# Patient Record
Sex: FEMALE | Race: BLACK OR AFRICAN AMERICAN | NOT HISPANIC OR LATINO | Employment: UNEMPLOYED | URBAN - METROPOLITAN AREA
[De-identification: names, ages, dates, MRNs, and addresses within clinical notes are randomized per-mention and may not be internally consistent; named-entity substitution may affect disease eponyms.]

---

## 2018-12-20 ENCOUNTER — OFFICE VISIT (OUTPATIENT)
Dept: PLASTIC SURGERY | Facility: CLINIC | Age: 43
End: 2018-12-20
Payer: COMMERCIAL

## 2018-12-20 VITALS
HEART RATE: 107 BPM | WEIGHT: 208.56 LBS | DIASTOLIC BLOOD PRESSURE: 104 MMHG | BODY MASS INDEX: 32.66 KG/M2 | SYSTOLIC BLOOD PRESSURE: 153 MMHG | TEMPERATURE: 99 F

## 2018-12-20 DIAGNOSIS — M54.9 CHRONIC NECK AND BACK PAIN: ICD-10-CM

## 2018-12-20 DIAGNOSIS — I10 HYPERTENSION, UNSPECIFIED TYPE: ICD-10-CM

## 2018-12-20 DIAGNOSIS — G89.29 CHRONIC NECK AND BACK PAIN: ICD-10-CM

## 2018-12-20 DIAGNOSIS — N62 MACROMASTIA: Primary | ICD-10-CM

## 2018-12-20 DIAGNOSIS — M54.2 CHRONIC NECK AND BACK PAIN: ICD-10-CM

## 2018-12-20 PROCEDURE — 99999 PR PBB SHADOW E&M-EST. PATIENT-LVL III: CPT | Mod: PBBFAC,,, | Performed by: SURGERY

## 2018-12-20 PROCEDURE — 99202 OFFICE O/P NEW SF 15 MIN: CPT | Mod: S$GLB,,, | Performed by: SURGERY

## 2018-12-20 RX ORDER — HYDROCHLOROTHIAZIDE 25 MG/1
25 TABLET ORAL DAILY
COMMUNITY
End: 2022-06-27

## 2018-12-20 RX ORDER — LISINOPRIL 20 MG/1
20 TABLET ORAL NIGHTLY
COMMUNITY
End: 2022-10-10 | Stop reason: SDUPTHER

## 2018-12-27 NOTE — PROGRESS NOTES
History & Physical  Plastic and Reconstructive Surgery  Breast Reduction Consult    SUBJECTIVE:   Chief complaint: large breasts    History of Present Illness:  Patient is a 43 y.o. female with PMH of HTN presents with symptomatic bilateral large pendulous breasts. States that she has back and neck pain for greater than 10 years. Takes ibuprofen. She also participates in physical therapy, massage therapy, exercise program to relieve back and neck pain for 10 years. She also wears supportive bras with wide straps without relief. Complaints of shoulder grooving from caren straps. Patient wears a 38 J. Denies rashes. Also has macerating rashes during summer time. Patient reports positive hx of headaches and trouble sleeping. She is active. Works as a nutritional educator.      Patient had hysterectomy in 2016. Denies reaction to anesthesia or history of bleeding disordering. Denies history of breast cancer, personal or in her family history.     Social:  -   - denies smoking  - social etho  - 3 children     No past medical history on file.  HTN on medications    Past Surgical History:   Procedure Laterality Date    HYSTERECTOMY-TOTAL LAPAROSCOPIC (TLH) N/A 4/12/2016    Performed by Xavier Parada MD at [facility] OR    TUBAL LIGATION      TUBAL LIGATION     As noted above    No family history on file.  HTN in mother and father  No family history of breast cancer    Social History     Socioeconomic History    Marital status:      Spouse name: None    Number of children: None    Years of education: None    Highest education level: None   Social Needs    Financial resource strain: None    Food insecurity - worry: None    Food insecurity - inability: None    Transportation needs - medical: None    Transportation needs - non-medical: None   Occupational History    None   Tobacco Use    Smoking status: Never Smoker   Substance and Sexual Activity    Alcohol use: Yes     Comment: weekends    Drug  use: No    Sexual activity: None   Other Topics Concern    None   Social History Narrative    None       Current Outpatient Medications   Medication Sig Dispense Refill    hydroCHLOROthiazide (HYDRODIURIL) 25 MG tablet Take 25 mg by mouth once daily.      lisinopril (PRINIVIL,ZESTRIL) 20 MG tablet Take 20 mg by mouth once daily.       No current facility-administered medications for this visit.    Only medications listed on intake form are listed above  Takes aspirin and ibuprofen for neck/back pain occasionally    Review of patient's allergies indicates:  No Known Allergies.      Review of Systems:    Review of Systems   HENT: Positive for neck pain.    Musculoskeletal: Positive for back pain.   Neurological: Positive for headaches. dizziness    Pertinent 12 point ROS negative except as listed above.  She denies history of fatigue, weight change, eye problems, ear problems, hoarseness/voice change, chest discomfort, palpitations, vein inflammation, swollen feet, breathing problems, chronic cough, indigestion, trouble swallowing, abdominal pain, blood in stool, urinary problems, prostate problems, sexual problems, joint problems, back pain, musculoskeletal pain, skin problems, dizziness, headaches, muscle weakness, trouble sleeping, abnormal bruising, abnormal thirst, abnormal sweating, depression, anxiety, or any prior psychiatry consultation.        OBJECTIVE:     BP (!) 153/104   Pulse 107   Temp 98.5 °F (36.9 °C)   Wt 94.6 kg (208 lb 8.9 oz)   LMP  (Exact Date)   BMI 32.66 kg/m²     Physical Exam:    Physical Exam   Constitutional: She is oriented to person, place, and time. She appears well-developed and well-nourished.   Neck: Normal range of motion. Neck supple. No tracheal deviation present.   Cardiovascular: Normal rate, regular rhythm and normal heart sounds.    Pulmonary/Chest: Effort normal and breath sounds normal. bilaterally enlarged breasts, evidence of previous rashes, shoulder grooving,  no palpable masses, nipple everted  Abdominal: Soft. Bowel sounds are normal.   Musculoskeletal: Normal range of motion.   Neurological: She is alert and oriented to person, place, and time.   Skin: Skin is warm.     Labs  Lab Results   Component Value Date    WBC 6.29 2016    HGB 12.4 2016    HCT 38.1 2016    MCV 98 2016     2016     CMP/ BMP  No results found for: NA, K, CL, CO2, BUN, CREATININE, CALCIUM, ANIONGAP, ESTGFRAFRICA, EGFRNONAA  No results found for: ALT, AST, GGT, ALKPHOS, BILITOT  No results found for: INR, PROTIME    Last MM18 at diagnostic imaging     ASSESSMENT/PLAN:     1.Symptomatic Bilateral Macromastia  2. Chronic neck pain  3. Chronic back pain  4. Chronic breast rashes  5. No history of smoking or diabetes  6.  BMI < 35  7.  Completed mammogram.  At 43 years old, need report before proceeding.      PLAN:  -Will need 900 gm reduction per side  -Need to obtain [facility] at [facility] or an interpretation from outside facility before proceeding. Will submit paper work for insurance approval once this is clarified   -Photos obtained, in   -Risk, benefits, and alternatives explained.  -Discussed the possibility of free nipple grafting if nipple viability is in question at the time of surgery  -Will need labs and clearance by primary care provider before proceeding with anesthesia.      The bilateral breast reductionprocedure was discussed in detail with the patient as were the risks and possible complications which include but are not limited to bleeding, scarring, infection, pain, numbness, asymmetry, deformity, large open wound, skin necrosis, wound dehiscence, permanent or temporary loss of sensation to the nipple(s), partial or total loss of the nipple(s), free nipple graft, death, brain damage, pulmonary embolus and need for further surgery.     -She should return for a pre-operative appointment once insurance approval is verified.      I  have reviewed the notes, assessments, and/or procedures performed by Sherie Singh, I concur with her/his documentation of José Miguel Last.      Plastic & Reconstructive Surgery  Microsurgery  [facility]  c/o Rajinder Aldrich M.D.  [facility]  [address]  [address] [place] [address]    Work [phone]  Toll free [phone]  If no answer [phone]

## 2019-01-22 ENCOUNTER — TELEPHONE (OUTPATIENT)
Dept: PLASTIC SURGERY | Facility: CLINIC | Age: 44
End: 2019-01-22

## 2019-01-22 DIAGNOSIS — G89.29 CHRONIC NECK AND BACK PAIN: ICD-10-CM

## 2019-01-22 DIAGNOSIS — M54.2 CHRONIC NECK AND BACK PAIN: ICD-10-CM

## 2019-01-22 DIAGNOSIS — M54.9 CHRONIC NECK AND BACK PAIN: ICD-10-CM

## 2019-01-22 DIAGNOSIS — N62 MACROMASTIA: Primary | ICD-10-CM

## 2019-01-22 NOTE — TELEPHONE ENCOUNTER
Spoke with pt regarding her getting scheduled for her mammaogram. I was able to get pt scheduled for 1/24/19 at 8:30 am. Pt verbalized understanding of date time and location of this appt.

## 2019-01-24 ENCOUNTER — HOSPITAL ENCOUNTER (OUTPATIENT)
Dept: RADIOLOGY | Facility: HOSPITAL | Age: 44
Discharge: HOME | End: 2019-01-24
Attending: SURGERY
Payer: COMMERCIAL

## 2019-01-24 VITALS — BODY MASS INDEX: 32.65 KG/M2 | WEIGHT: 208 LBS | HEIGHT: 67 IN

## 2019-01-24 DIAGNOSIS — N62 MACROMASTIA: ICD-10-CM

## 2019-01-24 PROCEDURE — 76642 US BREAST LEFT LIMITED: ICD-10-PCS | Mod: 26,LT,, | Performed by: RADIOLOGY

## 2019-01-24 PROCEDURE — 76642 ULTRASOUND BREAST LIMITED: CPT | Mod: 26,LT,, | Performed by: RADIOLOGY

## 2019-01-24 PROCEDURE — 77062 BREAST TOMOSYNTHESIS BI: CPT | Mod: 26,,, | Performed by: RADIOLOGY

## 2019-01-24 PROCEDURE — 77062 MAMMO DIGITAL DIAGNOSTIC BILAT WITH TOMOSYNTHESIS_CAD: ICD-10-PCS | Mod: 26,,, | Performed by: RADIOLOGY

## 2019-01-24 PROCEDURE — 77066 DX MAMMO INCL CAD BI: CPT | Mod: 26,,, | Performed by: RADIOLOGY

## 2019-01-24 PROCEDURE — 76642 ULTRASOUND BREAST LIMITED: CPT | Mod: TC,PO,LT

## 2019-01-24 PROCEDURE — 77066 MAMMO DIGITAL DIAGNOSTIC BILAT WITH TOMOSYNTHESIS_CAD: ICD-10-PCS | Mod: 26,,, | Performed by: RADIOLOGY

## 2019-01-24 PROCEDURE — 77062 BREAST TOMOSYNTHESIS BI: CPT | Mod: TC,PO

## 2019-01-25 ENCOUNTER — TELEPHONE (OUTPATIENT)
Dept: RADIOLOGY | Facility: HOSPITAL | Age: 44
End: 2019-01-25

## 2019-01-25 NOTE — TELEPHONE ENCOUNTER
----- Message from Rosalia Jameson sent at 1/25/2019  2:21 PM CST -----  Contact: Pt.Self   Needs Advice    Reason for call:  Pt. States she needs to know if comparing mammograms from last year done already and if so would she need to come back for additional testing         Communication Preference:  [phone] (call anytime)    Additional Information:    Thank You

## 2019-01-28 ENCOUNTER — TELEPHONE (OUTPATIENT)
Dept: PLASTIC SURGERY | Facility: CLINIC | Age: 44
End: 2019-01-28

## 2019-01-28 NOTE — TELEPHONE ENCOUNTER
Called pt to let them know of their scheduled pre op appt on 02/04/19 at 1:00pm. Pt verbalized understanding of time date and location of scheduled appt.

## 2019-02-01 ENCOUNTER — TELEPHONE (OUTPATIENT)
Dept: PLASTIC SURGERY | Facility: CLINIC | Age: 44
End: 2019-02-01

## 2019-02-01 NOTE — TELEPHONE ENCOUNTER
I spoke pt and reminded  them of their scheduled appt on 2/4/19 , at 1:00 PM.  PT verbalized understanding of time date and location of appt.

## 2019-02-04 ENCOUNTER — OFFICE VISIT (OUTPATIENT)
Dept: PLASTIC SURGERY | Facility: CLINIC | Age: 44
End: 2019-02-04
Payer: COMMERCIAL

## 2019-02-04 ENCOUNTER — ANESTHESIA EVENT (OUTPATIENT)
Dept: SURGERY | Facility: HOSPITAL | Age: 44
End: 2019-02-04
Payer: COMMERCIAL

## 2019-02-04 VITALS
BODY MASS INDEX: 32.11 KG/M2 | SYSTOLIC BLOOD PRESSURE: 146 MMHG | DIASTOLIC BLOOD PRESSURE: 98 MMHG | HEART RATE: 99 BPM | WEIGHT: 205 LBS

## 2019-02-04 DIAGNOSIS — Z01.818 PREOPERATIVE TESTING: Primary | ICD-10-CM

## 2019-02-04 DIAGNOSIS — N62 MACROMASTIA: Primary | ICD-10-CM

## 2019-02-04 PROCEDURE — 99999 PR PBB SHADOW E&M-EST. PATIENT-LVL II: ICD-10-PCS | Mod: PBBFAC,,, | Performed by: SURGERY

## 2019-02-04 PROCEDURE — 99203 OFFICE O/P NEW LOW 30 MIN: CPT | Mod: S$GLB,,, | Performed by: SURGERY

## 2019-02-04 PROCEDURE — 99203 PR OFFICE/OUTPT VISIT, NEW, LEVL III, 30-44 MIN: ICD-10-PCS | Mod: S$GLB,,, | Performed by: SURGERY

## 2019-02-04 PROCEDURE — 99999 PR PBB SHADOW E&M-EST. PATIENT-LVL II: CPT | Mod: PBBFAC,,, | Performed by: SURGERY

## 2019-02-04 RX ORDER — OXYCODONE HYDROCHLORIDE 5 MG/1
5 TABLET ORAL EVERY 8 HOURS PRN
Qty: 30 TABLET | Refills: 0 | Status: SHIPPED | OUTPATIENT
Start: 2019-02-04 | End: 2022-06-27

## 2019-02-04 RX ORDER — ONDANSETRON 4 MG/1
4 TABLET, ORALLY DISINTEGRATING ORAL EVERY 8 HOURS PRN
Qty: 5 TABLET | Refills: 0 | Status: SHIPPED | OUTPATIENT
Start: 2019-02-04 | End: 2022-06-27

## 2019-02-04 RX ORDER — POLYETHYLENE GLYCOL 3350 17 G/17G
17 POWDER, FOR SOLUTION ORAL DAILY
Qty: 30 PACKET | Refills: 0 | Status: SHIPPED | OUTPATIENT
Start: 2019-02-04 | End: 2022-06-27

## 2019-02-04 RX ORDER — DOCUSATE SODIUM 100 MG/1
CAPSULE, LIQUID FILLED ORAL
Qty: 60 CAPSULE | Refills: 0 | COMMUNITY
Start: 2019-02-04 | End: 2022-06-27

## 2019-02-04 RX ORDER — IBUPROFEN 200 MG
200 TABLET ORAL EVERY 6 HOURS PRN
Status: ON HOLD | COMMUNITY
End: 2019-02-15 | Stop reason: HOSPADM

## 2019-02-04 NOTE — ANESTHESIA PREPROCEDURE EVALUATION
Tasha Danielson, RN   Registered Nurse      Pre Admission Screening   Signed                             []Hide copied text    []Hover for details      Anesthesia Assessment: Preoperative EQUATION     Planned Procedure: Procedure(s) (LRB):  MAMMOPLASTY, REDUCTION, BILATERAL (Bilateral)  Requested Anesthesia Type:General  Surgeon: Rajinder Aldrich MD  Service: Plastics  Known or anticipated Date of Surgery:2/15/2019     Surgeon notes: reviewed     Electronic QUestionnaire Assessment completed via nurse interview with patient.      No AQ        Triage considerations:      The patient has no apparent active cardiac condition (No unstable coronary Syndrome such as severe unstable angina or recent [<1 month] myocardial infarction, decompensated CHF, severe valvular   disease or significant arrhythmia)     Previous anesthesia records:GETA and No problems 04/12/16 Placement Time: 0902 Method of Intubation: Direct laryngoscopy Inserted by: CRNA Airway Device: Endotracheal Tube Mask Ventilation: Easy - oral Intubated: Postinduction Blade: Knutson #2 Airway Device Size: 7.0 Style: Cuffed Cuff Inflation: Minimal occlusive pressure Placement Verified By: Auscultation;Capnometry Grade: Grade II Complicating Factors: Small mouth Findings Post-Intubation: Positive EtCO2;Bilateral breath sounds;Atraumatic/Condition of teeth unchanged Depth of Insertion (cm): 22 Secured at: Lips Complications: None Breath Sounds: Equal Bilateral Insertion attempts (enter comment if more than 2 attempts): 1   Airway/Jaw/Neck:  Airway Findings: Mouth Opening: Normal Pre-Existing Airway Tube(s): Oral Endotracheal tube  General Airway Assessment: Adult  Mallampati: II        Last PCP note: outside [facility]   Subspecialty notes: n/a     Other important co-morbidities: HTN, obesity     Tests already available:  No recent tests. 2016 CBC                            Instructions given. (See in Nurse's note)     Optimization:  Anesthesia Preop  Clinic Assessment  Indicated-not required for this procedure    Medical Opinion Indicated-surgeon requested pt be cleared by PCP                                        Plan:    Testing:  Hemoglobin, BMP and EKG                           Consultation:Patient's PCP for a statement of optimization per surgeon request-fax sent to Dr Munson at [facility]                           Patient  has previously scheduled Medical Appointment:none     Navigation: Tests Scheduled. Surgeon ordered CBC and CMP, will add EKG and appt is on 2/7                        Consults scheduled.                        Results will be tracked by Preop Clinic.                                   Electronically signed by Tasha Danielson RN at 2/4/2019  3:51 PM       Pre-admit on 2/15/2019            Detailed Report    2/11/19 Lab and EKG results noted,OS PCP clearance obtained and scanned to media.                                                                                                            02/04/2019  José Miguel Last is a 43 y.o., female.    Anesthesia Evaluation         Review of Systems  Anesthesia Hx:  No problems with previous Anesthesia History of prior surgery of interest to airway management or planning: Previous anesthesia: General 2016 hysterectomy with general anesthesia.  Procedure performed at an [facility]. Denies Family Hx of Anesthesia complications.   Denies Personal Hx of Anesthesia complications.   Hematology/Oncology:  Hematology Normal   Oncology Normal     EENT/Dental:EENT/Dental Normal   Cardiovascular:    Denies Angina.  Functional Capacity 4.5 METS  Hypertension , Well Controlled on Rx    Pulmonary:  Pulmonary Normal  Denies Shortness of breath.  Denies Recent URI.    Renal/:  Renal/ Normal     Hepatic/GI:  Hepatic/GI Normal    Musculoskeletal:  Musculoskeletal General/Symptoms: neck pain. Functional capacity is ambulatory without assistance.    Neurological:  Neurology Normal    Endocrine:  Metabolic  Disorders, Obesity / BMI > 30  Psych:  Psychiatric Normal           Physical Exam  General:  Well nourished    Airway/Jaw/Neck:  Airway Findings: Mouth Opening: Normal Pre-Existing Airway Tube(s): Oral Endotracheal tube  General Airway Assessment: Adult  Mallampati: II     Eyes/Ears/Nose:  EYES/EARS/NOSE FINDINGS: Normal   Dental:  Dental Findings: (Tongue ring ) In tact   Chest/Lungs:  Chest/Lungs Findings: Clear to auscultation, Normal Respiratory Rate     Heart/Vascular:  Heart Findings: Rate: Normal  Rhythm: Regular Rhythm        Mental Status:  Mental Status Findings: Normal        Anesthesia Plan  Type of Anesthesia, risks & benefits discussed:  Anesthesia Type:  general  Patient's Preference: General  Intra-op Monitoring Plan: standard ASA monitors  Intra-op Monitoring Plan Comments:   Post Op Pain Control Plan: per primary service following discharge from PACU, multimodal analgesia and peripheral nerve block  Post Op Pain Control Plan Comments: Per primary service  Induction:   IV  Beta Blocker:  Patient is not currently on a Beta-Blocker (No further documentation required).       Informed Consent: Patient understands risks and agrees with Anesthesia plan.  Questions answered. Anesthesia consent signed with patient.  ASA Score: 2     Day of Surgery Review of History & Physical:    H&P update referred to the surgeon.         Ready For Surgery From Anesthesia Perspective.

## 2019-02-04 NOTE — PRE ADMISSION SCREENING
Anesthesia Assessment: Preoperative EQUATION    Planned Procedure: Procedure(s) (LRB):  MAMMOPLASTY, REDUCTION, BILATERAL (Bilateral)  Requested Anesthesia Type:General  Surgeon: Rajinder Aldrich MD  Service: Plastics  Known or anticipated Date of Surgery:2/15/2019    Surgeon notes: reviewed    Electronic QUestionnaire Assessment completed via nurse interview with patient.      No AQ      Triage considerations:     The patient has no apparent active cardiac condition (No unstable coronary Syndrome such as severe unstable angina or recent [<1 month] myocardial infarction, decompensated CHF, severe valvular   disease or significant arrhythmia)    Previous anesthesia records:GETA and No problems 04/12/16 Placement Time: 0902 Method of Intubation: Direct laryngoscopy Inserted by: CRNA Airway Device: Endotracheal Tube Mask Ventilation: Easy - oral Intubated: Postinduction Blade: Knutson #2 Airway Device Size: 7.0 Style: Cuffed Cuff Inflation: Minimal occlusive pressure Placement Verified By: Auscultation;Capnometry Grade: Grade II Complicating Factors: Small mouth Findings Post-Intubation: Positive EtCO2;Bilateral breath sounds;Atraumatic/Condition of teeth unchanged Depth of Insertion (cm): 22 Secured at: Lips Complications: None Breath Sounds: Equal Bilateral Insertion attempts (enter comment if more than 2 attempts): 1   Airway/Jaw/Neck:  Airway Findings: Mouth Opening: Normal Pre-Existing Airway Tube(s): Oral Endotracheal tube  General Airway Assessment: Adult  Mallampati: II       Last PCP note: outside [facility]   Subspecialty notes: n/a    Other important co-morbidities: HTN, obesity     Tests already available:  No recent tests. 2016 CBC            Instructions given. (See in Nurse's note)    Optimization:  Anesthesia Preop Clinic Assessment  Indicated-not required for this procedure    Medical Opinion Indicated-surgeon requested pt be cleared by PCP          Plan:    Testing:  Hemoglobin, BMP and EKG      Consultation:Patient's PCP for a statement of optimization per surgeon request-fax sent to Dr Munson at [facility]     Patient  has previously scheduled Medical Appointment:none    Navigation: Tests Scheduled. Surgeon ordered CBC and CMP, will add EKG and appt is on 2/7             Consults scheduled.             Results will be tracked by Preop Clinic.

## 2019-02-04 NOTE — PROGRESS NOTES
Plastic Surgery    Here for preoperative appointment.    Says she has taken and tolerated percocet well in the past  She has not had labs in over 1 year    We discussed location of incisions, possibility of free nipple grafting, time off required from work.  I explained the need to place drains.  Discussed that this is an outpatient procedure.  All of her questions were answered.      Her primary care doctor is at [facility] [phone]    12/29/18  Mammogram personally reviewed    Result:   Mammo Digital Diagnostic Bilat w/ Jackson  US Breast Left Limited     History:  Patient is 43 y.o. and is seen for a diagnostic mammogram. Scheduled for bilateral breast reduction.      Films Compared:  No studies are available for comparison.      Findings:  This procedure was performed using tomosynthesis.  Computer-aided detection was utilized in the interpretation of this examination.  The breasts have scattered areas of fibroglandular density.      Left  Mammo Digital Diagnostic Bilat w/ Jackson  There is a focal asymmetry seen in the left breast at 9 o'clock.      US Breast Left Limited  There is a probable 3 mm oval complicated cyst versus intramammary lymph node seen in the left breast at 9 o'clock, 10 cm from the nipple. The cyst correlates with the mammogram finding.      Right  Mammo Digital Diagnostic Bilat w/ Jackson  There is no evidence of suspicious masses, calcifications, or other abnormal findings.     Impression:  Left  Cyst: Left breast 3 mm cyst at the 9 o'clock position. Assessment: 0 - Incomplete. Obtain Prior Study for Comparison is recommended.      Right  There is no mammographic or sonographic evidence of malignancy.     BI-RADS Category:   Overall: 0 - Incomplete: Need Prior Mammograms for Comparison     Recommendation:  Obtain prior study for comparison is recommended.      The patient's estimated lifetime risk of breast cancer (to age 85) based on Tyrer-Cuzick - 7 risk assessment model is: Tyrer-Cuzick:  "9.91 %. According to the American Cancer Society,  patients with a lifetime breast cancer risk of 20% or higher might benefit from supplemental screening tests.     These findings and recommendations were discussed with the patient at the time of the exam by Dr. Nancy Heller.     Vitals:    02/04/19 1304   BP: (!) 146/98   Pulse: 99   Weight: 93 kg (205 lb 0.4 oz)       Height:   Ht Readings from Last 1 Encounters:   01/24/19 5' 7" (1.702 m)       Weight:   Wt Readings from Last 1 Encounters:   02/04/19 93 kg (205 lb 0.4 oz)       Body mass index is 32.11 kg/m².    Gen: Physical examination reveals a well developed, well nourished female of good mood who is alert and is oriented to person, place, time.    HEENT: Head is normocephalic and atraumatic. Extraocular motion is intact. Mucosal membranes moist.    Pulm: Breathing is non-labored, normal respiratory effort    CV: Palpable peripheral pulses    Extremities: No cyanosis or edema    Musculoskeletal: Normal gate and stance    Skin: Normal turgor    GI: Abdomen Soft, Non-tender, Non-distended. Moderate lipodystrophy.    Assessment  1.  Symptomatic macromastia    Plan  Needs consent for breast reduction, Upcoming surgery 2/15  Rx's written for procedure  Drain teaching performed  Ordered CBC and CMP.  This office will contact PAT and her PCP regarding upcoming surgery    30 minutes of face to face time, of which greater than fifty percent of the total visit was  counseling/coordinating care.  10 minutes was spent doing chart review      Plastic & Reconstructive Surgery  Microsurgery  [facility]  c/o Rajinder Aldrich M.D.  [facility]  [address]  [address] [place] [address]    Work [phone]  Toll free [phone]  If no answer [phone]    "

## 2019-02-04 NOTE — PATIENT INSTRUCTIONS
FOLLOW-UP:  You will see us in the office 2-25-19 at 10:000 AM for follow up in [facility]     CONTACT NUMBERS:     [facility]  [address] [place] [id number] [phone]     Plastic & Reconstructive Surgery  Microsurgery  [facility]  c/o Rajinder Aldrich M.D.  [facility]  [address]  [address] [facility] [place] [address]  Work [phone]  Toll free [phone]     To schedule appointment:  For all life-threatening emergencies, please call 911  For all other concerns regarding your plastic surgery, you may call the office at: [phone], toll free [phone].       BATHING  No tub soaking, lotions of creams to surgical sites until cleared by your doctor.  Ok to shower post op day 1.  No scrubbing or soaking of incisions.  Pat wounds dry.  Do not remove the tapes over your surgical sites.  If the edges become , trim the loose ends.       WOUND CARE  Maintain prineo dressings over your breast incision.  If it becomes loosened, ok to trim the loosened edge, pat dry.  Ok to remove the dressing on post op day 1 and shower.  You may want to use a kerlix necklace or pouch to offload the bulbs while sitting in a shower.  Do not tub soak your incisions.  Wear the surgical bra for comfort, but you should not wear a bra with underwires. Record the drain outputs as instructed.Use your drain log to record the output from your drain, which you can use to keep track of each of your drains.  There are further instructions for drain care at the bottom of this page.  You may start scar massage at 2 weeks, once cleared by Dr. Aldrich, if you are healing appropriately.     SUTURES  Do not remove any sutures.  These will be removed in the office.     ACTIVITY  Encourage po intake  You may resume light activity (walking, usual activities around the home).  Avoid heavy lifting, running, swimming, strenuous activity for at least 3 weeks.    Don't lift  anything heavier than a milk jug for at least 2 weeks.  I would prefer that your drain be removed before then.    Avoid long-distance travel for at least 3 weeks.  If you have long car rides, hydrate yourself well.  You can wear compressive stockings to avoid the blood in your legs from sitting still.  Perform ankle circles while awake to prevent stasis in your legs.  Get out of bed as soon as you can and start walking around.         THINGS TO WATCH FOR:  If you notice new pain, redness, abnormal drainage, abnormal fluid collection, fever, or wound healing issues please notify your provider immediately.  If there are issues with your surgical sites, we would rather know about them early, so that an appropriate plan of action can be followed.  If notified in a timely manner, this kind of post op care should be coordinated by Dr. Aldrich rather than a surgeon or emergency person you are not familiar with.     If you are short of breath or have new leg pain and/or having difficulty breathing, please go to your nearest emergency room.       MEDICATIONS  nawaf pain medication as needed:  Tylenol (acetominophen) 650 mg every 6 hours is recommended for mild pain.  If you are taking a narcotic mixed with acetaminophen, wait at least 4 HOURS after taking other acetaminophen-containing preparations (ie. Tylenol)  DO NOT exceed 4 grams of Tylenol in a 24 h period  Continue your usual medications and vitamins      SCAR MANAGEMENT  Scars may take over 1 year to mature.  Some scars will remain pink, dark purple, and possibly raised for 6-9 months after surgery.  After one year, scars often become flatter, smoother, and may change color.  After removal of the tegaderm/tape, suture removal, or when glue was used, apply a thin layer of Aquaphor (available at any drugstore) or antibiotic ointment to the scars for another 2 weeks.     Begin silicone when scars smooth, generally starting about 6 weeks after surgery.  Please discuss this  with Dr. Aldrich before proceeding.  Medical grade silicone gel is available on companies' web sites or on amazon.com  Brands recommended:  - Scarfade ([email or web address])  - NeuGel ( )  - Kelocote ([email or web address])     Drainage Tube  Your doctor discharges you with a Jayy-Jacobsen drainage tube. These tubes are in place to help prevent fluid from collecting around your prosthesis.  It is important that fluid does not stay in the cavity, because it can cause healing difficulties or implant infection.  It helps drain and collect blood and body fluid after surgery. This can prevent swelling and reduces the risk for infection. The tube is held in place by a few stitches.      Drain Care  · Dont sleep on the same side as the tube.  · Secure the tube and bag inside your clothing with a safety pin. This helps keep the tube from being pulled out.  · Empty your drain at least three times a day.  Regularly strip the tubing from your drain stitch to the bulb to prevent clots from accumulating.  Empty it when you notice it is half full with fluid.  When it gets beyond half way full, the suction mechanism does not work as well and the fluid collections in your wounds.  Wash and dry your hands before emptying the drain.  How to use the JACKELINE bulb:  ? Lift the opening on the drain.  ? Drain the fluid into a measuring cup.  ? Record the amount of fluid each time you empty the drain. Include the date and time it was emptied. Share this information with your doctor on your next visit.  ? Squeeze the bulb with your hands until you hear air coming out of the bulb if your doctor has instructed you to do so (sometimes the bulb is used as a reservoir without suction). Check with your doctor about specific drain instructions.  ? Close the opening.  · Change the dressing around the tube every day.  ? Wash your hands.  ? Remove the old bandage.  ? Wash your hands again.  ? Wet a cotton swab and clean the skin around the incision and tube site. Use  normal saline solution (salt and water). Or, you can use warm, soapy water.  ? Put a new bandage on the incision and tube site. Make the bandage large enough to cover the whole incision area.  ? Tape the bandage in place.  · Keep the bandage and tube site dry when you shower. Ask your healthcare provider about the best way to do this.  ? Stripping the tube helps keep blood clots from blocking the tube.   ? Hold the tubing where it leaves the skin, with one hand. This keeps it from pulling on the skin.  ? Pinch the tubing with the thumb and first finger of your other hand.  ? Slowly and firmly pull your thumb and first finger down the tubing. You may find it helpful to hold an alcohol swab between your fingers and the tube to lubricate the tubing.  ? If the pulling hurts or feels like its coming out of the skin, stop. Begin again more gently.     Follow-up care  Make a follow-up appointment as directed by our staff.     When to seek medical care  Call your healthcare provider right away if you have any of the following:  · New or increased pain around the tube  · Redness, swelling, or warmth around the incision or tube  · Drainage that is foul-smelling  · Vomiting  · Fever of 100.4°F (38°C)  · Fluid leaking around the tube  · Incision seems not to be healing  · Stitches become loose  · Tube falls out or breaks  · Drainage that changes from light pink to dark red  · Blood clots in the drainage bulb  · A sudden increase or decrease in the amount of drainage (over 30 mL)      [facility] Drain Record #1      Date Emptied Time Emptied Amount in [place]                                                                                                                                                                                 T[place] Drain #2      Date Emptied Time Emptied Amount in [place]                                                                                                                                                                                                            Plastic & Reconstructive Surgery  Microsurgery  [facility]  c/o Rajinder Aldrich M.D.  [facility]  [address]  [address] [place] [address]    Work [phone]  Toll free [phone]  If no answer [phone]

## 2019-02-07 ENCOUNTER — HOSPITAL ENCOUNTER (OUTPATIENT)
Dept: CARDIOLOGY | Facility: CLINIC | Age: 44
Discharge: HOME | End: 2019-02-07
Attending: SURGERY
Payer: COMMERCIAL

## 2019-02-07 DIAGNOSIS — Z01.818 PREOPERATIVE TESTING: ICD-10-CM

## 2019-02-07 PROCEDURE — 93000 ELECTROCARDIOGRAM COMPLETE: CPT | Mod: S$GLB,,, | Performed by: INTERNAL MEDICINE

## 2019-02-07 PROCEDURE — 93000 EKG 12-LEAD: ICD-10-PCS | Mod: S$GLB,,, | Performed by: INTERNAL MEDICINE

## 2019-02-13 ENCOUNTER — TELEPHONE (OUTPATIENT)
Dept: PLASTIC SURGERY | Facility: CLINIC | Age: 44
End: 2019-02-13

## 2019-02-13 NOTE — TELEPHONE ENCOUNTER
Spoke with pt regarding her [place] paperwork. I let pt know that we received clearance from her PCP & that I would call her 2/14 the day before her scheduled surgery to let her know what time to arrive. Pt was satisfied & verbalized understanding

## 2019-02-14 ENCOUNTER — TELEPHONE (OUTPATIENT)
Dept: PLASTIC SURGERY | Facility: CLINIC | Age: 44
End: 2019-02-14

## 2019-02-14 NOTE — TELEPHONE ENCOUNTER
Spoke with patient regarding arrival time for sugery , pt advised to arrive [facility] at 7:30 AM. Pt verbalized understanding. Pre Op education reinforced.

## 2019-02-15 ENCOUNTER — ANESTHESIA (OUTPATIENT)
Dept: SURGERY | Facility: HOSPITAL | Age: 44
End: 2019-02-15
Payer: COMMERCIAL

## 2019-02-15 ENCOUNTER — HOSPITAL ENCOUNTER (OUTPATIENT)
Facility: HOSPITAL | Age: 44
Discharge: HOME | End: 2019-02-15
Attending: SURGERY | Admitting: SURGERY
Payer: COMMERCIAL

## 2019-02-15 DIAGNOSIS — N62 MACROMASTIA: ICD-10-CM

## 2019-02-15 DIAGNOSIS — N92.1 MENOMETRORRHAGIA: Primary | ICD-10-CM

## 2019-02-15 PROCEDURE — 19318 BREAST REDUCTION: CPT | Mod: AS,,, | Performed by: PHYSICIAN ASSISTANT

## 2019-02-15 PROCEDURE — D9220A PRA ANESTHESIA: Mod: ,,, | Performed by: ANESTHESIOLOGY

## 2019-02-15 PROCEDURE — D9220A PRA ANESTHESIA: ICD-10-PCS | Mod: ,,, | Performed by: ANESTHESIOLOGY

## 2019-02-15 PROCEDURE — 71000015 HC POSTOP RECOV 1ST HR: Performed by: SURGERY

## 2019-02-15 PROCEDURE — 36000707: Performed by: SURGERY

## 2019-02-15 PROCEDURE — 27100025 HC TUBING, SET FLUID WARMER: Performed by: NURSE ANESTHETIST, CERTIFIED REGISTERED

## 2019-02-15 PROCEDURE — 63600175 PHARM REV CODE 636 W HCPCS: Performed by: NURSE ANESTHETIST, CERTIFIED REGISTERED

## 2019-02-15 PROCEDURE — 25000003 PHARM REV CODE 250: Performed by: NURSE ANESTHETIST, CERTIFIED REGISTERED

## 2019-02-15 PROCEDURE — 88305 TISSUE EXAM BY PATHOLOGIST: CPT | Mod: 26,,, | Performed by: PATHOLOGY

## 2019-02-15 PROCEDURE — C1729 CATH, DRAINAGE: HCPCS | Performed by: SURGERY

## 2019-02-15 PROCEDURE — 37000008 HC ANESTHESIA 1ST 15 MINUTES: Performed by: SURGERY

## 2019-02-15 PROCEDURE — 19318 BREAST REDUCTION: CPT | Mod: 50,,, | Performed by: SURGERY

## 2019-02-15 PROCEDURE — 71000033 HC RECOVERY, INTIAL HOUR: Performed by: SURGERY

## 2019-02-15 PROCEDURE — 63600175 PHARM REV CODE 636 W HCPCS: Performed by: STUDENT IN AN ORGANIZED HEALTH CARE EDUCATION/TRAINING PROGRAM

## 2019-02-15 PROCEDURE — 94761 N-INVAS EAR/PLS OXIMETRY MLT: CPT

## 2019-02-15 PROCEDURE — 63600175 PHARM REV CODE 636 W HCPCS: Performed by: ANESTHESIOLOGY

## 2019-02-15 PROCEDURE — 88305 TISSUE EXAM BY PATHOLOGIST: CPT | Performed by: PATHOLOGY

## 2019-02-15 PROCEDURE — 88305 TISSUE SPECIMEN TO PATHOLOGY - SURGERY: ICD-10-PCS | Mod: 26,,, | Performed by: PATHOLOGY

## 2019-02-15 PROCEDURE — 36000706: Performed by: SURGERY

## 2019-02-15 PROCEDURE — 19318 PR REDUCTION OF LARGE BREAST: ICD-10-PCS | Mod: AS,,, | Performed by: PHYSICIAN ASSISTANT

## 2019-02-15 PROCEDURE — C9290 INJ, BUPIVACAINE LIPOSOME: HCPCS | Performed by: SURGERY

## 2019-02-15 PROCEDURE — 27201423 OPTIME MED/SURG SUP & DEVICES STERILE SUPPLY: Performed by: SURGERY

## 2019-02-15 PROCEDURE — 37000009 HC ANESTHESIA EA ADD 15 MINS: Performed by: SURGERY

## 2019-02-15 PROCEDURE — 25000003 PHARM REV CODE 250: Performed by: SURGERY

## 2019-02-15 PROCEDURE — 99900035 HC TECH TIME PER 15 MIN (STAT)

## 2019-02-15 PROCEDURE — 63600175 PHARM REV CODE 636 W HCPCS: Performed by: SURGERY

## 2019-02-15 PROCEDURE — 19318 PR REDUCTION OF LARGE BREAST: ICD-10-PCS | Mod: 50,,, | Performed by: SURGERY

## 2019-02-15 RX ORDER — HYDROMORPHONE HYDROCHLORIDE 1 MG/ML
0.2 INJECTION, SOLUTION INTRAMUSCULAR; INTRAVENOUS; SUBCUTANEOUS EVERY 5 MIN PRN
Status: DISCONTINUED | OUTPATIENT
Start: 2019-02-15 | End: 2019-02-15 | Stop reason: HOSPADM

## 2019-02-15 RX ORDER — LIDOCAINE HYDROCHLORIDE 10 MG/ML
1 INJECTION, SOLUTION EPIDURAL; INFILTRATION; INTRACAUDAL; PERINEURAL ONCE
Status: DISCONTINUED | OUTPATIENT
Start: 2019-02-15 | End: 2019-02-15 | Stop reason: HOSPADM

## 2019-02-15 RX ORDER — MUPIROCIN 20 MG/G
OINTMENT TOPICAL
Status: DISCONTINUED | OUTPATIENT
Start: 2019-02-15 | End: 2019-02-15 | Stop reason: HOSPADM

## 2019-02-15 RX ORDER — ONDANSETRON 2 MG/ML
4 INJECTION INTRAMUSCULAR; INTRAVENOUS EVERY 6 HOURS PRN
Status: DISCONTINUED | OUTPATIENT
Start: 2019-02-15 | End: 2019-02-15 | Stop reason: HOSPADM

## 2019-02-15 RX ORDER — PHENYLEPHRINE HYDROCHLORIDE 10 MG/ML
INJECTION INTRAVENOUS
Status: DISCONTINUED | OUTPATIENT
Start: 2019-02-15 | End: 2019-02-15

## 2019-02-15 RX ORDER — OXYCODONE HYDROCHLORIDE 5 MG/1
5 TABLET ORAL EVERY 4 HOURS PRN
Status: DISCONTINUED | OUTPATIENT
Start: 2019-02-15 | End: 2019-02-15 | Stop reason: HOSPADM

## 2019-02-15 RX ORDER — METOPROLOL TARTRATE 1 MG/ML
INJECTION, SOLUTION INTRAVENOUS
Status: DISCONTINUED | OUTPATIENT
Start: 2019-02-15 | End: 2019-02-15

## 2019-02-15 RX ORDER — KETAMINE HYDROCHLORIDE 10 MG/ML
INJECTION, SOLUTION INTRAMUSCULAR; INTRAVENOUS
Status: DISCONTINUED | OUTPATIENT
Start: 2019-02-15 | End: 2019-02-15

## 2019-02-15 RX ORDER — SODIUM CHLORIDE 0.9 % (FLUSH) 0.9 %
3 SYRINGE (ML) INJECTION
Status: DISCONTINUED | OUTPATIENT
Start: 2019-02-15 | End: 2019-02-15 | Stop reason: HOSPADM

## 2019-02-15 RX ORDER — DEXAMETHASONE SODIUM PHOSPHATE 4 MG/ML
INJECTION, SOLUTION INTRA-ARTICULAR; INTRALESIONAL; INTRAMUSCULAR; INTRAVENOUS; SOFT TISSUE
Status: DISCONTINUED | OUTPATIENT
Start: 2019-02-15 | End: 2019-02-15

## 2019-02-15 RX ORDER — FENTANYL CITRATE 50 UG/ML
25 INJECTION, SOLUTION INTRAMUSCULAR; INTRAVENOUS EVERY 5 MIN PRN
Status: COMPLETED | OUTPATIENT
Start: 2019-02-15 | End: 2019-02-15

## 2019-02-15 RX ORDER — DOCUSATE SODIUM 100 MG/1
100 CAPSULE, LIQUID FILLED ORAL EVERY 12 HOURS
Status: DISCONTINUED | OUTPATIENT
Start: 2019-02-15 | End: 2019-02-15 | Stop reason: HOSPADM

## 2019-02-15 RX ORDER — SODIUM CHLORIDE 0.9 % (FLUSH) 0.9 %
5 SYRINGE (ML) INJECTION
Status: DISCONTINUED | OUTPATIENT
Start: 2019-02-15 | End: 2019-02-15 | Stop reason: HOSPADM

## 2019-02-15 RX ORDER — GLYCOPYRROLATE 0.2 MG/ML
INJECTION INTRAMUSCULAR; INTRAVENOUS
Status: DISCONTINUED | OUTPATIENT
Start: 2019-02-15 | End: 2019-02-15

## 2019-02-15 RX ORDER — MIDAZOLAM HYDROCHLORIDE 1 MG/ML
0.5 INJECTION INTRAMUSCULAR; INTRAVENOUS
Status: DISCONTINUED | OUTPATIENT
Start: 2019-02-15 | End: 2019-02-15 | Stop reason: HOSPADM

## 2019-02-15 RX ORDER — NEOSTIGMINE METHYLSULFATE 1 MG/ML
INJECTION, SOLUTION INTRAVENOUS
Status: DISCONTINUED | OUTPATIENT
Start: 2019-02-15 | End: 2019-02-15

## 2019-02-15 RX ORDER — EPINEPHRINE CONVENIENCE KIT 1 MG/ML(1)
KIT INTRAMUSCULAR; SUBCUTANEOUS
Status: DISCONTINUED | OUTPATIENT
Start: 2019-02-15 | End: 2019-02-15 | Stop reason: HOSPADM

## 2019-02-15 RX ORDER — ROCURONIUM BROMIDE 10 MG/ML
INJECTION, SOLUTION INTRAVENOUS
Status: DISCONTINUED | OUTPATIENT
Start: 2019-02-15 | End: 2019-02-15

## 2019-02-15 RX ORDER — MEPERIDINE HYDROCHLORIDE 50 MG/ML
12.5 INJECTION INTRAMUSCULAR; INTRAVENOUS; SUBCUTANEOUS ONCE AS NEEDED
Status: DISCONTINUED | OUTPATIENT
Start: 2019-02-15 | End: 2019-02-15 | Stop reason: HOSPADM

## 2019-02-15 RX ORDER — PROPOFOL 10 MG/ML
VIAL (ML) INTRAVENOUS
Status: DISCONTINUED | OUTPATIENT
Start: 2019-02-15 | End: 2019-02-15

## 2019-02-15 RX ORDER — LIDOCAINE HCL/PF 100 MG/5ML
SYRINGE (ML) INTRAVENOUS
Status: DISCONTINUED | OUTPATIENT
Start: 2019-02-15 | End: 2019-02-15

## 2019-02-15 RX ORDER — MUPIROCIN 20 MG/G
1 OINTMENT TOPICAL 2 TIMES DAILY
Status: DISCONTINUED | OUTPATIENT
Start: 2019-02-15 | End: 2019-02-15 | Stop reason: HOSPADM

## 2019-02-15 RX ORDER — SODIUM CHLORIDE 9 MG/ML
INJECTION, SOLUTION INTRAVENOUS CONTINUOUS PRN
Status: DISCONTINUED | OUTPATIENT
Start: 2019-02-15 | End: 2019-02-15

## 2019-02-15 RX ADMIN — FENTANYL CITRATE 25 MCG: 50 INJECTION, SOLUTION INTRAMUSCULAR; INTRAVENOUS at 05:02

## 2019-02-15 RX ADMIN — SODIUM CHLORIDE, SODIUM GLUCONATE, SODIUM ACETATE, POTASSIUM CHLORIDE, MAGNESIUM CHLORIDE, SODIUM PHOSPHATE, DIBASIC, AND POTASSIUM PHOSPHATE: .53; .5; .37; .037; .03; .012; .00082 INJECTION, SOLUTION INTRAVENOUS at 11:02

## 2019-02-15 RX ADMIN — PROPOFOL 150 MG: 10 INJECTION, EMULSION INTRAVENOUS at 11:02

## 2019-02-15 RX ADMIN — FENTANYL CITRATE 50 MCG: 50 INJECTION, SOLUTION INTRAMUSCULAR; INTRAVENOUS at 04:02

## 2019-02-15 RX ADMIN — FENTANYL CITRATE 100 MCG: 50 INJECTION, SOLUTION INTRAMUSCULAR; INTRAVENOUS at 11:02

## 2019-02-15 RX ADMIN — MUPIROCIN: 20 OINTMENT TOPICAL at 10:02

## 2019-02-15 RX ADMIN — ROCURONIUM BROMIDE 40 MG: 10 INJECTION, SOLUTION INTRAVENOUS at 11:02

## 2019-02-15 RX ADMIN — MIDAZOLAM HYDROCHLORIDE 2 MG: 1 INJECTION, SOLUTION INTRAMUSCULAR; INTRAVENOUS at 11:02

## 2019-02-15 RX ADMIN — ROCURONIUM BROMIDE 20 MG: 10 INJECTION, SOLUTION INTRAVENOUS at 02:02

## 2019-02-15 RX ADMIN — FENTANYL CITRATE 50 MCG: 50 INJECTION, SOLUTION INTRAMUSCULAR; INTRAVENOUS at 12:02

## 2019-02-15 RX ADMIN — ROCURONIUM BROMIDE 10 MG: 10 INJECTION, SOLUTION INTRAVENOUS at 02:02

## 2019-02-15 RX ADMIN — FENTANYL CITRATE 50 MCG: 50 INJECTION, SOLUTION INTRAMUSCULAR; INTRAVENOUS at 05:02

## 2019-02-15 RX ADMIN — LIDOCAINE HYDROCHLORIDE 50 MG: 20 INJECTION, SOLUTION INTRAVENOUS at 11:02

## 2019-02-15 RX ADMIN — DEXTROSE 2 G: 50 INJECTION, SOLUTION INTRAVENOUS at 11:02

## 2019-02-15 RX ADMIN — HYDROMORPHONE HYDROCHLORIDE 0.2 MG: 1 INJECTION, SOLUTION INTRAMUSCULAR; INTRAVENOUS; SUBCUTANEOUS at 06:02

## 2019-02-15 RX ADMIN — FENTANYL CITRATE 50 MCG: 50 INJECTION, SOLUTION INTRAMUSCULAR; INTRAVENOUS at 11:02

## 2019-02-15 RX ADMIN — METOPROLOL TARTRATE 2 MG: 5 INJECTION, SOLUTION INTRAVENOUS at 01:02

## 2019-02-15 RX ADMIN — GLYCOPYRROLATE 0.4 MG: 0.2 INJECTION, SOLUTION INTRAMUSCULAR; INTRAVENOUS at 05:02

## 2019-02-15 RX ADMIN — SODIUM CHLORIDE: 0.9 INJECTION, SOLUTION INTRAVENOUS at 11:02

## 2019-02-15 RX ADMIN — OXYCODONE HYDROCHLORIDE 5 MG: 5 TABLET ORAL at 06:02

## 2019-02-15 RX ADMIN — PROPOFOL 50 MG: 10 INJECTION, EMULSION INTRAVENOUS at 11:02

## 2019-02-15 RX ADMIN — KETAMINE HYDROCHLORIDE 20 MG: 10 INJECTION, SOLUTION INTRAMUSCULAR; INTRAVENOUS at 12:02

## 2019-02-15 RX ADMIN — KETAMINE HYDROCHLORIDE 30 MG: 10 INJECTION, SOLUTION INTRAMUSCULAR; INTRAVENOUS at 11:02

## 2019-02-15 RX ADMIN — PHENYLEPHRINE HYDROCHLORIDE 100 MCG: 10 INJECTION INTRAVENOUS at 01:02

## 2019-02-15 RX ADMIN — NEOSTIGMINE METHYLSULFATE 3 MG: 1 INJECTION INTRAVENOUS at 05:02

## 2019-02-15 RX ADMIN — ROCURONIUM BROMIDE 10 MG: 10 INJECTION, SOLUTION INTRAVENOUS at 11:02

## 2019-02-15 RX ADMIN — SODIUM CHLORIDE, SODIUM GLUCONATE, SODIUM ACETATE, POTASSIUM CHLORIDE, MAGNESIUM CHLORIDE, SODIUM PHOSPHATE, DIBASIC, AND POTASSIUM PHOSPHATE: .53; .5; .37; .037; .03; .012; .00082 INJECTION, SOLUTION INTRAVENOUS at 05:02

## 2019-02-15 RX ADMIN — DEXAMETHASONE SODIUM PHOSPHATE 4 MG: 4 INJECTION, SOLUTION INTRAMUSCULAR; INTRAVENOUS at 04:02

## 2019-02-15 RX ADMIN — FENTANYL CITRATE 50 MCG: 50 INJECTION, SOLUTION INTRAMUSCULAR; INTRAVENOUS at 02:02

## 2019-02-15 RX ADMIN — DEXTROSE 2 G: 50 INJECTION, SOLUTION INTRAVENOUS at 03:02

## 2019-02-15 NOTE — DISCHARGE INSTRUCTIONS
FOLLOW-UP:  You will see us in the office 2-25-19 at 10:000 AM for follow up in [facility]     CONTACT NUMBERS:     [facility]  [address] [place] [id number] [phone]     Plastic & Reconstructive Surgery  Microsurgery  [facility]  c/o Rajinder Aldrich M.D.  [facility]  [address]  [address] [facility] [place] [address]  Work [phone]  Toll free [phone]     To schedule appointment:  For all life-threatening emergencies, please call 911  For all other concerns regarding your plastic surgery, you may call the office at: [phone], toll free [phone].       BATHING  No tub soaking, lotions of creams to surgical sites until cleared by your doctor.  Ok to shower post op day 1.  No scrubbing or soaking of incisions.  Pat wounds dry.  Do not remove the tapes over your surgical sites.  If the edges become , trim the loose ends.       WOUND CARE  Maintain prineo dressings over your breast incision.  If it becomes loosened, ok to trim the loosened edge, pat dry.  Ok to remove the dressing on post op day 1 and shower.  You may want to use a kerlix necklace or pouch to offload the bulbs while sitting in a shower.  Do not tub soak your incisions.  Wear the surgical bra for comfort, but you should not wear a bra with underwires. Record the drain outputs as instructed.Use your drain log to record the output from your drain, which you can use to keep track of each of your drains.  There are further instructions for drain care at the bottom of this page.  You may start scar massage at 2 weeks, once cleared by Dr. Aldrich, if you are healing appropriately.     SUTURES  Do not remove any sutures.  These will be removed in the office.     ACTIVITY  Encourage po intake  You may resume light activity (walking, usual activities around the home).  Avoid heavy lifting, running, swimming, strenuous activity for at least 3 weeks.    Don't lift  anything heavier than a milk jug for at least 2 weeks.  I would prefer that your drain be removed before then.    Avoid long-distance travel for at least 3 weeks.  If you have long car rides, hydrate yourself well.  You can wear compressive stockings to avoid the blood in your legs from sitting still.  Perform ankle circles while awake to prevent stasis in your legs.  Get out of bed as soon as you can and start walking around.         THINGS TO WATCH FOR:  If you notice new pain, redness, abnormal drainage, abnormal fluid collection, fever, or wound healing issues please notify your provider immediately.  If there are issues with your surgical sites, we would rather know about them early, so that an appropriate plan of action can be followed.  If notified in a timely manner, this kind of post op care should be coordinated by Dr. Aldrich rather than a surgeon or emergency person you are not familiar with.     If you are short of breath or have new leg pain and/or having difficulty breathing, please go to your nearest emergency room.       MEDICATIONS  nawaf pain medication as needed:  Tylenol (acetominophen) 650 mg every 6 hours is recommended for mild pain.  If you are taking a narcotic mixed with acetaminophen, wait at least 4 HOURS after taking other acetaminophen-containing preparations (ie. Tylenol)  DO NOT exceed 4 grams of Tylenol in a 24 h period  Continue your usual medications and vitamins      SCAR MANAGEMENT  Scars may take over 1 year to mature.  Some scars will remain pink, dark purple, and possibly raised for 6-9 months after surgery.  After one year, scars often become flatter, smoother, and may change color.  After removal of the tegaderm/tape, suture removal, or when glue was used, apply a thin layer of Aquaphor (available at any drugstore) or antibiotic ointment to the scars for another 2 weeks.     Begin silicone when scars smooth, generally starting about 6 weeks after surgery.  Please discuss this  with Dr. Aldrich before proceeding.  Medical grade silicone gel is available on companies' web sites or on amazon.com  Brands recommended:  - Scarfade ([email or web address])  - NeuGel ( )  - Kelocote ([email or web address])     Drainage Tube  Your doctor discharges you with a Jayy-Jacobsen drainage tube. These tubes are in place to help prevent fluid from collecting around your prosthesis.  It is important that fluid does not stay in the cavity, because it can cause healing difficulties or implant infection.  It helps drain and collect blood and body fluid after surgery. This can prevent swelling and reduces the risk for infection. The tube is held in place by a few stitches.      Drain Care  · Dont sleep on the same side as the tube.  · Secure the tube and bag inside your clothing with a safety pin. This helps keep the tube from being pulled out.  · Empty your drain at least three times a day.  Regularly strip the tubing from your drain stitch to the bulb to prevent clots from accumulating.  Empty it when you notice it is half full with fluid.  When it gets beyond half way full, the suction mechanism does not work as well and the fluid collections in your wounds.  Wash and dry your hands before emptying the drain.  How to use the JACKELINE bulb:  ? Lift the opening on the drain.  ? Drain the fluid into a measuring cup.  ? Record the amount of fluid each time you empty the drain. Include the date and time it was emptied. Share this information with your doctor on your next visit.  ? Squeeze the bulb with your hands until you hear air coming out of the bulb if your doctor has instructed you to do so (sometimes the bulb is used as a reservoir without suction). Check with your doctor about specific drain instructions.  ? Close the opening.  · Change the dressing around the tube every day.  ? Wash your hands.  ? Remove the old bandage.  ? Wash your hands again.  ? Wet a cotton swab and clean the skin around the incision and tube site. Use  normal saline solution (salt and water). Or, you can use warm, soapy water.  ? Put a new bandage on the incision and tube site. Make the bandage large enough to cover the whole incision area.  ? Tape the bandage in place.  · Keep the bandage and tube site dry when you shower. Ask your healthcare provider about the best way to do this.  ? Stripping the tube helps keep blood clots from blocking the tube.   ? Hold the tubing where it leaves the skin, with one hand. This keeps it from pulling on the skin.  ? Pinch the tubing with the thumb and first finger of your other hand.  ? Slowly and firmly pull your thumb and first finger down the tubing. You may find it helpful to hold an alcohol swab between your fingers and the tube to lubricate the tubing.  ? If the pulling hurts or feels like its coming out of the skin, stop. Begin again more gently.     Follow-up care  Make a follow-up appointment as directed by our staff.     When to seek medical care  Call your healthcare provider right away if you have any of the following:  · New or increased pain around the tube  · Redness, swelling, or warmth around the incision or tube  · Drainage that is foul-smelling  · Vomiting  · Fever of 100.4°F (38°C)  · Fluid leaking around the tube  · Incision seems not to be healing  · Stitches become loose  · Tube falls out or breaks  · Drainage that changes from light pink to dark red  · Blood clots in the drainage bulb  · A sudden increase or decrease in the amount of drainage (over 30 mL)      [place] Drain Record #1      Date Emptied Time Emptied Amount in [place]                                                                                                                                                                                 T[place] Drain #2      Date Emptied Time Emptied Amount in [place]                                                                                                                                                                                                            Plastic & Reconstructive Surgery  Microsurgery  [facility]  c/o Rajinder Aldrich M.D.  [facility]  [address]  [address] [place] [address]    Work [phone]  Toll free [phone]  If no answer [phone]

## 2019-02-15 NOTE — TRANSFER OF CARE
"Anesthesia Transfer of Care Note    Patient: José Miguel Last    Procedure(s) Performed: Procedure(s) (LRB):  MAMMOPLASTY, REDUCTION, BILATERAL (Bilateral)    Patient location: PACU    Anesthesia Type: general    Transport from OR: Transported from OR on 6-10 L/min O2 by face mask with adequate spontaneous ventilation    Post pain: adequate analgesia    Post assessment: no apparent anesthetic complications and tolerated procedure well    Post vital signs: stable    Level of consciousness: awake, alert and oriented    Nausea/Vomiting: no nausea/vomiting    Complications: none    Transfer of care protocol was followed      Last vitals:   Visit Vitals  /60 (BP Location: Right arm, Patient Position: Lying)   Pulse 93   Temp 36.7 °C (98.1 °F) (Temporal)   Resp 20   Ht 5' 7.5" (1.715 m)   Wt 92.5 kg (204 lb)   LMP 04/07/2016 (Exact Date)   SpO2 (!) 94%   Breastfeeding? No   BMI 31.48 kg/m²     "

## 2019-02-16 NOTE — PLAN OF CARE
Pt and family given dc instructions. raul drain drainage reviewed. Recording mls for each raul reviewed. Marquise demonstrated raul drainage and amount taken from raul. Pt with min pain, no n/v. Pt going home with family.

## 2019-02-18 ENCOUNTER — OFFICE VISIT (OUTPATIENT)
Dept: PLASTIC SURGERY | Facility: CLINIC | Age: 44
End: 2019-02-18
Payer: COMMERCIAL

## 2019-02-18 VITALS
SYSTOLIC BLOOD PRESSURE: 143 MMHG | WEIGHT: 209.31 LBS | HEIGHT: 67 IN | HEART RATE: 110 BPM | DIASTOLIC BLOOD PRESSURE: 96 MMHG | BODY MASS INDEX: 32.85 KG/M2

## 2019-02-18 VITALS
OXYGEN SATURATION: 97 % | BODY MASS INDEX: 30.92 KG/M2 | DIASTOLIC BLOOD PRESSURE: 71 MMHG | WEIGHT: 204 LBS | HEIGHT: 68 IN | RESPIRATION RATE: 21 BRPM | SYSTOLIC BLOOD PRESSURE: 117 MMHG | TEMPERATURE: 98 F | HEART RATE: 103 BPM

## 2019-02-18 DIAGNOSIS — N62 MACROMASTIA: Primary | ICD-10-CM

## 2019-02-18 PROCEDURE — 99024 PR POST-OP FOLLOW-UP VISIT: ICD-10-PCS | Mod: S$GLB,,, | Performed by: SURGERY

## 2019-02-18 PROCEDURE — 99024 POSTOP FOLLOW-UP VISIT: CPT | Mod: S$GLB,,, | Performed by: SURGERY

## 2019-02-18 PROCEDURE — 99999 PR PBB SHADOW E&M-EST. PATIENT-LVL III: CPT | Mod: PBBFAC,,, | Performed by: SURGERY

## 2019-02-18 PROCEDURE — 99999 PR PBB SHADOW E&M-EST. PATIENT-LVL III: ICD-10-PCS | Mod: PBBFAC,,, | Performed by: SURGERY

## 2019-02-18 RX ORDER — KETOROLAC TROMETHAMINE 10 MG/1
10 TABLET, FILM COATED ORAL EVERY 6 HOURS
Qty: 12 TABLET | Refills: 0 | Status: SHIPPED | OUTPATIENT
Start: 2019-02-18 | End: 2019-02-21

## 2019-02-18 NOTE — ANESTHESIA POSTPROCEDURE EVALUATION
"Anesthesia Post Evaluation    Patient: José Miguel Last    Procedure(s) Performed: Procedure(s) (LRB):  MAMMOPLASTY, REDUCTION, BILATERAL (Bilateral)    Final Anesthesia Type: general  Patient location during evaluation: PACU  Patient participation: Yes- Able to Participate  Level of consciousness: awake and alert  Post-procedure vital signs: reviewed and stable  Pain management: adequate  Airway patency: patent  PONV status at discharge: No PONV  Anesthetic complications: no      Cardiovascular status: blood pressure returned to baseline  Respiratory status: unassisted  Hydration status: euvolemic  Follow-up not needed.        Visit Vitals  /71   Pulse 103   Temp 36.6 °C (97.8 °F)   Resp (!) 21   Ht 5' 7.5" (1.715 m)   Wt 92.5 kg (204 lb)   LMP 04/07/2016 (Exact Date)   SpO2 97%   Breastfeeding? No   BMI 31.48 kg/m²       Pain/Dexter Score: No Data Recorded      "

## 2019-02-18 NOTE — OP NOTE
SUMMARY      Date of Procedure: 2/15/2018      Procedure: Procedure(s) (LRB):  MAMMOPLASTY, REDUCTION, BILATERAL (Bilateral)      Surgeon(s) and Role:     * Rajinder Aldrich MD - Primary     Assisting Surgeon:   Sherie DE DIOS  There was no qualified resident available to assist during this procedure.       Pre-Operative Diagnosis:   Symptomatic Macromastia [N62]  Back pain, unspecified back location, unspecified back pain laterality, unspecified chronicity [M54.9]  Excoriated rash [R21]  Chronic bilateral low back pain, with sciatica presence unspecified [M54.5, G89.29]  Neck pain [M54.2]     Post-Operative Diagnosis:   Same     Anesthesia: General     Description of the Findings of the Procedure:   1.  Wise Pattern, Inferior Pedicle Breast Reduction  2.  Bilateral pectoralis, intercostal blocks with exparelle.     Complications: No     Estimated Blood Loss (EBL): 100 mL     Procedure Start: 1215  Procedure Finish: 1700           Implants: * No implants in log *     Specimens:  Bilateral breast tissue, approximately:  1200 grams on the left  1000 grams on the right           Indications:  43 year old female with symptomatic macromastia presents for bilateral breast reduction.  She was approved for 900 gram reduction.  I explained to her at her preoperative appointment that she likely needed a greater volume for reduction.  I explained to her that if I removed less tissue than the approved amount then she may be accountable for any expenses incurred.  Written consent was obtained in the pre-operative holding area.  Risks, benefits, and alternatives were discussed. All of her questions were answered.  The anesthesia team was consulted for regional blocks.  The patient was marked in the pre-operative area with permanent marker.  7 cm wise pattern limbs were placed.  I free hand marked the wings of the wise pattern.  A 10 cm wide inferior pedicle was marked.  I pointed out to her that she carries her left  shoulder higher, and in my opinion this is why her right breast appears to be more ptotic.  I explained to her that in my opinion it will be optimal to perform a larger reduction on her left side.  I planned on using skeletal landmarks to determine nipple areola position rather than her posture.  I explained to her that no cup size is guaranteed.  She told me that she would be satisfied if I made her approximately 50% of her pre-operative size.    All of the patient's questions were answered.       Operative Sequence:     The patient was transported to the operating room and placed in a supine position where general anesthesia was induced.  An appropriate time out was performed the operative sight.  Appropriate antibiotics were dosed. The patient was prepped and draped in standard fashion. The markings were reinforced.  It was confirmed that her left breast was larger.     I began by scoring the wise pattern limbs. On the right side I did the following.  A 42 mm cookie cutter was used to incise around the areola.  I de-epithelialized the inferior pedicle with a 10 blade.  1:100,000 epinephrine soaked laps were placed over the inferior pedicle for hemostasis.  I left a rounded tab at the future site of T closure, which had isosceles limbs of 3 cm. Subcutaneous flaps were then raised medially, superiorly, and laterally.  Medially the skin was taken and the breast tissue along the medial pole of the breast was preserved.  I continued this resection in one piece, going down to chest wall, leaving the septum of the breast attached to the inferior pedicle and taking care not to undermine the pedicle.  Hemoclips were used for hemostasis of large vessels.  Laterally the breast tissue was removed from the chest wall.  The serratus fascia and pectoralis fascia were not violated during this dissection.  One specimen of breast parenchyma was sent.  The de-epithelialized trimmings were also sent.  Hemostasis was obtained with  bovie cautery.  1% peroxide was used to clean the dissection and bed and confirm that there was no bleeding.  A 2-0 silk stitch was used to approximate the wise limbs at the T junction.  Staples were used to approximate skin edges and remove dog ears.  The right breast specimen initially weighed approximately 900 grams.     A similar procedure was performed on the left side.  This side was significantly larger. A 42 mm cookie cutter was used to incise around the areola.  I de-epithelialized the inferior pedicle with a 10 blade.  1:100,000 epinephrine soaked laps were placed over the inferior pedicle for hemostasis.  I left a rounded tab at the future site of T closure, which had isosceles limbs of 3 cm.  Subcutaneous flaps were then raised medially, superiorly, and laterally.  Medially the skin was taken and the breast tissue along the medial pole of the breast was preserved.  Hemostasis of large vessels was obtained with surgical clips.  I continued this resection in one piece, going down to chest wall, leaving the septum of the breast attached to the inferior pedicle and taking care not to undermine the pedicle.  Laterally the breast tissue was removed from the chest wall.  The serratus fascia and pectoralis fascia were not violated during this dissection.  One specimen of breast parenchyma was sent.  The de-epithelialized trimmings were also sent.  Hemostasis was obtained with bovie cautery.  1% peroxide was used to clean the dissection and bed and confirm that there was no bleeding.  A 2-0 silk stitch was used to approximate the wise limbs at the T junction.  Staples were used to approximate skin edges and remove dog ears. The left breast specimen was sent to pathology and measured approximately 900 grams.    She was then reflexed.  Symmetry of the NACs were confirmed.  In my opinion, she was too large after this volume was removed, and I decided to remove additional tissue bilaterally.  I also determined that  she was larger on her left side after this resection.     I then re-opened the incisions, and I removed additional breast tissue breast tissue from each side, taking from the sides of the pedicle while not undermining it and excising additional tissue laterally. Hemostasis was confirmed.  Final resection volumes are listed above.    She was then reflexed.  A lap was used to maria elena out the ideal areola position based on midline to nipple position as well as the cranial border of the manubrium.  A 42 mm cookie cutter was used at the site of the new nipple position.  Palpation test also confirmed that there was symmetry of volume between sides.    I performed bilateral intercostal and pectoralis blocks on each side with exparelle, injecting 30 cc of the 60 cc solutions of exparelle (40 cc normal saline, 266 mg exparelle) on each side.   I performed an additional round of hemostasis with suture ligature, bovie, and surgiflo.       19 Faroese channel drains were placed around the pedicle on each side.  They were secured with 2-0 silk suture.  2-0 silk tailor tack sutures were then placed to remove dog ears laterally.  Excess skin was excised and sent with the breast specimens.  The patient was then returned to the recumbant position.  A 2-0 PDS suture was placed to approximate the T junction.  Interrupted 3-0 monocryl sutures were placed in the deep dermal layer with approximation of the skin.  To facilitate nipple areola inset, I had to accordion plicate the NAC on each side bernarda.  There was adequate perfusion of the nipple at the time of inset.  Interrupted 3-0 and 4-0 monocryl was placed to complete inset the nipple areola complexes on each side.  The subcuticular layer was closed with running 3-0 monocryl.  There was good perfusion of each nipple areola complex.  5-0 chromic sutures were placed to seal off and leaks in the superficial layers of the skin.  Bulbs were attached to the channel drains and placed to  suction.  There was no output in the OR.       Prineo was placed for dressing on the wise limbs and bacitracin was placed around the nipple areola complexes.  ABD pads were placed and laterally and anteriorly to protect the skin.  The bra fit well.       Her obrien was removed at the end of the case.       I was present for the all of the critical parts of the case.       At the time of closure all needle and sponge counts were correct.  She was transferred to the recovery area for further management.       Her post op check in the recovery area showed appropriate NAC appearance and drain output.  There was minimal output in the bulb and both bulbs were to bulb suction.      I updated her family at the end of the procedure.  Her decision was to be discharged home from the recovery area.                Condition: Stable     Disposition: PACU - hemodynamically stable.     Attestation: I was present for the entire procedure.      Plastic & Reconstructive Surgery  Microsurgery  [facility]  c/o Rajinder Aldrich M.D.  [facility]  [address]  [address] [place] [address]     Work [phone]  Toll free [phone]  If no answer [phone]

## 2019-02-20 NOTE — DISCHARGE SUMMARY
[facility]  Discharge Summary  Plastic Surgery      Admit Date: 2/15/2019    Discharge Date and Time: 2/15/19    Attending Physician: No att. providers found     Discharge Provider: Rajinder Aldrich    Reason for Admission: Was not admitted.  Was discharged from PACU    Procedures Performed: Procedure(s) (LRB):  MAMMOPLASTY, REDUCTION, BILATERAL (Bilateral)    Hospital Course (synopsis of major diagnoses, care, treatment, and services provided during the course of the hospital stay): Sent to recovery after breast reduction.  Discharged home.     Consults: none    Significant Diagnostic Studies: None    Final Diagnoses:    Principal Problem: <principal problem not specified>   Secondary Diagnoses:   Active Hospital Problems    Diagnosis  POA    Macromastia [N62]  Yes      Resolved Hospital Problems   No resolved problems to display.       Discharged Condition: good    Disposition: Home or Self Care    Follow Up/Patient Instructions:     Medications:  Reconciled Home Medications:      Medication List      CONTINUE taking these medications    docusate sodium 100 MG capsule  Commonly known as:  COLACE  Take 100 mg po bid while on narcotics, hold for loose stools     hydroCHLOROthiazide 25 MG tablet  Commonly known as:  HYDRODIURIL  Take 25 mg by mouth once daily.     lisinopril 20 MG tablet  Commonly known as:  PRINIVIL,ZESTRIL  Take 20 mg by mouth every evening.     ondansetron 4 MG Tbdl  Commonly known as:  ZOFRAN-ODT  Take 1 tablet (4 mg total) by mouth every 8 (eight) hours as needed (severe nausea, vomiting).     oxyCODONE 5 MG immediate release tablet  Commonly known as:  ROXICODONE  Take 1 tablet (5 mg total) by mouth every 8 (eight) hours as needed for Pain.     polyethylene glycol 17 gram Pwpk  Commonly known as:  GLYCOLAX  Take 17 g by mouth once daily. Take while on narcotics, hold for loose stools        STOP taking these medications    ibuprofen 200 MG tablet  Commonly known as:   GURMEET BRYANT          Discharge Procedure Orders   Diet clear liquid     Diet general     Teach JACKELINE drain care and provide sheet to record output     Wear Surgical Bra and/or Girdle at all times (may remove for short times to shower)   Order Comments: Wear Surgical Bra and/or Girdle at all times (may remove for short times to shower)     Discharge instructions (Specify)     Call MD for:  temperature >100.4     Call MD for:  persistent nausea and vomiting     Call MD for:  severe uncontrolled pain     Activity as tolerated

## 2019-02-22 ENCOUNTER — TELEPHONE (OUTPATIENT)
Dept: PLASTIC SURGERY | Facility: CLINIC | Age: 44
End: 2019-02-22

## 2019-02-22 NOTE — TELEPHONE ENCOUNTER
2/20 RB- 6CC LB-13CC//2/21 RB-0 LB-0// 2/22 RB-8CC LB 12CC were ms shrestha drain outputs ..I also reminded her of her scheduled appt on Monday 2/25/19 at 9:30am. Pt verbalized understanding of appt date time and location.

## 2019-02-24 NOTE — PROGRESS NOTES
Plastic Update  Date of Surgery: 2/15/19    Subjective:  Reports moderate to severe pain, despite oxycodone  Has been doing drain care  Says the drain tube stitches are irritating her  Denies nausea or vomiting.     Objective:  Nipple areola viable  No epidermolysis of nipple areola  Has in tact sensation on both sides to light touch  Incision line in tact  Drains stripped, output reviewed, now serosanguinous    Assessment:  Expected course after Bilateral Breast Reduction  Post operative pain    Plan:  Wrote for toradol po, 10 mg po q 6 hours for 3 days.    Wean narcotic pain control  Take bowel regimen.    Continue surgical bra with abd pads  Continue meticulous raul drain care  Will remove drains at next post-operative visit most likely  All of her questions were answered.    Plastic & Reconstructive Surgery  Microsurgery  [facility]  c/o Rajinder Aldrich M.D.  [facility]  [address]  [address] [place] [address]    Work [phone]  Toll free [phone]  If no answer [phone]

## 2019-02-25 ENCOUNTER — OFFICE VISIT (OUTPATIENT)
Dept: PLASTIC SURGERY | Facility: CLINIC | Age: 44
End: 2019-02-25
Payer: COMMERCIAL

## 2019-02-25 VITALS
BODY MASS INDEX: 32.03 KG/M2 | DIASTOLIC BLOOD PRESSURE: 85 MMHG | WEIGHT: 204.5 LBS | SYSTOLIC BLOOD PRESSURE: 135 MMHG | HEART RATE: 104 BPM

## 2019-02-25 DIAGNOSIS — N62 MACROMASTIA: Primary | ICD-10-CM

## 2019-02-25 PROCEDURE — 99024 PR POST-OP FOLLOW-UP VISIT: ICD-10-PCS | Mod: S$GLB,,, | Performed by: SURGERY

## 2019-02-25 PROCEDURE — 99999 PR PBB SHADOW E&M-EST. PATIENT-LVL III: CPT | Mod: PBBFAC,,, | Performed by: SURGERY

## 2019-02-25 PROCEDURE — 99999 PR PBB SHADOW E&M-EST. PATIENT-LVL III: ICD-10-PCS | Mod: PBBFAC,,, | Performed by: SURGERY

## 2019-02-25 PROCEDURE — 99024 POSTOP FOLLOW-UP VISIT: CPT | Mod: S$GLB,,, | Performed by: SURGERY

## 2019-02-25 NOTE — LETTER
February 25, 2019      José Miguel Last  [address] [facility] [place] [address]             [facility]  [address]  [facility] [address]  Phone: [phone]  Fax: [phone] Patient: José Miguel Last  MRN: [id number]  YOB: 1975  Date of Visit: 02/25/2019    To Whom It May Concern:    José Miguel Last was seen in the Plastic Surgery Clinic on 02/25/2019. She may return to work/school on 3/6 with light duty restrictions (no heavy lifting more than 5 lbs). If you have any questions or concerns, or if I can be of further assistance, please do not hesitate to contact my office.    Sincerely,          Rajinder Aldrich MD  Section of Plastic & Reconstructive Surgery  [facility]

## 2019-02-25 NOTE — PROGRESS NOTES
"Plastic Surgery  Date of Procedure: 2/15/19    Subjective:  Pain improved  Toradol helped her  Drain outputs less than 20 cc per day for last 3 days  She pointed out firmness in left side of her breast    Objective:  Good contour and symmetry  Viable Nipple Areola Complexes  Drain output serous  No evidence of fluid collection or infection  Breasts soft over the left side of her breast    FINAL PATHOLOGIC DIAGNOSIS    1. RIGHT BREAST TISSUE, REDUCTION MAMMOPLASTY:  - Benign breast tissue with fibroadenomatoid change and microcysts.  - Benign skin.  - Negative for atypia or malignancy.    2. LEFT BREAST TISSUE, REDUCTION MAMMOPLASTY:  - Benign breast tissue with fibroadenomatoid change and microcysts.  - Benign skin.  - Negative for atypia or malignancy.  Diagnosed by: Trinh Tuttle M.D.  (Electronically Signed: 2019-02-25 09:52:35)    Gross Description  Number of containers: Two    Part 1  Container Label: Clinic Number/AP Number: [id number], and "right breast tissue "  Received in formalin are multiple irregular fragments of dark brown skin and fibrofatty tissue, altogether measuring  26.5 x 18.6 x 8.5 cm and weighing 1077 gm. Serial sections show a predominantly fatty cut surface with very thin  intervening fibrous areas. No distinct lesions are identified. Representative sections submitted in [id number].    Part 2  Container Label: Clinic Number/AP Number: [id number], and "left breast tissue "  Received in formalin are multiple irregular fragments of dark brown skin and fibrofatty tissue, altogether measuring  30 x 19.2 x 10.1 cm and weighing 1289 gm. Serial sections show a predominantly fatty cut surface     Assessment:  Expected healing after bilateral breast reduction    Plan:  Gave letter for return to work with light duty  Wean narcotic pain medications  Drains removed today  Will consider ultrasound at next visit if she has persistent fullness of left side of breast  Can use her arms for " transfer  Continue no underwire bras for at least 2 months  Please call in the interim with any questions or concerns.      Plastic & Reconstructive Surgery  Microsurgery  [facility]  c/o Rajinder Aldrich M.D.  [facility]  [address]  [address] [place] [address]    Work [phone]  Toll free [phone]  If no answer [phone]

## 2019-03-11 ENCOUNTER — OFFICE VISIT (OUTPATIENT)
Dept: PLASTIC SURGERY | Facility: CLINIC | Age: 44
End: 2019-03-11
Payer: COMMERCIAL

## 2019-03-11 VITALS
BODY MASS INDEX: 31.58 KG/M2 | SYSTOLIC BLOOD PRESSURE: 140 MMHG | HEART RATE: 97 BPM | DIASTOLIC BLOOD PRESSURE: 95 MMHG | WEIGHT: 201.63 LBS | TEMPERATURE: 99 F

## 2019-03-11 DIAGNOSIS — N62 MACROMASTIA: Primary | ICD-10-CM

## 2019-03-11 PROCEDURE — 99999 PR PBB SHADOW E&M-EST. PATIENT-LVL II: CPT | Mod: PBBFAC,,, | Performed by: SURGERY

## 2019-03-11 PROCEDURE — 99024 POSTOP FOLLOW-UP VISIT: CPT | Mod: S$GLB,,, | Performed by: SURGERY

## 2019-03-11 PROCEDURE — 99999 PR PBB SHADOW E&M-EST. PATIENT-LVL II: ICD-10-PCS | Mod: PBBFAC,,, | Performed by: SURGERY

## 2019-03-11 PROCEDURE — 99024 PR POST-OP FOLLOW-UP VISIT: ICD-10-PCS | Mod: S$GLB,,, | Performed by: SURGERY

## 2019-03-11 NOTE — LETTER
March 11, 2019      José Miguel Last  [address] [facility] [place] [address]             [facility] - [facility]  [address]  [facility] [address]  Phone: [phone]  Fax: [phone] Patient: José Miguel Last  MRN: [id number]  YOB: 1975  Date of Visit: 03/11/2019    To Whom It May Concern:    José Miguel Last was seen in the Plastic Surgery Clinic on 03/11/2019. She may return to work/school on 3/18/19 with no restrictions. If you have any questions or concerns, or if I can be of further assistance, please do not hesitate to contact my office.    Sincerely,          Rajinder Aldrich MD  Section of Plastic & Reconstructive Surgery  [facility]

## 2019-03-11 NOTE — PROGRESS NOTES
"Plastic Surgery  Date of Procedure: 2/15/19     Subjective:  Pain acceptable  She described hypersensitivity in both nipple/areolae while wearing a bra.  Does not report burning.  Just discomfort over the last 2 weeks.  Says they are "extremely sensitive to light touch"  She has not been performing scar massage     Objective:  Good contour and symmetry  Viable Nipple Areola Complexes  Stable eschar around left areolae- discrete 1mm regions of scabbing around areolae  Firmness appreciated over her left breast, outer quadrant.  Non tender  She did not describe dysesthesia over her areolae with light tough testing     FINAL PATHOLOGIC DIAGNOSIS     1. RIGHT BREAST TISSUE, REDUCTION MAMMOPLASTY:  - Benign breast tissue with fibroadenomatoid change and microcysts.  - Benign skin.  - Negative for atypia or malignancy.     2. LEFT BREAST TISSUE, REDUCTION MAMMOPLASTY:  - Benign breast tissue with fibroadenomatoid change and microcysts.  - Benign skin.  - Negative for atypia or malignancy.  Diagnosed by: Trinh Tuttle M.D.  (Electronically Signed: 2019-02-25 09:52:35)     Gross Description  Number of containers: Two     Part 1  Container Label: Clinic Number/AP Number: [id number], and "right breast tissue "  Received in formalin are multiple irregular fragments of dark brown skin and fibrofatty tissue, altogether measuring  26.5 x 18.6 x 8.5 cm and weighing 1077 gm. Serial sections show a predominantly fatty cut surface with very thin  intervening fibrous areas. No distinct lesions are identified. Representative sections submitted in [id number].     Part 2  Container Label: Clinic Number/AP Number: [id number], and "left breast tissue "  Received in formalin are multiple irregular fragments of dark brown skin and fibrofatty tissue, altogether measuring  30 x 19.2 x 10.1 cm and weighing 1289 gm. Serial sections show a predominantly fatty cut surface      Assessment:  Expected healing after bilateral breast " reduction     Plan:  Gave letter for return to work 3/19  Wean narcotic pain medications  No indication for ultrasound  Instructed her on scar massage for desensitization around her areolae.  Wrote her for gabapentin.  We discussed side effects.  If she has issues, she can call to discuss stopping this medication.  Otherwise take as directed.    Provided her information regarding battery powered scar massager  Can use her arms for transfer  Continue no underwire bras for at least 2 months post op  Follow up in 2 months or sooner if problems arise  Please call in the interim with any questions or concerns.       Plastic & Reconstructive Surgery  Microsurgery  [facility]  c/o Rajinder Aldrich M.D.  [facility]  [address]  [address] [place] [address]     Work [phone]  Toll free [phone]  If no answer [phone]

## 2019-03-11 NOTE — LETTER
March 11, 2019      José Miguel Last  [address] [facility] [place] [address]             [facility] - [facility]  [address]  [facility] [address]  Phone: [phone]  Fax: [phone] Patient: José Miguel Last  MRN: [id number]  YOB: 1975  Date of Visit: 03/11/2019    To Whom It May Concern:    José Miguel Last was seen in the Plastic Surgery Clinic on 03/11/2019. She may return to work/school on 3/15/19 with no restrictions. If you have any questions or concerns, or if I can be of further assistance, please do not hesitate to contact my office.    Sincerely,          Rajinder Aldrich MD  Section of Plastic & Reconstructive Surgery  [facility]

## 2019-03-12 ENCOUNTER — TELEPHONE (OUTPATIENT)
Dept: PLASTIC SURGERY | Facility: CLINIC | Age: 44
End: 2019-03-12

## 2019-03-12 RX ORDER — GABAPENTIN 300 MG/1
300 CAPSULE ORAL 3 TIMES DAILY
Qty: 90 CAPSULE | Refills: 0 | Status: SHIPPED | OUTPATIENT
Start: 2019-03-12 | End: 2022-06-27

## 2019-03-12 NOTE — TELEPHONE ENCOUNTER
Pt called and stated she was at the pharmacy to get a Rx Dr Aldrich was supposed to send over for her nerves, Pt stated Rx wasnt there I told pt I would call Dr Aldrich, and let her know when I heard from him. Pt verbalized understanding,

## 2019-05-07 ENCOUNTER — OFFICE VISIT (OUTPATIENT)
Dept: PLASTIC SURGERY | Facility: CLINIC | Age: 44
End: 2019-05-07
Payer: COMMERCIAL

## 2019-05-07 VITALS — RESPIRATION RATE: 12 BRPM

## 2019-05-07 DIAGNOSIS — Z09 POSTOPERATIVE EXAMINATION: Primary | ICD-10-CM

## 2019-05-07 PROCEDURE — 99999 PR PBB SHADOW E&M-EST. PATIENT-LVL II: ICD-10-PCS | Mod: PBBFAC,,, | Performed by: PHYSICIAN ASSISTANT

## 2019-05-07 PROCEDURE — 99024 POSTOP FOLLOW-UP VISIT: CPT | Mod: S$GLB,,, | Performed by: PHYSICIAN ASSISTANT

## 2019-05-07 PROCEDURE — 99024 PR POST-OP FOLLOW-UP VISIT: ICD-10-PCS | Mod: S$GLB,,, | Performed by: PHYSICIAN ASSISTANT

## 2019-05-07 PROCEDURE — 99999 PR PBB SHADOW E&M-EST. PATIENT-LVL II: CPT | Mod: PBBFAC,,, | Performed by: PHYSICIAN ASSISTANT

## 2019-05-07 NOTE — PROGRESS NOTES
Plastic Update  Date of Surgery:   2/15/19     Subjective:  Denies drainage from incision in last few days  Pain is well controlled with OTC medications. Patient states there is a great improvement in back pain. Very pleased with outcome. Patient reports right breast near axilla has soften significantly with battery powered scar massager.  Patient is ambulating without difficulty  Denies fever  Denies calf pain or shortness of breath     Objective:  WD WN NAD  VSS  Normal resp effort  R breast - incision CDI, no erythema or drainage, nipple viable. Normal sensation to NAC.  L breast -  incision CDI, no erythema or drainage, nipple viable. Decreased sensation to NAC.   Good symmetry between sides. Breasts are soft bilaterally. Incision line in tact. No evidence of fluid collection or infection bilaterally     Assessment:  1.  2 months s/p bilateral breast reduction, expected healing.    2.  No evidence of fluid collection or abnormal bleeding     Plan:  - Continue OTC pain control  - Continue surgical bra.  No underwires recommended.  Can switch to sports bra.  Instructed her to use one with clasp or zipper in front.  - OK to shower.   - Ambulate often  - Continue non narcotic pain control  - Encourage iron rich foods (red meat, beets) and/or multivitamin with iron  - Follow up in 2 weeks

## 2019-05-30 ENCOUNTER — TELEPHONE (OUTPATIENT)
Dept: PLASTIC SURGERY | Facility: CLINIC | Age: 44
End: 2019-05-30

## 2019-05-30 NOTE — TELEPHONE ENCOUNTER
Left message for pt making her aware that we were keeping her appt time the same but changing the location. I left our office number for her to call us back when message was received.

## 2019-05-31 ENCOUNTER — TELEPHONE (OUTPATIENT)
Dept: PLASTIC SURGERY | Facility: CLINIC | Age: 44
End: 2019-05-31

## 2019-05-31 NOTE — TELEPHONE ENCOUNTER
Spoke with pt and she explained to me that she can only be seen on mondays and fridays . I rescheduled pt.

## 2019-06-10 ENCOUNTER — OFFICE VISIT (OUTPATIENT)
Dept: PLASTIC SURGERY | Facility: CLINIC | Age: 44
End: 2019-06-10
Payer: COMMERCIAL

## 2019-06-10 VITALS
HEART RATE: 93 BPM | SYSTOLIC BLOOD PRESSURE: 138 MMHG | DIASTOLIC BLOOD PRESSURE: 92 MMHG | HEIGHT: 67 IN | BODY MASS INDEX: 32.35 KG/M2 | WEIGHT: 206.13 LBS

## 2019-06-10 DIAGNOSIS — N62 MACROMASTIA: Primary | ICD-10-CM

## 2019-06-10 PROCEDURE — 99999 PR PBB SHADOW E&M-EST. PATIENT-LVL III: ICD-10-PCS | Mod: PBBFAC,,, | Performed by: SURGERY

## 2019-06-10 PROCEDURE — 99999 PR PBB SHADOW E&M-EST. PATIENT-LVL III: CPT | Mod: PBBFAC,,, | Performed by: SURGERY

## 2019-06-10 PROCEDURE — 99024 POSTOP FOLLOW-UP VISIT: CPT | Mod: S$GLB,,, | Performed by: SURGERY

## 2019-06-10 PROCEDURE — 99024 PR POST-OP FOLLOW-UP VISIT: ICD-10-PCS | Mod: S$GLB,,, | Performed by: SURGERY

## 2019-06-10 NOTE — LETTER
Nicki 10, 2019      [facility]  [address]  [facility] [address]  Phone: [phone]  Fax: [phone]       Patient: José Miguel Last   YOB: 1975  Date of Visit: 06/10/2019    To Whom It May Concern:    José Miguel Last  was at [facility] on 06/10/2019. She may return to work/school on 6/11/ with no restrictions. If you have any questions or concerns, or if I can be of further assistance, please do not hesitate to contact me.    Sincerely,    Sherie Singh PA-C

## 2019-06-16 NOTE — PROGRESS NOTES
"Plastic Surgery  Date of Procedure: 2/15/19     Subjective:  Is back at work, without issue  Nipple hypersensitivity has resolved  Reports itching and tenderness along lateral aspects of incisions  Reports that left breast firmness has resolved with scar massage device.       Objective:  Good contour and symmetry  Viable Nipple Areola Complexes  Bilateral breasts are soft, nontender  Firmness along lateral IMF extensions of wise incisions.    No dysesthesia over her areolae with light tough testing  Areola sensation in tact to light touch     FINAL PATHOLOGIC DIAGNOSIS     1. RIGHT BREAST TISSUE, REDUCTION MAMMOPLASTY:  - Benign breast tissue with fibroadenomatoid change and microcysts.  - Benign skin.  - Negative for atypia or malignancy.     2. LEFT BREAST TISSUE, REDUCTION MAMMOPLASTY:  - Benign breast tissue with fibroadenomatoid change and microcysts.  - Benign skin.  - Negative for atypia or malignancy.  Diagnosed by: Trinh Tuttle M.D.  (Electronically Signed: 2019-02-25 09:52:35)     Gross Description  Number of containers: Two     Part 1  Container Label: Clinic Number/AP Number: [id number], and "right breast tissue "  Received in formalin are multiple irregular fragments of dark brown skin and fibrofatty tissue, altogether measuring  26.5 x 18.6 x 8.5 cm and weighing 1077 gm. Serial sections show a predominantly fatty cut surface with very thin  intervening fibrous areas. No distinct lesions are identified. Representative sections submitted in [id number].     Part 2  Container Label: Clinic Number/AP Number: [id number], and "left breast tissue "  Received in formalin are multiple irregular fragments of dark brown skin and fibrofatty tissue, altogether measuring  30 x 19.2 x 10.1 cm and weighing 1289 gm. Serial sections show a predominantly fatty cut surface      Assessment:  Expected healing after bilateral breast reduction     Plan:  Continue scar massager along lateral IMF.    Can continue " gabapentin    Can use her arms for transfer  Can resume underwear bras prn comfort.  Would perform a trial before purchasing new bras to avoid discomfort from rubbing along tender parts of her incisions  Schedule 3 month post operative photos.    Please call in the interim with any questions or concerns.       Plastic & Reconstructive Surgery  Microsurgery  [facility]  c/o Rajinder Aldrich M.D.  [facility]  [address]  [address] [place] [address]     Work [phone]  Toll free [phone]  If no answer [phone]

## 2020-12-04 LAB — CRC RECOMMENDATION EXT: NORMAL

## 2021-03-12 ENCOUNTER — OFFICE VISIT (OUTPATIENT)
Dept: OBSTETRICS AND GYNECOLOGY | Facility: CLINIC | Age: 46
End: 2021-03-12
Payer: COMMERCIAL

## 2021-03-12 VITALS
DIASTOLIC BLOOD PRESSURE: 84 MMHG | WEIGHT: 237 LBS | HEIGHT: 67 IN | BODY MASS INDEX: 37.2 KG/M2 | SYSTOLIC BLOOD PRESSURE: 132 MMHG

## 2021-03-12 DIAGNOSIS — Z01.419 WELL WOMAN EXAM WITH ROUTINE GYNECOLOGICAL EXAM: Primary | ICD-10-CM

## 2021-03-12 DIAGNOSIS — Z11.3 SCREENING FOR STD (SEXUALLY TRANSMITTED DISEASE): ICD-10-CM

## 2021-03-12 DIAGNOSIS — N89.8 VAGINAL DISCHARGE: ICD-10-CM

## 2021-03-12 PROCEDURE — 87481 CANDIDA DNA AMP PROBE: CPT | Mod: 59 | Performed by: OBSTETRICS & GYNECOLOGY

## 2021-03-12 PROCEDURE — 99386 PREV VISIT NEW AGE 40-64: CPT | Mod: S$GLB,,, | Performed by: OBSTETRICS & GYNECOLOGY

## 2021-03-12 PROCEDURE — 99386 PR PREVENTIVE VISIT,NEW,40-64: ICD-10-PCS | Mod: S$GLB,,, | Performed by: OBSTETRICS & GYNECOLOGY

## 2021-03-12 PROCEDURE — 99999 PR PBB SHADOW E&M-EST. PATIENT-LVL III: CPT | Mod: PBBFAC,,, | Performed by: OBSTETRICS & GYNECOLOGY

## 2021-03-12 PROCEDURE — 99999 PR PBB SHADOW E&M-EST. PATIENT-LVL III: ICD-10-PCS | Mod: PBBFAC,,, | Performed by: OBSTETRICS & GYNECOLOGY

## 2021-03-12 PROCEDURE — 87661 TRICHOMONAS VAGINALIS AMPLIF: CPT | Performed by: OBSTETRICS & GYNECOLOGY

## 2021-03-12 RX ORDER — TRIAMTERENE/HYDROCHLOROTHIAZID 37.5-25 MG
1 TABLET ORAL EVERY MORNING
COMMUNITY
Start: 2021-02-23 | End: 2022-06-27

## 2021-03-12 RX ORDER — ERGOCALCIFEROL 1.25 MG/1
CAPSULE ORAL
COMMUNITY
End: 2023-02-07

## 2021-03-16 ENCOUNTER — TELEPHONE (OUTPATIENT)
Dept: OBSTETRICS AND GYNECOLOGY | Facility: CLINIC | Age: 46
End: 2021-03-16

## 2021-03-16 LAB
BACTERIAL VAGINOSIS DNA: POSITIVE
CANDIDA GLABRATA DNA: NEGATIVE
CANDIDA KRUSEI DNA: NEGATIVE
CANDIDA RRNA VAG QL PROBE: NEGATIVE
T VAGINALIS RRNA GENITAL QL PROBE: POSITIVE

## 2021-03-16 RX ORDER — METRONIDAZOLE 500 MG/1
TABLET ORAL
Qty: 4 TABLET | Refills: 1 | Status: SHIPPED | OUTPATIENT
Start: 2021-03-16 | End: 2022-06-27

## 2021-04-23 ENCOUNTER — OFFICE VISIT (OUTPATIENT)
Dept: OBSTETRICS AND GYNECOLOGY | Facility: CLINIC | Age: 46
End: 2021-04-23
Payer: COMMERCIAL

## 2021-04-23 VITALS
DIASTOLIC BLOOD PRESSURE: 90 MMHG | WEIGHT: 240.63 LBS | BODY MASS INDEX: 37.68 KG/M2 | SYSTOLIC BLOOD PRESSURE: 130 MMHG

## 2021-04-23 DIAGNOSIS — A64 STD (FEMALE): Primary | ICD-10-CM

## 2021-04-23 PROCEDURE — 99999 PR PBB SHADOW E&M-EST. PATIENT-LVL III: CPT | Mod: PBBFAC,,, | Performed by: OBSTETRICS & GYNECOLOGY

## 2021-04-23 PROCEDURE — 99213 OFFICE O/P EST LOW 20 MIN: CPT | Mod: S$GLB,,, | Performed by: OBSTETRICS & GYNECOLOGY

## 2021-04-23 PROCEDURE — 87661 TRICHOMONAS VAGINALIS AMPLIF: CPT | Mod: 59 | Performed by: OBSTETRICS & GYNECOLOGY

## 2021-04-23 PROCEDURE — 99999 PR PBB SHADOW E&M-EST. PATIENT-LVL III: ICD-10-PCS | Mod: PBBFAC,,, | Performed by: OBSTETRICS & GYNECOLOGY

## 2021-04-23 PROCEDURE — 99213 PR OFFICE/OUTPT VISIT, EST, LEVL III, 20-29 MIN: ICD-10-PCS | Mod: S$GLB,,, | Performed by: OBSTETRICS & GYNECOLOGY

## 2021-04-23 PROCEDURE — 87481 CANDIDA DNA AMP PROBE: CPT | Mod: 59 | Performed by: OBSTETRICS & GYNECOLOGY

## 2021-04-23 RX ORDER — PANTOPRAZOLE SODIUM 40 MG/1
40 TABLET, DELAYED RELEASE ORAL EVERY MORNING
COMMUNITY
Start: 2021-04-03 | End: 2022-06-27

## 2021-04-26 ENCOUNTER — TELEPHONE (OUTPATIENT)
Dept: OBSTETRICS AND GYNECOLOGY | Facility: CLINIC | Age: 46
End: 2021-04-26

## 2021-04-26 LAB
BACTERIAL VAGINOSIS DNA: POSITIVE
CANDIDA GLABRATA DNA: NEGATIVE
CANDIDA KRUSEI DNA: NEGATIVE
CANDIDA RRNA VAG QL PROBE: NEGATIVE
T VAGINALIS RRNA GENITAL QL PROBE: NEGATIVE

## 2021-04-26 RX ORDER — METRONIDAZOLE 500 MG/1
500 TABLET ORAL 2 TIMES DAILY
Qty: 10 TABLET | Refills: 1 | Status: SHIPPED | OUTPATIENT
Start: 2021-04-26 | End: 2021-05-01

## 2021-05-04 ENCOUNTER — PATIENT MESSAGE (OUTPATIENT)
Dept: RESEARCH | Facility: HOSPITAL | Age: 46
End: 2021-05-04

## 2021-05-10 ENCOUNTER — PATIENT MESSAGE (OUTPATIENT)
Dept: RESEARCH | Facility: HOSPITAL | Age: 46
End: 2021-05-10

## 2021-12-31 ENCOUNTER — LAB VISIT (OUTPATIENT)
Dept: PRIMARY CARE CLINIC | Facility: OTHER | Age: 46
End: 2021-12-31
Attending: INTERNAL MEDICINE
Payer: COMMERCIAL

## 2021-12-31 ENCOUNTER — PATIENT MESSAGE (OUTPATIENT)
Dept: ADMINISTRATIVE | Facility: OTHER | Age: 46
End: 2021-12-31
Payer: COMMERCIAL

## 2021-12-31 DIAGNOSIS — Z20.822 ENCOUNTER FOR LABORATORY TESTING FOR COVID-19 VIRUS: ICD-10-CM

## 2021-12-31 PROCEDURE — U0003 INFECTIOUS AGENT DETECTION BY NUCLEIC ACID (DNA OR RNA); SEVERE ACUTE RESPIRATORY SYNDROME CORONAVIRUS 2 (SARS-COV-2) (CORONAVIRUS DISEASE [COVID-19]), AMPLIFIED PROBE TECHNIQUE, MAKING USE OF HIGH THROUGHPUT TECHNOLOGIES AS DESCRIBED BY CMS-2020-01-R: HCPCS | Mod: ST72 | Performed by: INTERNAL MEDICINE

## 2022-01-03 ENCOUNTER — PATIENT MESSAGE (OUTPATIENT)
Dept: ADMINISTRATIVE | Facility: OTHER | Age: 47
End: 2022-01-03
Payer: COMMERCIAL

## 2022-01-04 LAB
SARS-COV-2 RNA RESP QL NAA+PROBE: DETECTED
SARS-COV-2- CYCLE NUMBER: 23

## 2022-05-25 ENCOUNTER — TELEPHONE (OUTPATIENT)
Dept: OBSTETRICS AND GYNECOLOGY | Facility: CLINIC | Age: 47
End: 2022-05-25
Payer: COMMERCIAL

## 2022-05-25 NOTE — TELEPHONE ENCOUNTER
----- Message from Janey Langford sent at 5/25/2022  9:00 AM CDT -----  Regarding: call back  Contact: [phone]  Who Called: PT     Patient is calling to talk to nurse in regards to how often does she need a pap smear after having a hysterectomy. Please advice

## 2022-05-30 ENCOUNTER — TELEPHONE (OUTPATIENT)
Dept: INTERNAL MEDICINE | Facility: CLINIC | Age: 47
End: 2022-05-30
Payer: COMMERCIAL

## 2022-05-30 ENCOUNTER — OFFICE VISIT (OUTPATIENT)
Dept: OBSTETRICS AND GYNECOLOGY | Facility: CLINIC | Age: 47
End: 2022-05-30
Payer: COMMERCIAL

## 2022-05-30 VITALS
DIASTOLIC BLOOD PRESSURE: 80 MMHG | BODY MASS INDEX: 37.21 KG/M2 | WEIGHT: 237.63 LBS | SYSTOLIC BLOOD PRESSURE: 125 MMHG

## 2022-05-30 DIAGNOSIS — Z01.419 WELL WOMAN EXAM WITH ROUTINE GYNECOLOGICAL EXAM: Primary | ICD-10-CM

## 2022-05-30 DIAGNOSIS — Z11.3 SCREENING FOR STD (SEXUALLY TRANSMITTED DISEASE): ICD-10-CM

## 2022-05-30 DIAGNOSIS — Z12.31 BREAST CANCER SCREENING BY MAMMOGRAM: ICD-10-CM

## 2022-05-30 PROCEDURE — 87491 CHLMYD TRACH DNA AMP PROBE: CPT | Performed by: OBSTETRICS & GYNECOLOGY

## 2022-05-30 PROCEDURE — 99999 PR PBB SHADOW E&M-EST. PATIENT-LVL III: CPT | Mod: PBBFAC,,, | Performed by: OBSTETRICS & GYNECOLOGY

## 2022-05-30 PROCEDURE — 99999 PR PBB SHADOW E&M-EST. PATIENT-LVL III: ICD-10-PCS | Mod: PBBFAC,,, | Performed by: OBSTETRICS & GYNECOLOGY

## 2022-05-30 PROCEDURE — 99396 PREV VISIT EST AGE 40-64: CPT | Mod: S$GLB,,, | Performed by: OBSTETRICS & GYNECOLOGY

## 2022-05-30 PROCEDURE — 87481 CANDIDA DNA AMP PROBE: CPT | Mod: 59 | Performed by: OBSTETRICS & GYNECOLOGY

## 2022-05-30 PROCEDURE — 87591 N.GONORRHOEAE DNA AMP PROB: CPT | Performed by: OBSTETRICS & GYNECOLOGY

## 2022-05-30 PROCEDURE — 99396 PR PREVENTIVE VISIT,EST,40-64: ICD-10-PCS | Mod: S$GLB,,, | Performed by: OBSTETRICS & GYNECOLOGY

## 2022-05-30 RX ORDER — FUROSEMIDE 20 MG/1
20 TABLET ORAL DAILY
COMMUNITY
Start: 2022-05-08 | End: 2022-06-27

## 2022-05-30 RX ORDER — INDOMETHACIN 25 MG/1
25 CAPSULE ORAL 2 TIMES DAILY
COMMUNITY
Start: 2022-05-24 | End: 2023-03-03

## 2022-05-30 RX ORDER — ALLOPURINOL 300 MG/1
300 TABLET ORAL DAILY
COMMUNITY
Start: 2022-05-08 | End: 2022-10-10 | Stop reason: SDUPTHER

## 2022-05-30 RX ORDER — COLCHICINE 0.6 MG/1
TABLET ORAL
COMMUNITY
Start: 2022-04-04 | End: 2022-10-10 | Stop reason: SDUPTHER

## 2022-05-30 NOTE — PROGRESS NOTES
CC: Annual check-up    SUBJECTIVE:   46 y.o. female   for annual routine Pap and checkup. Patient's last menstrual period was 2016 (exact date)..  She has no unusual complaints.        Past Medical History:   Diagnosis Date    Hypertension      Past Surgical History:   Procedure Laterality Date    HYSTERECTOMY      REDUCTION OF BOTH BREASTS Bilateral 2/15/2019    Procedure: MAMMOPLASTY, REDUCTION, BILATERAL;  Surgeon: Rjainder Aldrich MD;  Location: [facility] OR [address] [place] [address];  Service: Plastics;  Laterality: Bilateral;    TOTAL REDUCTION MAMMOPLASTY  2019    TUBAL LIGATION      TUBAL LIGATION       Social History     Socioeconomic History    Marital status: Single   Tobacco Use    Smoking status: Never Smoker    Smokeless tobacco: Never Used   Substance and Sexual Activity    Alcohol use: Yes     Comment: socially    Drug use: No     Family History   Problem Relation Age of Onset    Hypertension Father     Stroke Father     Hypertension Mother      OB History    Para Term  AB Living   2 2 2     2   SAB IAB Ectopic Multiple Live Births                  # Outcome Date GA Lbr Krishan/2nd Weight Sex Delivery Anes PTL Lv   2 Term      Vag-Spont      1 Term      Vag-Spont            Current Outpatient Medications   Medication Sig Dispense Refill    lisinopril (PRINIVIL,ZESTRIL) 20 MG tablet Take 20 mg by mouth every evening.       pantoprazole (PROTONIX) 40 MG tablet Take 40 mg by mouth every morning.      allopurinoL (ZYLOPRIM) 300 MG tablet Take 300 mg by mouth once daily.      colchicine (COLCRYS) 0.6 mg tablet Take by mouth.      docusate sodium (COLACE) 100 MG capsule Take 100 mg po bid while on narcotics, hold for loose stools (Patient not taking: Reported on 3/12/2021) 60 capsule 0    ergocalciferol (ERGOCALCIFEROL) 50,000 unit Cap ergocalciferol (vitamin D2) 1,250 mcg (50,000 unit) capsule   TK 1 C PO 2 TIMES A WK      furosemide (LASIX) 20 MG tablet Take 20 mg by mouth  once daily.      gabapentin (NEURONTIN) 300 MG capsule Take 1 capsule (300 mg total) by mouth 3 (three) times daily. 90 capsule 0    hydroCHLOROthiazide (HYDRODIURIL) 25 MG tablet Take 25 mg by mouth once daily.      indomethacin (INDOCIN) 25 MG capsule Take 25 mg by mouth 2 (two) times daily.      metroNIDAZOLE (FLAGYL) 500 MG tablet 4 TABLETS IN A SINGLE DOSE.  MAY REFILL ONCE (Patient not taking: Reported on 4/23/2021) 4 tablet 1    ondansetron (ZOFRAN-ODT) 4 MG TbDL Take 1 tablet (4 mg total) by mouth every 8 (eight) hours as needed (severe nausea, vomiting). (Patient not taking: Reported on 3/12/2021) 5 tablet 0    oxyCODONE (ROXICODONE) 5 MG immediate release tablet Take 1 tablet (5 mg total) by mouth every 8 (eight) hours as needed for Pain. 30 tablet 0    polyethylene glycol (GLYCOLAX) 17 gram PwPk Take 17 g by mouth once daily. Take while on narcotics, hold for loose stools (Patient not taking: Reported on 3/12/2021) 30 packet 0    triamterene-hydrochlorothiazide 37.5-25 mg (MAXZIDE-25) 37.5-25 mg per tablet Take 1 tablet by mouth every morning.       No current facility-administered medications for this visit.     Allergies: Patient has no known allergies.     ROS:  Constitutional: no weight loss, weight gain, fever, fatigue  Eyes:  No vision changes, glasses/contacts  ENT/Mouth: No ulcers, sinus problems, ears ringing, headache  Cardiovascular: No inability to lie flat, chest pain, exercise intolerance, swelling, heart palpitations  Respiratory: No wheezing, coughing blood, shortness of breath, or cough  Gastrointestinal: No diarrhea, bloody stool, nausea/vomiting, constipation, gas, hemorrhoids  Genitourinary: No blood in urine, painful urination, urgency of urination, frequency of urination, incomplete emptying, incontinence, abnormal bleeding, painful periods, heavy periods, vaginal discharge, vaginal odor, painful intercourse, sexual problems, bleeding after intercourse.  Musculoskeletal: No  muscle weakness  Skin/Breast: No painful breasts, nipple discharge, masses, rash, ulcers  Neurological: No passing out, seizures, numbness, headache  Endocrine: No diabetes, hypothyroid, hyperthyroid, hot flashes, hair loss, abnormal hair growth, ance  Psychiatric: No depression, crying  Hematologic: No bruises, bleeding, swollen lymph nodes, anemia.      OBJECTIVE:   The patient appears well, alert, oriented x 3, in no distress.  /80 (BP Location: Left arm, Patient Position: Sitting)   Wt 107.8 kg (237 lb 9.6 oz)   LMP 04/07/2016 (Exact Date)   BMI 37.21 kg/m²   NECK: no thyromegaly, trachea midline  SKIN: no acne, striae, hirsutism  BREAST EXAM: breasts appear normal, no suspicious masses, no skin or nipple changes or axillary nodes  ABDOMEN: no hernias, masses, or hepatosplenomegaly  GENITALIA: normal external genitalia, no erythema, no discharge  URETHRA: normal urethra, normal urethral meatus  VAGINA: Normal  CERVIX: no lesions or cervical motion tenderness and absent  UTERUS: uterus absent  ADNEXA: normal adnexa      ASSESSMENT:   well woman  1. Well woman exam with routine gynecological exam    2. Screening for STD (sexually transmitted disease)    3. Breast cancer screening by mammogram        PLAN:   mammogram  additional lab tests per orders  return annually or prn  Orders Placed This Encounter    Vaginosis Screen by DNA Probe    C. trachomatis/N. gonorrhoeae by AMP DNA [facility]; Cervix    Mammo Digital Screening Bilat w/ [facility]

## 2022-05-30 NOTE — TELEPHONE ENCOUNTER
Called to reschedule the patient due to the provider not being in clinic on 06/07. Pt stated that she will reschedule at a later date. Chart noted.

## 2022-05-31 LAB
BACTERIAL VAGINOSIS DNA: NEGATIVE
CANDIDA GLABRATA DNA: NEGATIVE
CANDIDA KRUSEI DNA: NEGATIVE
CANDIDA RRNA VAG QL PROBE: NEGATIVE
T VAGINALIS RRNA GENITAL QL PROBE: NEGATIVE

## 2022-06-01 LAB
C TRACH DNA SPEC QL NAA+PROBE: NOT DETECTED
N GONORRHOEA DNA SPEC QL NAA+PROBE: NOT DETECTED

## 2022-06-27 ENCOUNTER — PATIENT OUTREACH (OUTPATIENT)
Dept: ADMINISTRATIVE | Facility: HOSPITAL | Age: 47
End: 2022-06-27
Payer: COMMERCIAL

## 2022-06-27 ENCOUNTER — LAB VISIT (OUTPATIENT)
Dept: LAB | Facility: HOSPITAL | Age: 47
End: 2022-06-27
Attending: INTERNAL MEDICINE
Payer: COMMERCIAL

## 2022-06-27 ENCOUNTER — OFFICE VISIT (OUTPATIENT)
Dept: INTERNAL MEDICINE | Facility: CLINIC | Age: 47
End: 2022-06-27
Payer: COMMERCIAL

## 2022-06-27 VITALS
OXYGEN SATURATION: 95 % | HEART RATE: 99 BPM | HEIGHT: 67 IN | WEIGHT: 234.81 LBS | DIASTOLIC BLOOD PRESSURE: 88 MMHG | BODY MASS INDEX: 36.85 KG/M2 | SYSTOLIC BLOOD PRESSURE: 130 MMHG

## 2022-06-27 DIAGNOSIS — R79.89 ELEVATED LFTS: ICD-10-CM

## 2022-06-27 DIAGNOSIS — Z11.59 ENCOUNTER FOR HEPATITIS C SCREENING TEST FOR LOW RISK PATIENT: ICD-10-CM

## 2022-06-27 DIAGNOSIS — M79.89 LEG SWELLING: ICD-10-CM

## 2022-06-27 DIAGNOSIS — M10.9 GOUT, UNSPECIFIED CAUSE, UNSPECIFIED CHRONICITY, UNSPECIFIED SITE: ICD-10-CM

## 2022-06-27 DIAGNOSIS — Z00.00 PREVENTATIVE HEALTH CARE: ICD-10-CM

## 2022-06-27 DIAGNOSIS — E55.9 VITAMIN D DEFICIENCY: ICD-10-CM

## 2022-06-27 DIAGNOSIS — Z12.31 ENCOUNTER FOR SCREENING MAMMOGRAM FOR BREAST CANCER: ICD-10-CM

## 2022-06-27 DIAGNOSIS — Z00.00 PREVENTATIVE HEALTH CARE: Primary | ICD-10-CM

## 2022-06-27 DIAGNOSIS — D64.9 NORMOCYTIC ANEMIA: ICD-10-CM

## 2022-06-27 DIAGNOSIS — K21.9 GASTROESOPHAGEAL REFLUX DISEASE, UNSPECIFIED WHETHER ESOPHAGITIS PRESENT: ICD-10-CM

## 2022-06-27 DIAGNOSIS — Z13.1 SCREENING FOR DIABETES MELLITUS: ICD-10-CM

## 2022-06-27 DIAGNOSIS — E66.01 CLASS 2 SEVERE OBESITY DUE TO EXCESS CALORIES WITH SERIOUS COMORBIDITY AND BODY MASS INDEX (BMI) OF 36.0 TO 36.9 IN ADULT: ICD-10-CM

## 2022-06-27 DIAGNOSIS — R79.89 HIGH SERUM PARATHYROID HORMONE (PTH): ICD-10-CM

## 2022-06-27 DIAGNOSIS — I10 ESSENTIAL HYPERTENSION: ICD-10-CM

## 2022-06-27 PROBLEM — E66.812 CLASS 2 SEVERE OBESITY DUE TO EXCESS CALORIES WITH SERIOUS COMORBIDITY AND BODY MASS INDEX (BMI) OF 36.0 TO 36.9 IN ADULT: Status: ACTIVE | Noted: 2022-06-27

## 2022-06-27 LAB
ALBUMIN SERPL BCP-MCNC: 3.9 G/DL (ref 3.5–5.2)
ALP SERPL-CCNC: 64 U/L (ref 55–135)
ALT SERPL W/O P-5'-P-CCNC: 34 U/L (ref 10–44)
ANION GAP SERPL CALC-SCNC: 10 MMOL/L (ref 8–16)
AST SERPL-CCNC: 32 U/L (ref 10–40)
BASOPHILS # BLD AUTO: 0.03 K/UL (ref 0–0.2)
BASOPHILS NFR BLD: 0.5 % (ref 0–1.9)
BILIRUB DIRECT SERPL-MCNC: 0.2 MG/DL (ref 0.1–0.3)
BILIRUB SERPL-MCNC: 0.4 MG/DL (ref 0.1–1)
BNP SERPL-MCNC: 11 PG/ML (ref 0–99)
BUN SERPL-MCNC: 7 MG/DL (ref 6–20)
CALCIUM SERPL-MCNC: 9.8 MG/DL (ref 8.7–10.5)
CHLORIDE SERPL-SCNC: 103 MMOL/L (ref 95–110)
CHOLEST SERPL-MCNC: 212 MG/DL (ref 120–199)
CHOLEST/HDLC SERPL: 3.4 {RATIO} (ref 2–5)
CO2 SERPL-SCNC: 22 MMOL/L (ref 23–29)
CREAT SERPL-MCNC: 1 MG/DL (ref 0.5–1.4)
DIFFERENTIAL METHOD: ABNORMAL
EOSINOPHIL # BLD AUTO: 0.1 K/UL (ref 0–0.5)
EOSINOPHIL NFR BLD: 1.4 % (ref 0–8)
ERYTHROCYTE [DISTWIDTH] IN BLOOD BY AUTOMATED COUNT: 13.4 % (ref 11.5–14.5)
EST. GFR  (AFRICAN AMERICAN): >60 ML/MIN/1.73 M^2
EST. GFR  (NON AFRICAN AMERICAN): >60 ML/MIN/1.73 M^2
ESTIMATED AVG GLUCOSE: 117 MG/DL (ref 68–131)
GLUCOSE SERPL-MCNC: 103 MG/DL (ref 70–110)
HBA1C MFR BLD: 5.7 % (ref 4–5.6)
HCT VFR BLD AUTO: 35.1 % (ref 37–48.5)
HDLC SERPL-MCNC: 62 MG/DL (ref 40–75)
HDLC SERPL: 29.2 % (ref 20–50)
HGB BLD-MCNC: 11.3 G/DL (ref 12–16)
IMM GRANULOCYTES # BLD AUTO: 0.01 K/UL (ref 0–0.04)
IMM GRANULOCYTES NFR BLD AUTO: 0.2 % (ref 0–0.5)
LDLC SERPL CALC-MCNC: 113.4 MG/DL (ref 63–159)
LYMPHOCYTES # BLD AUTO: 2.4 K/UL (ref 1–4.8)
LYMPHOCYTES NFR BLD: 38.6 % (ref 18–48)
MCH RBC QN AUTO: 29.7 PG (ref 27–31)
MCHC RBC AUTO-ENTMCNC: 32.2 G/DL (ref 32–36)
MCV RBC AUTO: 92 FL (ref 82–98)
MONOCYTES # BLD AUTO: 0.3 K/UL (ref 0.3–1)
MONOCYTES NFR BLD: 5.2 % (ref 4–15)
NEUTROPHILS # BLD AUTO: 3.4 K/UL (ref 1.8–7.7)
NEUTROPHILS NFR BLD: 54.1 % (ref 38–73)
NONHDLC SERPL-MCNC: 150 MG/DL
NRBC BLD-RTO: 0 /100 WBC
PLATELET # BLD AUTO: 313 K/UL (ref 150–450)
PMV BLD AUTO: 11.3 FL (ref 9.2–12.9)
POTASSIUM SERPL-SCNC: 4.3 MMOL/L (ref 3.5–5.1)
PROT SERPL-MCNC: 7.9 G/DL (ref 6–8.4)
RBC # BLD AUTO: 3.81 M/UL (ref 4–5.4)
SODIUM SERPL-SCNC: 135 MMOL/L (ref 136–145)
TRIGL SERPL-MCNC: 183 MG/DL (ref 30–150)
TSH SERPL DL<=0.005 MIU/L-ACNC: 0.96 UIU/ML (ref 0.4–4)
URATE SERPL-MCNC: 3.6 MG/DL (ref 2.4–5.7)
WBC # BLD AUTO: 6.3 K/UL (ref 3.9–12.7)

## 2022-06-27 PROCEDURE — 36415 COLL VENOUS BLD VENIPUNCTURE: CPT | Mod: PO | Performed by: INTERNAL MEDICINE

## 2022-06-27 PROCEDURE — 84550 ASSAY OF BLOOD/URIC ACID: CPT | Performed by: INTERNAL MEDICINE

## 2022-06-27 PROCEDURE — 83880 ASSAY OF NATRIURETIC PEPTIDE: CPT | Performed by: INTERNAL MEDICINE

## 2022-06-27 PROCEDURE — 86803 HEPATITIS C AB TEST: CPT | Performed by: INTERNAL MEDICINE

## 2022-06-27 PROCEDURE — 85025 COMPLETE CBC W/AUTO DIFF WBC: CPT | Performed by: INTERNAL MEDICINE

## 2022-06-27 PROCEDURE — 83036 HEMOGLOBIN GLYCOSYLATED A1C: CPT | Performed by: INTERNAL MEDICINE

## 2022-06-27 PROCEDURE — 80048 BASIC METABOLIC PNL TOTAL CA: CPT | Performed by: INTERNAL MEDICINE

## 2022-06-27 PROCEDURE — 99386 PREV VISIT NEW AGE 40-64: CPT | Mod: 25,S$GLB,, | Performed by: INTERNAL MEDICINE

## 2022-06-27 PROCEDURE — 80061 LIPID PANEL: CPT | Performed by: INTERNAL MEDICINE

## 2022-06-27 PROCEDURE — 99999 PR PBB SHADOW E&M-EST. PATIENT-LVL IV: ICD-10-PCS | Mod: PBBFAC,,, | Performed by: INTERNAL MEDICINE

## 2022-06-27 PROCEDURE — 99999 PR PBB SHADOW E&M-EST. PATIENT-LVL IV: CPT | Mod: PBBFAC,,, | Performed by: INTERNAL MEDICINE

## 2022-06-27 PROCEDURE — 99386 PR PREVENTIVE VISIT,NEW,40-64: ICD-10-PCS | Mod: 25,S$GLB,, | Performed by: INTERNAL MEDICINE

## 2022-06-27 PROCEDURE — 80076 HEPATIC FUNCTION PANEL: CPT | Performed by: INTERNAL MEDICINE

## 2022-06-27 PROCEDURE — 84443 ASSAY THYROID STIM HORMONE: CPT | Performed by: INTERNAL MEDICINE

## 2022-06-27 RX ORDER — PANTOPRAZOLE SODIUM 40 MG/1
40 TABLET, DELAYED RELEASE ORAL EVERY MORNING
Qty: 90 TABLET | Refills: 1 | Status: SHIPPED | OUTPATIENT
Start: 2022-06-27 | End: 2023-02-23 | Stop reason: SDUPTHER

## 2022-06-27 RX ORDER — FUROSEMIDE 20 MG/1
20 TABLET ORAL DAILY
Qty: 90 TABLET | Refills: 1
Start: 2022-06-27 | End: 2022-10-10 | Stop reason: SDUPTHER

## 2022-06-27 NOTE — PATIENT INSTRUCTIONS
We were happy to see you today    For your Testing  Labs tdoay  Please have your labs and/or imaging test done at your earliest convience      For your Medication     For more information about side effects please visit medlineplus.gov      For your Referrals  Please get the records for [facility]         Please return to clinic in      6 months       Extra resources

## 2022-06-27 NOTE — PROGRESS NOTES
Assessment:       1. Preventative health care    2. Elevated LFTs    3. Normocytic anemia    4. Gout, unspecified cause, unspecified chronicity, unspecified site    5. Vitamin D deficiency    6. Essential hypertension    7. Leg swelling    8. Class 2 severe obesity due to excess calories with serious comorbidity and body mass index (BMI) of 36.0 to 36.9 in adult    9. Gastroesophageal reflux disease, unspecified whether esophagitis present    10. High serum parathyroid hormone (PTH)    11. Encounter for hepatitis C screening test for low risk patient    12. Encounter for screening mammogram for breast cancer    13. Screening for diabetes mellitus              Plan:             José Miguel was seen today for establish care.    Diagnoses and all orders for this visit:    Preventative health care  -     Lipid Panel; Future  -     Cancel: Fecal Immunochemical Test (iFOBT); Future  -     Cancel: (In Office Administered) Tdap Vaccine    Elevated LFTs  Recheck labs  -     Basic Metabolic Panel; Future  -     CBC Auto Differential; Future  -     Hepatic Function Panel; Future  -     TSH; Future    Normocytic anemia  Recheck labs    Gout, unspecified cause, unspecified chronicity, unspecified site  Recheck labs  Off HCTZ  -     Uric Acid; Future    Vitamin D deficiency    Essential hypertension  Chronic  Controlled  Patient is at goal today   I have reviewed lifestyle modification to achieve/maintain goals  We will continue the current medication regimen as listed below  Patient will follow up in 6 months   -     Basic Metabolic Panel; Standing    Leg swelling    New   Uncontrolled  Signs, symptoms consistent with CVI  history or pyhsical exam do not suggest vte  DDx includes but not limited to hypothyroidsim, renal or liver  dysfuction  I provided instruction on supportive care measures   Prior tesing reviewed and new testing was was given  no change    reviewed signs and symptoms that should prompt return to provider or  evaluation in the ED  -     Urinalysis; Future  -     BNP; Future  -     furosemide (LASIX) 20 MG tablet; Take 1 tablet (20 mg total) by mouth once daily.    Class 2 severe obesity due to excess calories with serious comorbidity and body mass index (BMI) of 36.0 to 36.9 in adult    Gastroesophageal reflux disease, unspecified whether esophagitis present  -     pantoprazole (PROTONIX) 40 MG tablet; Take 1 tablet (40 mg total) by mouth every morning.    High serum parathyroid hormone (PTH)  Comments:  seeing endocrinology     Encounter for hepatitis C screening test for low risk patient  -     Hepatitis C Antibody; Future    Encounter for screening mammogram for breast cancer  -     Mammo Digital Screening Bilat; Future    Screening for diabetes mellitus  -     Hemoglobin A1C; Future              Subjective:       Patient ID: José Miguel Meng is a 46 y.o. female.    Chief Complaint:   Establish Care      HPI    #Last visit/Previous Provider  This patient is new to me  Previously seen by Primary Doctor No  Last visit was  Last CPE was       Link to History           #Interim Hx    No urgent care or ED/hospitalization    #Concerns Today      Edema     Onset 6 months   Location:  bilateral   Duration;   Worsening;' standing   Alleviating; hctz   Asscoiate  Denies  Pertinet Hx/Meds       #Chronic Problems      HTN  Complication: no CVA/CKD/CAD  Last labs :   - BMP  Lab Results   Component Value Date     02/07/2019    K 3.7 02/07/2019     02/07/2019    CO2 23 02/07/2019    BUN 10 02/07/2019    CREATININE 0.9 02/07/2019    CALCIUM 9.5 02/07/2019    ANIONGAP 13 02/07/2019    ESTGFRAFRICA >60.0 02/07/2019    EGFRNONAA >60.0 02/07/2019       Medication: adherent to   - lisionpril  - hctz-triamterene  Home Bps;     Symptoms: denies CP, SOB, changes in urination, sudden weakness, numbness      Health Maintenance Due   Topic Date Due    Hepatitis C Screening  Never done    COVID-19 Vaccine (1) Never done     "TETANUS VACCINE  Never done    Colorectal Cancer Screening  Never done    Lipid Panel  03/13/2022    Mammogram  03/19/2022       Health Maintenance Topics with due status: Not Due       Topic Last Completion Date    Influenza Vaccine Not Due         Tobacco Use: Low Risk     Smoking Tobacco Use: Never Smoker    Smokeless Tobacco Use: Never Used         Results for orders placed during the hospital encounter of 03/19/21    Mammo Digital Screening Bilat w/ [facility]    Narrative  Result:  Mammo Digital Screening Bilat w/ [facility]    History:  Patient is 45 y.o. and is seen for a screening mammogram.    Films Compared:  Compared to: 01/24/2019 Mammo Digital Diagnostic Bilat w/ [facility]    Findings:  This procedure was performed using tomosynthesis. Computer-aided detection was utilized in the interpretation of this examination.  The breasts have scattered areas of fibroglandular density.    Bilateral  There are post-surgical findings from a previous breast reduction seen in both breasts. There has been no interval development of a suspicious mass, microcalcification, or architectural distortion.    Impression  Bilateral  There is no mammographic evidence of malignancy.    BI-RADS Category:  Overall: 2 - Benign    Recommendation:  Routine screening mammogram in 1 year is recommended.      Your estimated lifetime risk of breast cancer (to age 85) based on Tyrer-Cuzick risk assessment model is Tyrer-Cuzick: 8.01 %. According to the American Cancer Society, patients with a lifetime breast cancer risk of 20% or higher might benefit from supplemental screening tests.        Review of Systems      Objective:   /88 (BP Location: Right arm, Patient Position: Sitting, BP Method: Large (Manual))   Pulse 99   Ht 5' 7" (1.702 m)   Wt 106.5 kg (234 lb 12.6 oz)   LMP 04/07/2016 (Exact Date)   SpO2 95%   BMI 36.77 kg/m²     Vitals 6/27/2022 5/30/2022 4/23/2021 3/19/2021 3/12/2021   Height 67 - - 67 67   Weight (lbs) 234.79 237.6 " 240.6 237 237   BMI (kg/m2) 36.8 - - 37.1 37.1            Physical Exam        ASCVD  The ASCVD Risk score (Matilda ELTON Jr., et al., 2013) failed to calculate for the following reasons:    Cannot find a previous HDL lab    Cannot find a previous total cholesterol lab    Basic Labs  BMP  Lab Results   Component Value Date     02/07/2019    K 3.7 02/07/2019     02/07/2019    CO2 23 02/07/2019    BUN 10 02/07/2019    CREATININE 0.9 02/07/2019    CALCIUM 9.5 02/07/2019    ANIONGAP 13 02/07/2019    ESTGFRAFRICA >60.0 02/07/2019    EGFRNONAA >60.0 02/07/2019     Lab Results   Component Value Date    ALT 45 (H) 02/07/2019    AST 57 (H) 02/07/2019    ALKPHOS 51 (L) 02/07/2019    BILITOT 0.3 02/07/2019       No results found for: TSH    Lab Results   Component Value Date    WBC 7.31 04/23/2021    HGB 10.0 (L) 04/23/2021    HCT 31.4 (L) 04/23/2021    MCV 91 04/23/2021     04/23/2021           STD  Lab Results   Component Value Date    AAI87MMOF Negative 03/12/2021     Lab Results   Component Value Date    RPR Non-reactive 03/12/2021     No results found for: HAV, HEPAIGM, HEPBIGM, HEPBCAB, HBEAG, HEPCAB  Lab Results   Component Value Date    LABNGO Not Detected 05/30/2022    LABCHLA Not Detected 05/30/2022         Lipids  No results found for: CHOL  No results found for: HDL  No results found for: LDLCALC  No results found for: TRIG  No results found for: CHOLHDL    DM  No results found for: LABA1C, HGBA1C        No future appointments.        Medication List with Changes/Refills   Current Medications    ALLOPURINOL (ZYLOPRIM) 300 MG TABLET    Take 300 mg by mouth once daily.    COLCHICINE (COLCRYS) 0.6 MG TABLET    Take by mouth.    DOCUSATE SODIUM (COLACE) 100 MG CAPSULE    Take 100 mg po bid while on narcotics, hold for loose stools    ERGOCALCIFEROL (ERGOCALCIFEROL) 50,000 UNIT CAP    ergocalciferol (vitamin D2) 1,250 mcg (50,000 unit) capsule   TK 1 C PO 2 TIMES A WK    FUROSEMIDE (LASIX) 20 MG TABLET     Take 20 mg by mouth once daily.    GABAPENTIN (NEURONTIN) 300 MG CAPSULE    Take 1 capsule (300 mg total) by mouth 3 (three) times daily.    HYDROCHLOROTHIAZIDE (HYDRODIURIL) 25 MG TABLET    Take 25 mg by mouth once daily.    INDOMETHACIN (INDOCIN) 25 MG CAPSULE    Take 25 mg by mouth 2 (two) times daily.    LISINOPRIL (PRINIVIL,ZESTRIL) 20 MG TABLET    Take 20 mg by mouth every evening.     METRONIDAZOLE (FLAGYL) 500 MG TABLET    4 TABLETS IN A SINGLE DOSE.  MAY REFILL ONCE    ONDANSETRON (ZOFRAN-ODT) 4 MG TBDL    Take 1 tablet (4 mg total) by mouth every 8 (eight) hours as needed (severe nausea, vomiting).    OXYCODONE (ROXICODONE) 5 MG IMMEDIATE RELEASE TABLET    Take 1 tablet (5 mg total) by mouth every 8 (eight) hours as needed for Pain.    PANTOPRAZOLE (PROTONIX) 40 MG TABLET    Take 40 mg by mouth every morning.    POLYETHYLENE GLYCOL (GLYCOLAX) 17 GRAM PWPK    Take 17 g by mouth once daily. Take while on narcotics, hold for loose stools    TRIAMTERENE-HYDROCHLOROTHIAZIDE 37.5-25 MG (MAXZIDE-25) 37.5-25 MG PER TABLET    Take 1 tablet by mouth every morning.       Disclaimer:  This note has been generated using voice-recognition software. There may be grammatical or spelling errors that have been missed during proof-reading

## 2022-06-27 NOTE — PROGRESS NOTES
06/27/2022 Gap report audit performed. Care Everywhere updates requested and reviewed  Overdue HM topic chart audit and/or requested. LINKS triggered and reconciled. Media reviewed   HM Requested: [facility] sent to [facility] Maintenance Due   Topic Date Due    Hepatitis C Screening  Never done    COVID-19 Vaccine (1) Never done    TETANUS VACCINE  Never done    Colorectal Cancer Screening  Never done    Lipid Panel  03/13/2022    Mammogram  03/19/2022

## 2022-06-28 ENCOUNTER — PATIENT MESSAGE (OUTPATIENT)
Dept: INTERNAL MEDICINE | Facility: CLINIC | Age: 47
End: 2022-06-28
Payer: COMMERCIAL

## 2022-06-28 ENCOUNTER — PATIENT OUTREACH (OUTPATIENT)
Dept: ADMINISTRATIVE | Facility: HOSPITAL | Age: 47
End: 2022-06-28
Payer: COMMERCIAL

## 2022-06-28 DIAGNOSIS — R73.03 PREDIABETES: Primary | ICD-10-CM

## 2022-06-28 DIAGNOSIS — M79.89 LEG SWELLING: ICD-10-CM

## 2022-06-28 LAB — HCV AB SERPL QL IA: NEGATIVE

## 2022-06-28 NOTE — TELEPHONE ENCOUNTER
Lab Results   Component Value Date    WBC 6.30 06/27/2022    HGB 11.3 (L) 06/27/2022    HCT 35.1 (L) 06/27/2022    MCV 92 06/27/2022     06/27/2022         BMP  Lab Results   Component Value Date     (L) 06/27/2022    K 4.3 06/27/2022     06/27/2022    CO2 22 (L) 06/27/2022    BUN 7 06/27/2022    CREATININE 1.0 06/27/2022    CALCIUM 9.8 06/27/2022    ANIONGAP 10 06/27/2022    ESTGFRAFRICA >60.0 06/27/2022    EGFRNONAA >60.0 06/27/2022     Lab Results   Component Value Date    HGBA1C 5.7 (H) 06/27/2022     Diagnoses and all orders for this visit:    Prediabetes    Leg swelling  -     CV US Lower Extremity Veins Bilateral Insufficiency; Future

## 2022-06-28 NOTE — PROGRESS NOTES
06/28/2022 Gap report audit performed. Care Everywhere updates requested and reviewed  Overdue HM topic chart audit and/or requested. LINKS triggered and reconciled. Media reviewed  HM Updated Colonoscopy 2020    Health Maintenance Due   Topic Date Due    COVID-19 Vaccine (1) Never done    Mammogram  03/19/2022

## 2022-07-01 ENCOUNTER — HOSPITAL ENCOUNTER (OUTPATIENT)
Dept: RADIOLOGY | Facility: HOSPITAL | Age: 47
Discharge: HOME | End: 2022-07-01
Attending: INTERNAL MEDICINE
Payer: COMMERCIAL

## 2022-07-01 DIAGNOSIS — Z12.31 ENCOUNTER FOR SCREENING MAMMOGRAM FOR BREAST CANCER: ICD-10-CM

## 2022-07-01 PROCEDURE — 77063 MAMMO DIGITAL SCREENING BILAT WITH TOMO: ICD-10-PCS | Mod: 26,,, | Performed by: RADIOLOGY

## 2022-07-01 PROCEDURE — 77067 MAMMO DIGITAL SCREENING BILAT WITH TOMO: ICD-10-PCS | Mod: 26,,, | Performed by: RADIOLOGY

## 2022-07-01 PROCEDURE — 77067 SCR MAMMO BI INCL CAD: CPT | Mod: TC,PO

## 2022-07-01 PROCEDURE — 77063 BREAST TOMOSYNTHESIS BI: CPT | Mod: TC,PO

## 2022-07-01 PROCEDURE — 77067 SCR MAMMO BI INCL CAD: CPT | Mod: 26,,, | Performed by: RADIOLOGY

## 2022-07-01 PROCEDURE — 77063 BREAST TOMOSYNTHESIS BI: CPT | Mod: 26,,, | Performed by: RADIOLOGY

## 2022-07-26 ENCOUNTER — HOSPITAL ENCOUNTER (OUTPATIENT)
Dept: CARDIOLOGY | Facility: HOSPITAL | Age: 47
Discharge: HOME | End: 2022-07-26
Attending: INTERNAL MEDICINE
Payer: COMMERCIAL

## 2022-07-26 DIAGNOSIS — M79.89 LEG SWELLING: ICD-10-CM

## 2022-07-26 LAB
LEFT GREAT SAPHENOUS DISTAL THIGH DIA: 0.25 CM
LEFT GREAT SAPHENOUS JUNCTION DIA: 0.32 CM
LEFT GREAT SAPHENOUS KNEE DIA: 0.15 CM
LEFT GREAT SAPHENOUS MIDDLE THIGH DIA: 0.2 CM
LEFT GREAT SAPHENOUS PROXIMAL CALF DIA: 0.18 CM
LEFT SMALL SAPHENOUS SPJ DIA: 0.33 CM
RIGHT GREAT SAPHENOUS DISTAL THIGH DIA: 0.22 CM
RIGHT GREAT SAPHENOUS JUNCTION DIA: 0.45 CM
RIGHT GREAT SAPHENOUS KNEE DIA: 0.19 CM
RIGHT GREAT SAPHENOUS MIDDLE THIGH DIA: 0.31 CM
RIGHT GREAT SAPHENOUS PROXIMAL CALF DIA: 0.18 CM
RIGHT SMALL SAPHENOUS SPJ DIA: 0.28 CM

## 2022-07-26 PROCEDURE — 93970 CV US LOWER VENOUS INSUFFICIENCY BILATERAL (CUPID ONLY): ICD-10-PCS | Mod: 26,,, | Performed by: INTERNAL MEDICINE

## 2022-07-26 PROCEDURE — 93970 EXTREMITY STUDY: CPT | Mod: 26,,, | Performed by: INTERNAL MEDICINE

## 2022-07-26 PROCEDURE — 93970 EXTREMITY STUDY: CPT | Mod: TC

## 2022-10-10 ENCOUNTER — PATIENT MESSAGE (OUTPATIENT)
Dept: INTERNAL MEDICINE | Facility: CLINIC | Age: 47
End: 2022-10-10
Payer: COMMERCIAL

## 2022-10-10 DIAGNOSIS — M79.89 LEG SWELLING: ICD-10-CM

## 2022-10-10 RX ORDER — LISINOPRIL 20 MG/1
20 TABLET ORAL NIGHTLY
Qty: 90 TABLET | Refills: 1 | Status: SHIPPED | OUTPATIENT
Start: 2022-10-10 | End: 2023-02-23

## 2022-10-10 RX ORDER — FUROSEMIDE 20 MG/1
20 TABLET ORAL DAILY
Qty: 90 TABLET | Refills: 1
Start: 2022-10-10 | End: 2022-11-07 | Stop reason: SDUPTHER

## 2022-10-10 RX ORDER — COLCHICINE 0.6 MG/1
0.6 TABLET ORAL 2 TIMES DAILY
Qty: 30 TABLET | Refills: 0 | Status: SHIPPED | OUTPATIENT
Start: 2022-10-10 | End: 2023-02-23 | Stop reason: SDUPTHER

## 2022-10-10 RX ORDER — ALLOPURINOL 300 MG/1
300 TABLET ORAL DAILY
Qty: 90 TABLET | Refills: 1 | Status: SHIPPED | OUTPATIENT
Start: 2022-10-10 | End: 2023-05-11 | Stop reason: SDUPTHER

## 2022-10-14 ENCOUNTER — PATIENT OUTREACH (OUTPATIENT)
Dept: ADMINISTRATIVE | Facility: HOSPITAL | Age: 47
End: 2022-10-14
Payer: COMMERCIAL

## 2022-10-14 NOTE — PROGRESS NOTES
10/14/2022 Gap report audit performed. Care Everywhere updates requested and reviewed  Overdue HM topic chart audit and/or requested. LINKS triggered and reconciled. Media reviewed    Health Maintenance Due   Topic Date Due    COVID-19 Vaccine (1) Never done    Influenza Vaccine (1) 09/01/2022

## 2022-11-07 ENCOUNTER — PATIENT MESSAGE (OUTPATIENT)
Dept: INTERNAL MEDICINE | Facility: CLINIC | Age: 47
End: 2022-11-07

## 2022-11-07 DIAGNOSIS — M79.89 LEG SWELLING: ICD-10-CM

## 2022-11-07 NOTE — TELEPHONE ENCOUNTER
No new care gaps identified.  [facility] Embedded Care Gaps. Reference number: [id number]. 11/07/2022   9:05:58 AM CST

## 2022-11-08 RX ORDER — FUROSEMIDE 20 MG/1
20 TABLET ORAL DAILY
Qty: 90 TABLET | Refills: 1 | Status: SHIPPED | OUTPATIENT
Start: 2022-11-08 | End: 2023-02-23

## 2022-12-12 ENCOUNTER — TELEPHONE (OUTPATIENT)
Dept: INTERNAL MEDICINE | Facility: CLINIC | Age: 47
End: 2022-12-12
Payer: MEDICAID

## 2022-12-12 NOTE — TELEPHONE ENCOUNTER
The provider will not be in clinic on 12/16/2022. Apt canceled. Left voicemail. Awaiting a call back.

## 2023-02-07 ENCOUNTER — OFFICE VISIT (OUTPATIENT)
Dept: INTERNAL MEDICINE | Facility: CLINIC | Age: 48
End: 2023-02-07
Payer: MEDICAID

## 2023-02-07 VITALS
OXYGEN SATURATION: 99 % | DIASTOLIC BLOOD PRESSURE: 100 MMHG | HEART RATE: 88 BPM | BODY MASS INDEX: 34.95 KG/M2 | WEIGHT: 222.69 LBS | SYSTOLIC BLOOD PRESSURE: 154 MMHG | HEIGHT: 67 IN

## 2023-02-07 DIAGNOSIS — D64.9 NORMOCHROMIC ANEMIA: ICD-10-CM

## 2023-02-07 DIAGNOSIS — M10.9 GOUT, UNSPECIFIED CAUSE, UNSPECIFIED CHRONICITY, UNSPECIFIED SITE: ICD-10-CM

## 2023-02-07 DIAGNOSIS — Z23 NEED FOR VACCINATION: ICD-10-CM

## 2023-02-07 DIAGNOSIS — R73.03 PREDIABETES: ICD-10-CM

## 2023-02-07 DIAGNOSIS — I10 ESSENTIAL HYPERTENSION: Primary | ICD-10-CM

## 2023-02-07 DIAGNOSIS — E66.09 CLASS 1 OBESITY DUE TO EXCESS CALORIES WITH SERIOUS COMORBIDITY AND BODY MASS INDEX (BMI) OF 34.0 TO 34.9 IN ADULT: ICD-10-CM

## 2023-02-07 PROCEDURE — 99214 PR OFFICE/OUTPT VISIT, EST, LEVL IV, 30-39 MIN: ICD-10-PCS | Mod: S$PBB,,, | Performed by: INTERNAL MEDICINE

## 2023-02-07 PROCEDURE — 99999 PR PBB SHADOW E&M-EST. PATIENT-LVL IV: ICD-10-PCS | Mod: PBBFAC,,, | Performed by: INTERNAL MEDICINE

## 2023-02-07 PROCEDURE — 99999 PR PBB SHADOW E&M-EST. PATIENT-LVL IV: CPT | Mod: PBBFAC,,, | Performed by: INTERNAL MEDICINE

## 2023-02-07 PROCEDURE — 99214 OFFICE O/P EST MOD 30 MIN: CPT | Mod: PBBFAC,PO | Performed by: INTERNAL MEDICINE

## 2023-02-07 PROCEDURE — 99214 OFFICE O/P EST MOD 30 MIN: CPT | Mod: S$PBB,,, | Performed by: INTERNAL MEDICINE

## 2023-02-07 RX ORDER — SPIRONOLACTONE 25 MG/1
25 TABLET ORAL DAILY
Qty: 90 TABLET | Refills: 1 | Status: SHIPPED | OUTPATIENT
Start: 2023-02-07 | End: 2023-02-23

## 2023-02-07 RX ORDER — LOSARTAN POTASSIUM 100 MG/1
100 TABLET ORAL DAILY
Qty: 90 TABLET | Refills: 1 | Status: SHIPPED | OUTPATIENT
Start: 2023-02-07 | End: 2023-05-11 | Stop reason: SDUPTHER

## 2023-02-07 NOTE — PROGRESS NOTES
Assessment:       1. Essential hypertension    2. Need for vaccination    3. Prediabetes    4. Gout, unspecified cause, unspecified chronicity, unspecified site    5. Normochromic anemia          Plan:         José Miguel was seen today for follow-up, hypertension and knee pain.    Diagnoses and all orders for this visit:    Essential hypertensionChronic  Uncontrolled  Patient is not at goal today  I have reviewed lifestyle modification to achieve/maintain goals  We will adjust the current medication regimen to   Patient will follow up in 2 weeks  -     Basic Metabolic Panel; Future  -     Basic Metabolic Panel; Standing  -     losartan (COZAAR) 100 MG tablet; Take 1 tablet (100 mg total) by mouth once daily.  -     spironolactone (ALDACTONE) 25 MG tablet; Take 1 tablet (25 mg total) by mouth once daily.    Need for vaccination    Prediabetes  -     Hemoglobin A1C; Standing  -     Hepatic Function Panel; Standing  -     Lipid Panel; Standing    Gout, unspecified cause, unspecified chronicity, unspecified site  -     Uric Acid; Standing  -     Ambulatory referral/consult to Rheumatology; Future  -     Uric Acid; Future  -     X-Ray Knee 3 View Left; Future    Normochromic anemia  -     CBC Without Differential; Standing    Avoid ccb for LLE  Avoid hctz for gout  Kevon switch lisionpril to losartan 100  Add aldactone   Bmp in 2 weeks  3 months with prelabs       Subjective:       Patient ID: José Miguel Meng is a 47 y.o. female.    Chief Complaint: Follow-up, Hypertension, and Knee Pain      Interim Hx    Unclear if she is having recurrent  gout flares  The leg swelling has imrpoved  bilaterally    Concerns today    None     Chronic problems       Hypertension  This is a chronic problem. The current episode started more than 1 year ago. The problem has been waxing and waning since onset. The problem is uncontrolled. Past treatments include ACE inhibitors. The current treatment provides mild improvement. There are  "no compliance problems.      Review of Systems   All other systems reviewed and are negative.          Health Maintenance Due   Topic Date Due    COVID-19 Vaccine (1) Never done    Influenza Vaccine (1) 09/01/2022       Objective:     BP (!) 154/100 (BP Location: Left arm, Patient Position: Sitting, BP Method: Large (Manual))   Pulse 88   Ht 5' 7" (1.702 m)   Wt 101 kg (222 lb 10.6 oz)   LMP 04/07/2016 (Exact Date)   SpO2 99%   BMI 34.87 kg/m²     Vitals 2/7/2023 6/27/2022 5/30/2022 4/23/2021 3/19/2021   Height 67 67 - - 67   Weight (lbs) 222.66 234.79 237.6 240.6 237   BMI (kg/m2) 34.9 36.8 - - 37.1              Physical Exam  Vitals and nursing note reviewed.   Constitutional:       General: She is not in acute distress.     Appearance: Normal appearance. She is well-developed. She is not diaphoretic.   HENT:      Head: Normocephalic.      Nose: Nose normal.   Eyes:      General:         Right eye: No discharge.         Left eye: No discharge.      Conjunctiva/sclera: Conjunctivae normal.      Pupils: Pupils are equal, round, and reactive to light.   Cardiovascular:      Rate and Rhythm: Normal rate and regular rhythm.      Heart sounds: Normal heart sounds. No murmur heard.    No friction rub. No gallop.   Pulmonary:      Effort: Pulmonary effort is normal. No respiratory distress.      Breath sounds: Normal breath sounds. No stridor.   Abdominal:      General: Bowel sounds are normal. There is no distension.      Palpations: Abdomen is soft.   Musculoskeletal:         General: No deformity. Normal range of motion.      Cervical back: Normal range of motion and neck supple.      Comments: Inspection; no obvious deformity, swelling, erythema  Palpation; no crepitus, warmth   ROM:   - Passive: full  - Active: full  Maneuvers; No laxity to Anterior drawer, Lachman,Posterior drawer, Valgus, varus  Negative Thessaly - Medial-lateral grind, Noble     Skin:     General: Skin is warm.   Neurological:      Mental " Status: She is alert and oriented to person, place, and time.      Cranial Nerves: No cranial nerve deficit.           ASCVD  The 10-year ASCVD risk score (Lavonne MO, et al., 2019) is: 5.4%    Values used to calculate the score:      Age: 47 years      Sex: Female      Is Non- : Yes      Diabetic: No      Tobacco smoker: No      Systolic Blood Pressure: 154 mmHg      Is BP treated: Yes      HDL Cholesterol: 62 mg/dL      Total Cholesterol: 212 mg/dL    Basic Labs    BMP  Lab Results   Component Value Date     (L) 06/27/2022    K 4.3 06/27/2022     06/27/2022    CO2 22 (L) 06/27/2022    BUN 7 06/27/2022    CREATININE 1.0 06/27/2022    CALCIUM 9.8 06/27/2022    ANIONGAP 10 06/27/2022    ESTGFRAFRICA >60.0 06/27/2022    EGFRNONAA >60.0 06/27/2022     No results found for: EGFRNORACEVR    Lab Results   Component Value Date    ALT 34 06/27/2022    AST 32 06/27/2022    ALKPHOS 64 06/27/2022    BILITOT 0.4 06/27/2022         Lab Results   Component Value Date    TSH 0.964 06/27/2022     Lab Results   Component Value Date    WBC 6.30 06/27/2022    HGB 11.3 (L) 06/27/2022    HCT 35.1 (L) 06/27/2022    MCV 92 06/27/2022     06/27/2022           STD  Lab Results   Component Value Date    XTP57CRGL Negative 03/12/2021     Lab Results   Component Value Date    RPR Non-reactive 03/12/2021     Lab Results   Component Value Date    HEPCAB Negative 06/27/2022     Lab Results   Component Value Date    LABNGO Not Detected 05/30/2022    LABCHLA Not Detected 05/30/2022           Lipids  Lab Results   Component Value Date    CHOL 212 (H) 06/27/2022     Lab Results   Component Value Date    HDL 62 06/27/2022     Lab Results   Component Value Date    LDLCALC 113.4 06/27/2022     Lab Results   Component Value Date    TRIG 183 (H) 06/27/2022     Lab Results   Component Value Date    CHOLHDL 29.2 06/27/2022       DM  Lab Results   Component Value Date    HGBA1C 5.7 (H) 06/27/2022       Future  Appointments   Date Time Provider Department Center   2/8/2023  9:00 AM JAZ XR1 500 LB LIMIT [facility] [place]   2/17/2023  7:00 AM LAB, ONEL SARKAR [facility] [place]   2/23/2023  8:40 AM [facility]   5/8/2023  7:15 AM LAB, ONEL MELLO [facility] [place]   5/10/2023  9:30 AM Tony Rodrigues MD [facility] [place]   5/11/2023 11:00 AM Steve Skelton III, MD [facility]           Medication List with Changes/Refills   New Medications    LOSARTAN (COZAAR) 100 MG TABLET    Take 1 tablet (100 mg total) by mouth once daily.    SPIRONOLACTONE (ALDACTONE) 25 MG TABLET    Take 1 tablet (25 mg total) by mouth once daily.   Current Medications    ALLOPURINOL (ZYLOPRIM) 300 MG TABLET    Take 1 tablet (300 mg total) by mouth once daily.    COLCHICINE (COLCRYS) 0.6 MG TABLET    Take 1 tablet (0.6 mg total) by mouth 2 (two) times daily.    ERGOCALCIFEROL (ERGOCALCIFEROL) 50,000 UNIT CAP    ergocalciferol (vitamin D2) 1,250 mcg (50,000 unit) capsule   TK 1 C PO 2 TIMES A WK    FUROSEMIDE (LASIX) 20 MG TABLET    Take 1 tablet (20 mg total) by mouth once daily.    INDOMETHACIN (INDOCIN) 25 MG CAPSULE    Take 25 mg by mouth 2 (two) times daily.    LISINOPRIL (PRINIVIL,ZESTRIL) 20 MG TABLET    Take 1 tablet (20 mg total) by mouth every evening.    PANTOPRAZOLE (PROTONIX) 40 MG TABLET    Take 1 tablet (40 mg total) by mouth every morning.         Disclaimer:  This note has been generated using voice-recognition software. There may be grammatical or spelling errors that have been missed during proof-reading

## 2023-02-08 ENCOUNTER — HOSPITAL ENCOUNTER (OUTPATIENT)
Dept: RADIOLOGY | Facility: HOSPITAL | Age: 48
Discharge: HOME | End: 2023-02-08
Attending: INTERNAL MEDICINE
Payer: MEDICAID

## 2023-02-08 DIAGNOSIS — M10.9 GOUT, UNSPECIFIED CAUSE, UNSPECIFIED CHRONICITY, UNSPECIFIED SITE: ICD-10-CM

## 2023-02-08 PROCEDURE — 73562 X-RAY EXAM OF KNEE 3: CPT | Mod: 26,LT,, | Performed by: RADIOLOGY

## 2023-02-08 PROCEDURE — 73562 XR KNEE 3 VIEW LEFT: ICD-10-PCS | Mod: 26,LT,, | Performed by: RADIOLOGY

## 2023-02-08 PROCEDURE — 73562 X-RAY EXAM OF KNEE 3: CPT | Mod: TC,FY,PO,LT

## 2023-02-17 ENCOUNTER — LAB VISIT (OUTPATIENT)
Dept: LAB | Facility: HOSPITAL | Age: 48
End: 2023-02-17
Attending: INTERNAL MEDICINE
Payer: MEDICAID

## 2023-02-17 DIAGNOSIS — I10 ESSENTIAL HYPERTENSION: ICD-10-CM

## 2023-02-17 DIAGNOSIS — M10.9 GOUT, UNSPECIFIED CAUSE, UNSPECIFIED CHRONICITY, UNSPECIFIED SITE: ICD-10-CM

## 2023-02-17 LAB
ANION GAP SERPL CALC-SCNC: 8 MMOL/L (ref 8–16)
BUN SERPL-MCNC: 8 MG/DL (ref 6–20)
CALCIUM SERPL-MCNC: 9.3 MG/DL (ref 8.7–10.5)
CHLORIDE SERPL-SCNC: 102 MMOL/L (ref 95–110)
CO2 SERPL-SCNC: 22 MMOL/L (ref 23–29)
CREAT SERPL-MCNC: 0.9 MG/DL (ref 0.5–1.4)
EST. GFR  (NO RACE VARIABLE): >60 ML/MIN/1.73 M^2
GLUCOSE SERPL-MCNC: 99 MG/DL (ref 70–110)
POTASSIUM SERPL-SCNC: 3.9 MMOL/L (ref 3.5–5.1)
SODIUM SERPL-SCNC: 132 MMOL/L (ref 136–145)
URATE SERPL-MCNC: 2.4 MG/DL (ref 2.4–5.7)

## 2023-02-17 PROCEDURE — 80048 BASIC METABOLIC PNL TOTAL CA: CPT | Performed by: INTERNAL MEDICINE

## 2023-02-17 PROCEDURE — 84550 ASSAY OF BLOOD/URIC ACID: CPT | Performed by: INTERNAL MEDICINE

## 2023-02-17 PROCEDURE — 36415 COLL VENOUS BLD VENIPUNCTURE: CPT | Mod: PO | Performed by: INTERNAL MEDICINE

## 2023-02-22 NOTE — PROGRESS NOTES
Assessment:       1. Essential hypertension    2. Need for vaccination    3. Class 2 severe obesity with body mass index (BMI) of 35 to 39.9 with serious comorbidity    4. Primary osteoarthritis of knee, unspecified laterality    5. Gastroesophageal reflux disease, unspecified whether esophagitis present    6. Gout, unspecified cause, unspecified chronicity, unspecified site          Plan:         José Miguel was seen today for hypertension, medication refill and nausea.    Diagnoses and all orders for this visit:    Essential hypertension  Chronic  Uncontrolled  Patient is not at goal today  I have reviewed lifestyle modification to achieve/maintain goals  We will adjust the current medication regimen to   Patient will follow up in 2 weeks  -     spironolactone (ALDACTONE) 100 MG tablet; Take 1 tablet (100 mg total) by mouth once daily.    Need for vaccination    Class 2 severe obesity with body mass index (BMI) of 35 to 39.9 with serious comorbidity    Primary osteoarthritis of knee, unspecified laterality  -     Ambulatory referral/consult to Orthopedics; Future    Gastroesophageal reflux disease, unspecified whether esophagitis present  -     pantoprazole (PROTONIX) 40 MG tablet; Take 1 tablet (40 mg total) by mouth every morning.    Gout, unspecified cause, unspecified chronicity, unspecified site  -     colchicine (COLCRYS) 0.6 mg tablet; Take 1 tablet (0.6 mg total) by mouth 2 (two) times daily.      Ortho referrral for knee   Refill ppi and colchine  Increase aldactone 50 then 100 if still not controlled  Fu in 2 week  Fu in 3 month with prelabs      Subjective:       Patient ID: José Miguel Meng is a 47 y.o. female.    Chief Complaint: Hypertension, Medication Refill, and Nausea      Interim Hx  None    Concerns today  Refill pantoprazole . Continues to endorse knee pain    Chronic problems       Hypertension  This is a chronic problem. The current episode started more than 1 year ago. The problem has  "been waxing and waning since onset. The problem is uncontrolled. Past treatments include ACE inhibitors (MRA). The current treatment provides mild improvement. There are no compliance problems.      Review of Systems   All other systems reviewed and are negative.          Health Maintenance Due   Topic Date Due    COVID-19 Vaccine (1) Never done    Influenza Vaccine (1) 09/01/2022       Objective:     BP (!) 142/106 (BP Location: Right arm, Patient Position: Sitting, BP Method: Large (Manual))   Pulse (!) 111   Ht 5' 7" (1.702 m)   Wt 102.3 kg (225 lb 8.5 oz)   LMP 04/07/2016 (Exact Date)   SpO2 99%   BMI 35.32 kg/m²     Vitals 2/23/2023 2/7/2023 6/27/2022 5/30/2022 4/23/2021   Height 67 67 67 - -   Weight (lbs) 225.53 222.66 234.79 237.6 240.6   BMI (kg/m2) 35.3 34.9 36.8 - -            Physical Exam  Vitals and nursing note reviewed.   Constitutional:       General: She is not in acute distress.     Appearance: She is well-developed. She is not diaphoretic.   HENT:      Head: Normocephalic.      Nose: Nose normal.   Eyes:      General:         Right eye: No discharge.         Left eye: No discharge.      Conjunctiva/sclera: Conjunctivae normal.      Pupils: Pupils are equal, round, and reactive to light.   Cardiovascular:      Rate and Rhythm: Normal rate and regular rhythm.      Heart sounds: Normal heart sounds. No murmur heard.    No friction rub. No gallop.   Pulmonary:      Effort: Pulmonary effort is normal. No respiratory distress.   Abdominal:      General: Bowel sounds are normal. There is no distension.      Palpations: Abdomen is soft.   Musculoskeletal:         General: No deformity. Normal range of motion.      Cervical back: Normal range of motion.   Skin:     General: Skin is warm.   Neurological:      Mental Status: She is alert and oriented to person, place, and time.      Cranial Nerves: No cranial nerve deficit.         Recent Results (from the past 336 hour(s))   Basic Metabolic Panel    " Collection Time: 02/17/23  7:18 AM   Result Value Ref Range    Sodium 132 (L) 136 - 145 mmol/L    Potassium 3.9 3.5 - 5.1 mmol/L    Chloride 102 95 - 110 mmol/L    CO2 22 (L) 23 - 29 mmol/L    Glucose 99 70 - 110 mg/dL    BUN 8 6 - 20 mg/dL    Creatinine 0.9 0.5 - 1.4 mg/dL    Calcium 9.3 8.7 - 10.5 mg/dL    Anion Gap 8 8 - 16 mmol/L    eGFR >60.0 >60 mL/min/1.73 m^2   Uric Acid    Collection Time: 02/17/23  7:18 AM   Result Value Ref Range    Uric Acid 2.4 2.4 - 5.7 mg/dL           Future Appointments   Date Time Provider Department Center   3/9/2023  9:20 AM INJECTION, ONEL RAMON [facility] MED [place]   5/8/2023  7:15 AM LABONEL LAB [place]   5/10/2023  9:30 [facility] [place]   5/11/2023 11:00 AM Steve Skelton III, MD [facility]         Medication List with Changes/Refills   Current Medications    ALLOPURINOL (ZYLOPRIM) 300 MG TABLET    Take 1 tablet (300 mg total) by mouth once daily.    INDOMETHACIN (INDOCIN) 25 MG CAPSULE    Take 25 mg by mouth 2 (two) times daily.    LOSARTAN (COZAAR) 100 MG TABLET    Take 1 tablet (100 mg total) by mouth once daily.   Changed and/or Refilled Medications    Modified Medication Previous Medication    COLCHICINE (COLCRYS) 0.6 MG TABLET colchicine (COLCRYS) 0.6 mg tablet       Take 1 tablet (0.6 mg total) by mouth 2 (two) times daily.    Take 1 tablet (0.6 mg total) by mouth 2 (two) times daily.    PANTOPRAZOLE (PROTONIX) 40 MG TABLET pantoprazole (PROTONIX) 40 MG tablet       Take 1 tablet (40 mg total) by mouth every morning.    Take 1 tablet (40 mg total) by mouth every morning.    SPIRONOLACTONE (ALDACTONE) 100 MG TABLET spironolactone (ALDACTONE) 25 MG tablet       Take 1 tablet (100 mg total) by mouth once daily.    Take 1 tablet (25 mg total) by mouth once daily.   Discontinued Medications    FUROSEMIDE (LASIX) 20 MG TABLET    Take 1 tablet (20 mg total) by mouth once daily.    LISINOPRIL (PRINIVIL,ZESTRIL) 20 MG TABLET     Take 1 tablet (20 mg total) by mouth every evening.         Disclaimer:  This note has been generated using voice-recognition software. There may be grammatical or spelling errors that have been missed during proof-reading

## 2023-02-23 ENCOUNTER — OFFICE VISIT (OUTPATIENT)
Dept: INTERNAL MEDICINE | Facility: CLINIC | Age: 48
End: 2023-02-23
Payer: MEDICAID

## 2023-02-23 VITALS
DIASTOLIC BLOOD PRESSURE: 106 MMHG | HEIGHT: 67 IN | BODY MASS INDEX: 35.39 KG/M2 | OXYGEN SATURATION: 99 % | HEART RATE: 97 BPM | SYSTOLIC BLOOD PRESSURE: 142 MMHG | WEIGHT: 225.5 LBS

## 2023-02-23 DIAGNOSIS — K21.9 GASTROESOPHAGEAL REFLUX DISEASE, UNSPECIFIED WHETHER ESOPHAGITIS PRESENT: ICD-10-CM

## 2023-02-23 DIAGNOSIS — E66.01 CLASS 2 SEVERE OBESITY WITH BODY MASS INDEX (BMI) OF 35 TO 39.9 WITH SERIOUS COMORBIDITY: ICD-10-CM

## 2023-02-23 DIAGNOSIS — I10 ESSENTIAL HYPERTENSION: Primary | ICD-10-CM

## 2023-02-23 DIAGNOSIS — M10.9 GOUT, UNSPECIFIED CAUSE, UNSPECIFIED CHRONICITY, UNSPECIFIED SITE: ICD-10-CM

## 2023-02-23 DIAGNOSIS — M17.10 PRIMARY OSTEOARTHRITIS OF KNEE, UNSPECIFIED LATERALITY: ICD-10-CM

## 2023-02-23 DIAGNOSIS — Z23 NEED FOR VACCINATION: ICD-10-CM

## 2023-02-23 PROCEDURE — 99214 PR OFFICE/OUTPT VISIT, EST, LEVL IV, 30-39 MIN: ICD-10-PCS | Mod: S$PBB,,, | Performed by: INTERNAL MEDICINE

## 2023-02-23 PROCEDURE — 99999 PR PBB SHADOW E&M-EST. PATIENT-LVL IV: ICD-10-PCS | Mod: PBBFAC,,, | Performed by: INTERNAL MEDICINE

## 2023-02-23 PROCEDURE — 99999 PR PBB SHADOW E&M-EST. PATIENT-LVL IV: CPT | Mod: PBBFAC,,, | Performed by: INTERNAL MEDICINE

## 2023-02-23 PROCEDURE — 99214 OFFICE O/P EST MOD 30 MIN: CPT | Mod: PBBFAC,PO | Performed by: INTERNAL MEDICINE

## 2023-02-23 PROCEDURE — 99214 OFFICE O/P EST MOD 30 MIN: CPT | Mod: S$PBB,,, | Performed by: INTERNAL MEDICINE

## 2023-02-23 RX ORDER — SPIRONOLACTONE 100 MG/1
100 TABLET, FILM COATED ORAL DAILY
Qty: 90 TABLET | Refills: 1 | Status: SHIPPED | OUTPATIENT
Start: 2023-02-23 | End: 2023-05-11 | Stop reason: SDUPTHER

## 2023-02-23 RX ORDER — PANTOPRAZOLE SODIUM 40 MG/1
40 TABLET, DELAYED RELEASE ORAL EVERY MORNING
Qty: 90 TABLET | Refills: 1 | Status: SHIPPED | OUTPATIENT
Start: 2023-02-23 | End: 2023-05-11 | Stop reason: SDUPTHER

## 2023-02-23 RX ORDER — COLCHICINE 0.6 MG/1
0.6 TABLET ORAL 2 TIMES DAILY
Qty: 30 TABLET | Refills: 0 | Status: SHIPPED | OUTPATIENT
Start: 2023-02-23 | End: 2023-05-11 | Stop reason: SDUPTHER

## 2023-02-23 NOTE — PATIENT INSTRUCTIONS
Please increase to aldactone 50 mg daily for the next week  If the blood pressures are still elevated increase to 100mg daily

## 2023-03-03 ENCOUNTER — OFFICE VISIT (OUTPATIENT)
Dept: ORTHOPEDICS | Facility: CLINIC | Age: 48
End: 2023-03-03
Payer: MEDICAID

## 2023-03-03 VITALS — HEIGHT: 67 IN | BODY MASS INDEX: 35.39 KG/M2 | WEIGHT: 225.5 LBS

## 2023-03-03 DIAGNOSIS — M17.12 PRIMARY OSTEOARTHRITIS OF LEFT KNEE: ICD-10-CM

## 2023-03-03 PROCEDURE — 3008F BODY MASS INDEX DOCD: CPT | Mod: CPTII,,, | Performed by: ORTHOPAEDIC SURGERY

## 2023-03-03 PROCEDURE — 99202 PR OFFICE/OUTPT VISIT, NEW, LEVL II, 15-29 MIN: ICD-10-PCS | Mod: S$PBB,,, | Performed by: ORTHOPAEDIC SURGERY

## 2023-03-03 PROCEDURE — 99999 PR PBB SHADOW E&M-EST. PATIENT-LVL III: ICD-10-PCS | Mod: PBBFAC,,, | Performed by: ORTHOPAEDIC SURGERY

## 2023-03-03 PROCEDURE — 99999 PR PBB SHADOW E&M-EST. PATIENT-LVL III: CPT | Mod: PBBFAC,,, | Performed by: ORTHOPAEDIC SURGERY

## 2023-03-03 PROCEDURE — 1160F RVW MEDS BY RX/DR IN RCRD: CPT | Mod: CPTII,,, | Performed by: ORTHOPAEDIC SURGERY

## 2023-03-03 PROCEDURE — 4010F PR ACE/ARB THEARPY RXD/TAKEN: ICD-10-PCS | Mod: CPTII,,, | Performed by: ORTHOPAEDIC SURGERY

## 2023-03-03 PROCEDURE — 1159F MED LIST DOCD IN RCRD: CPT | Mod: CPTII,,, | Performed by: ORTHOPAEDIC SURGERY

## 2023-03-03 PROCEDURE — 3008F PR BODY MASS INDEX (BMI) DOCUMENTED: ICD-10-PCS | Mod: CPTII,,, | Performed by: ORTHOPAEDIC SURGERY

## 2023-03-03 PROCEDURE — 4010F ACE/ARB THERAPY RXD/TAKEN: CPT | Mod: CPTII,,, | Performed by: ORTHOPAEDIC SURGERY

## 2023-03-03 PROCEDURE — 99213 OFFICE O/P EST LOW 20 MIN: CPT | Mod: PBBFAC,PN | Performed by: ORTHOPAEDIC SURGERY

## 2023-03-03 PROCEDURE — 99202 OFFICE O/P NEW SF 15 MIN: CPT | Mod: S$PBB,,, | Performed by: ORTHOPAEDIC SURGERY

## 2023-03-03 PROCEDURE — 1159F PR MEDICATION LIST DOCUMENTED IN MEDICAL RECORD: ICD-10-PCS | Mod: CPTII,,, | Performed by: ORTHOPAEDIC SURGERY

## 2023-03-03 PROCEDURE — 1160F PR REVIEW ALL MEDS BY PRESCRIBER/CLIN PHARMACIST DOCUMENTED: ICD-10-PCS | Mod: CPTII,,, | Performed by: ORTHOPAEDIC SURGERY

## 2023-03-03 RX ORDER — INDOMETHACIN 50 MG/1
50 CAPSULE ORAL 2 TIMES DAILY WITH MEALS
Qty: 60 CAPSULE | Refills: 2 | Status: SHIPPED | OUTPATIENT
Start: 2023-03-03

## 2023-03-03 NOTE — PROGRESS NOTES
Subjective:      Patient ID: José Miguel Meng is a 47 y.o. female.    Chief Complaint: Consult and Knee Pain (left )    HPI     They have experienced problems with their left knee over the past several weeks. The patient denies relevant history of injury/aggravation.  The patient does report a history of gout.  Pain is located diffusely Associated symptoms include NA.  Symptoms are aggravated by no particular activity. They have been treated with occasional Indocin.   Symptoms have recently stayed the same. Ambulation reportedly has not been impaired. Self care ADLs are not painful.     Review of Systems   Constitutional: Negative for fever and weight loss.   HENT:  Negative for congestion.    Eyes:  Negative for visual disturbance.   Cardiovascular:  Negative for chest pain.   Respiratory:  Negative for shortness of breath.    Hematologic/Lymphatic: Negative for bleeding problem. Does not bruise/bleed easily.   Skin:  Negative for poor wound healing.   Musculoskeletal:  Positive for joint pain.   Gastrointestinal:  Negative for abdominal pain.   Genitourinary:  Negative for dysuria.   Neurological:  Negative for seizures.   Psychiatric/Behavioral:  Negative for altered mental status.    Allergic/Immunologic: Negative for persistent infections.       Objective:      Ortho/SPM Exam        Left knee    The patient is not in acute distress.   Body habitus is normal.   Sclera appear normal  No respiratory distress  The patient walks without a limp.  Resisted SLR negative.   The skin over the knee is intact.  Knee effusion trace  Tendernes is located absent.  Range of motion- Flexion full, Extension full.   Ligament exam:   MCL trace laxity   Lachman intact              Post sag intact    LCL intact  Patellar apprehension negative.  Popliteal cyst present  Patellar crepitation absent.  Flexion/pinch negative.  Pulses DP present, PT present.  Motor normal 5/5 strength in all tested muscle groups.   Sensory  normal.    I reviewed the relevant imaging for the patient's condition:  Left knee films which appear to be nonweightbearing show mild medial narrowing.  No visible chondrocalcinosis    Recent uric acid is 2.4      Assessment:       Encounter Diagnosis   Name Primary?    Primary osteoarthritis of knee, unspecified laterality             The patient appears to have structurally mild medial compartment osteoarthritis.  If these films are not weight-bearing, weight-bearing films might show more narrowing.    I am also suspicious that the patient may have pseudogout rather than gout, based on recent uric acid.      Plan:       José Miguel was seen today for consult and knee pain.    Diagnoses and all orders for this visit:    Primary osteoarthritis of knee, unspecified laterality  -     Ambulatory referral/consult to Orthopedics            I explained my diagnostic impression and the reasoning behind it in detail, using layman's terms.  Models and/or pictures were used to help in the explanation.    I advised the patient to use Indocin daily for 1 month.    Physical therapy referral    Appropriate activity modification discussed avoiding hyperflexion    If symptoms persist or worsen new weight-bearing radiographs consideration of injection would be reasonable.

## 2023-03-28 PROBLEM — R29.898 DECREASED STRENGTH OF LOWER EXTREMITY: Status: ACTIVE | Noted: 2023-03-28

## 2023-03-28 PROBLEM — Z78.9 IMPAIRED MOBILITY AND ACTIVITIES OF DAILY LIVING: Status: ACTIVE | Noted: 2023-03-28

## 2023-03-28 PROBLEM — Z74.09 IMPAIRED MOBILITY AND ACTIVITIES OF DAILY LIVING: Status: ACTIVE | Noted: 2023-03-28

## 2023-04-11 ENCOUNTER — HOSPITAL ENCOUNTER (EMERGENCY)
Facility: HOSPITAL | Age: 48
End: 2023-04-11
Attending: EMERGENCY MEDICINE
Payer: MEDICAID

## 2023-04-11 VITALS
WEIGHT: 220 LBS | RESPIRATION RATE: 18 BRPM | HEART RATE: 93 BPM | BODY MASS INDEX: 34.46 KG/M2 | DIASTOLIC BLOOD PRESSURE: 99 MMHG | OXYGEN SATURATION: 100 % | SYSTOLIC BLOOD PRESSURE: 157 MMHG | TEMPERATURE: 98 F

## 2023-04-11 DIAGNOSIS — S02.609B OPEN FRACTURE OF MANDIBLE, UNSPECIFIED LATERALITY, UNSPECIFIED MANDIBULAR SITE, INITIAL ENCOUNTER: Primary | ICD-10-CM

## 2023-04-11 DIAGNOSIS — E87.20 LACTIC ACIDOSIS: ICD-10-CM

## 2023-04-11 DIAGNOSIS — R55 SYNCOPE: ICD-10-CM

## 2023-04-11 LAB
ALBUMIN SERPL BCP-MCNC: 3.5 G/DL (ref 3.5–5.2)
ALP SERPL-CCNC: 64 U/L (ref 55–135)
ALT SERPL W/O P-5'-P-CCNC: 22 U/L (ref 10–44)
ANION GAP SERPL CALC-SCNC: 13 MMOL/L (ref 8–16)
AST SERPL-CCNC: 40 U/L (ref 10–40)
BASOPHILS # BLD AUTO: 0.03 K/UL (ref 0–0.2)
BASOPHILS NFR BLD: 0.4 % (ref 0–1.9)
BILIRUB SERPL-MCNC: 0.2 MG/DL (ref 0.1–1)
BNP SERPL-MCNC: <10 PG/ML (ref 0–99)
BUN SERPL-MCNC: 8 MG/DL (ref 6–20)
CALCIUM SERPL-MCNC: 8.6 MG/DL (ref 8.7–10.5)
CHLORIDE SERPL-SCNC: 103 MMOL/L (ref 95–110)
CO2 SERPL-SCNC: 16 MMOL/L (ref 23–29)
CREAT SERPL-MCNC: 1 MG/DL (ref 0.5–1.4)
D DIMER PPP IA.FEU-MCNC: 0.29 MG/L FEU
DIFFERENTIAL METHOD: ABNORMAL
EOSINOPHIL # BLD AUTO: 0 K/UL (ref 0–0.5)
EOSINOPHIL NFR BLD: 0.4 % (ref 0–8)
ERYTHROCYTE [DISTWIDTH] IN BLOOD BY AUTOMATED COUNT: 13.3 % (ref 11.5–14.5)
EST. GFR  (NO RACE VARIABLE): >60 ML/MIN/1.73 M^2
ETHANOL SERPL-MCNC: 48 MG/DL
GLUCOSE SERPL-MCNC: 161 MG/DL (ref 70–110)
HCT VFR BLD AUTO: 31.4 % (ref 37–48.5)
HGB BLD-MCNC: 10.5 G/DL (ref 12–16)
IMM GRANULOCYTES # BLD AUTO: 0.04 K/UL (ref 0–0.04)
IMM GRANULOCYTES NFR BLD AUTO: 0.6 % (ref 0–0.5)
LACTATE SERPL-SCNC: 3.8 MMOL/L (ref 0.5–2.2)
LYMPHOCYTES # BLD AUTO: 2 K/UL (ref 1–4.8)
LYMPHOCYTES NFR BLD: 29 % (ref 18–48)
MCH RBC QN AUTO: 29.8 PG (ref 27–31)
MCHC RBC AUTO-ENTMCNC: 33.4 G/DL (ref 32–36)
MCV RBC AUTO: 89 FL (ref 82–98)
MONOCYTES # BLD AUTO: 0.5 K/UL (ref 0.3–1)
MONOCYTES NFR BLD: 7.1 % (ref 4–15)
NEUTROPHILS # BLD AUTO: 4.2 K/UL (ref 1.8–7.7)
NEUTROPHILS NFR BLD: 62.5 % (ref 38–73)
NRBC BLD-RTO: 0 /100 WBC
PLATELET # BLD AUTO: 209 K/UL (ref 150–450)
PMV BLD AUTO: 10.2 FL (ref 9.2–12.9)
POTASSIUM SERPL-SCNC: 4 MMOL/L (ref 3.5–5.1)
PROT SERPL-MCNC: 7 G/DL (ref 6–8.4)
RBC # BLD AUTO: 3.52 M/UL (ref 4–5.4)
SODIUM SERPL-SCNC: 132 MMOL/L (ref 136–145)
TROPONIN I SERPL DL<=0.01 NG/ML-MCNC: <0.006 NG/ML (ref 0–0.03)
WBC # BLD AUTO: 6.79 K/UL (ref 3.9–12.7)

## 2023-04-11 PROCEDURE — 85379 FIBRIN DEGRADATION QUANT: CPT | Performed by: EMERGENCY MEDICINE

## 2023-04-11 PROCEDURE — 84484 ASSAY OF TROPONIN QUANT: CPT | Performed by: EMERGENCY MEDICINE

## 2023-04-11 PROCEDURE — 25000003 PHARM REV CODE 250: Performed by: EMERGENCY MEDICINE

## 2023-04-11 PROCEDURE — 96361 HYDRATE IV INFUSION ADD-ON: CPT

## 2023-04-11 PROCEDURE — 96375 TX/PRO/DX INJ NEW DRUG ADDON: CPT

## 2023-04-11 PROCEDURE — 82077 ASSAY SPEC XCP UR&BREATH IA: CPT | Performed by: EMERGENCY MEDICINE

## 2023-04-11 PROCEDURE — 93005 ELECTROCARDIOGRAM TRACING: CPT

## 2023-04-11 PROCEDURE — 83880 ASSAY OF NATRIURETIC PEPTIDE: CPT | Performed by: EMERGENCY MEDICINE

## 2023-04-11 PROCEDURE — 99291 CRITICAL CARE FIRST HOUR: CPT

## 2023-04-11 PROCEDURE — 93010 ELECTROCARDIOGRAM REPORT: CPT | Mod: ,,, | Performed by: INTERNAL MEDICINE

## 2023-04-11 PROCEDURE — 93010 EKG 12-LEAD: ICD-10-PCS | Mod: ,,, | Performed by: INTERNAL MEDICINE

## 2023-04-11 PROCEDURE — 63600175 PHARM REV CODE 636 W HCPCS: Performed by: EMERGENCY MEDICINE

## 2023-04-11 PROCEDURE — 80053 COMPREHEN METABOLIC PANEL: CPT | Performed by: EMERGENCY MEDICINE

## 2023-04-11 PROCEDURE — 83605 ASSAY OF LACTIC ACID: CPT | Performed by: EMERGENCY MEDICINE

## 2023-04-11 PROCEDURE — 85025 COMPLETE CBC W/AUTO DIFF WBC: CPT | Performed by: EMERGENCY MEDICINE

## 2023-04-11 PROCEDURE — 96365 THER/PROPH/DIAG IV INF INIT: CPT

## 2023-04-11 RX ORDER — CEFAZOLIN SODIUM 2 G/50ML
2 SOLUTION INTRAVENOUS ONCE
Status: DISCONTINUED | OUTPATIENT
Start: 2023-04-11 | End: 2023-04-11

## 2023-04-11 RX ORDER — ONDANSETRON 2 MG/ML
4 INJECTION INTRAMUSCULAR; INTRAVENOUS
Status: DISCONTINUED | OUTPATIENT
Start: 2023-04-11 | End: 2023-04-11 | Stop reason: HOSPADM

## 2023-04-11 RX ORDER — CEFAZOLIN SODIUM 2 G/50ML
2 SOLUTION INTRAVENOUS ONCE
Status: COMPLETED | OUTPATIENT
Start: 2023-04-11 | End: 2023-04-11

## 2023-04-11 RX ORDER — FENTANYL CITRATE 50 UG/ML
50 INJECTION, SOLUTION INTRAMUSCULAR; INTRAVENOUS
Status: COMPLETED | OUTPATIENT
Start: 2023-04-11 | End: 2023-04-11

## 2023-04-11 RX ORDER — MORPHINE SULFATE 4 MG/ML
4 INJECTION, SOLUTION INTRAMUSCULAR; INTRAVENOUS
Status: COMPLETED | OUTPATIENT
Start: 2023-04-11 | End: 2023-04-11

## 2023-04-11 RX ADMIN — MORPHINE SULFATE 4 MG: 4 INJECTION, SOLUTION INTRAMUSCULAR; INTRAVENOUS at 09:04

## 2023-04-11 RX ADMIN — SODIUM CHLORIDE 1000 ML: 0.9 INJECTION, SOLUTION INTRAVENOUS at 06:04

## 2023-04-11 RX ADMIN — SODIUM CHLORIDE 1000 ML: 0.9 INJECTION, SOLUTION INTRAVENOUS at 07:04

## 2023-04-11 RX ADMIN — FENTANYL CITRATE 50 MCG: 50 INJECTION INTRAMUSCULAR; INTRAVENOUS at 06:04

## 2023-04-11 RX ADMIN — Medication: at 06:04

## 2023-04-11 RX ADMIN — CEFAZOLIN SODIUM 2 G: 2 SOLUTION INTRAVENOUS at 07:04

## 2023-04-11 NOTE — FIRST PROVIDER EVALUATION
Medical screening examination initiated.  I have conducted a focused provider triage encounter, findings are as follows:    Brief history of present illness:  Pleasant 47-year-old female presents the ER for evaluation of syncope and collapse.  Patient reports that she was ambulating in her house when she had a syncopal event and fell forward onto her face.  There is no prodrome.  There was loss of consciousness.  EMS arrived and patient had another syncopal event.  Her blood pressure was in the 60s.  Improved with IV fluids.  Patient arrived in the ER, she had moderate facial trauma, as well as a complex lip laceration.    Vitals:    04/11/23 0508   BP: 111/72   BP Location: Left arm   Pulse: 68   Resp: 20   Temp: 97.6 °F (36.4 °C)   TempSrc: Oral   SpO2: 100%   Weight: 99.8 kg (220 lb)       Pertinent physical exam:  Moderate facial trauma noted, complex lip laceration noted    Brief workup plan:  Evaluate for acute traumatic injury with CT scans, evaluation of syncope and hypotension with blood work and IV fluids    EKG sinus rhythm 82 beats per minute, no STEMI    Preliminary workup initiated; this workup will be continued and followed by the physician or advanced practice provider that is assigned to the patient when roomed.

## 2023-04-11 NOTE — ED NOTES
The patient was transported to the ED from home. The patient had witnessed two syncopal episodes PTA. Facial trauma present with laceration to bottom lip. Unable to open mouth wide. Cervical collar was applied after arrive to the ED. AAOx4. GCS 15. Family at bedside.

## 2023-04-11 NOTE — ED PROVIDER NOTES
Encounter Date: 4/11/2023       History     Chief Complaint   Patient presents with    Loss of Consciousness     Patient was at home and had a syncopal episode and fall while walking in the house, witnessed by her  who stated she had a brief loss of consciousness and became alert. EMS arrived on scene and witnessed a second syncopal episode with no additional trauma. Patient fell face first to the floor during the initial fall and has obvious trauma to her nose. Patient takes blood pressure meds. Patient was alert and oriented upon arrival. Patient denies any symptoms or pain preceding the first syncopal ep     José Miguel Meng is a 47 y.o. female who  has a past medical history of Hypertension.    She presents the ER for evaluation of syncope and collapse.  Patient reports that she was ambulating in her house when she had a syncopal event and fell forward onto her face.  Causing her to her cut lip.  There is no prodrome.  There was loss of consciousness.  EMS arrived and patient had another syncopal event.  Her blood pressure was in the 60s.  Improved with IV fluids.  Patient arrived in the ER, she had moderate facial trauma, as well as a complex lip laceration.  Patient denies any chest pain shortness breath or abdominal pain.  She states that her physician recently increased her spironolactone couple of days ago.  No other concerns noted today.    The history is provided by the patient.   Review of patient's allergies indicates:  No Known Allergies  Past Medical History:   Diagnosis Date    Hypertension      Past Surgical History:   Procedure Laterality Date    BREAST SURGERY  02/15/2019    Reduction    HYSTERECTOMY      REDUCTION OF BOTH BREASTS Bilateral 02/15/2019    Procedure: MAMMOPLASTY, REDUCTION, BILATERAL;  Surgeon: Rajinder Aldrich MD;  Location: [facility] OR [address] [place] [address];  Service: Plastics;  Laterality: Bilateral;    TOTAL REDUCTION MAMMOPLASTY  02/14/2019    TUBAL LIGATION      TUBAL LIGATION        Family History   Problem Relation Age of Onset    Hypertension Father         Soniat    Stroke Father     Hypertension Mother         Soniat    Vision loss Maternal Grandmother      Social History     Tobacco Use    Smoking status: Never    Smokeless tobacco: Never   Substance Use Topics    Alcohol use: Yes     Alcohol/week: 5.0 standard drinks     Types: 5 Glasses of wine per week     Comment: socially    Drug use: No     Review of Systems   Constitutional:  Negative for chills and fever.   HENT:  Negative for sore throat.    Respiratory:  Negative for cough and shortness of breath.    Cardiovascular:  Negative for chest pain.   Gastrointestinal:  Negative for nausea and vomiting.   Genitourinary:  Negative for dysuria, frequency and urgency.   Musculoskeletal:  Negative for back pain, neck pain and neck stiffness.   Skin:  Negative for rash and wound.   Neurological:  Positive for syncope. Negative for weakness.   Hematological:  Does not bruise/bleed easily.   Psychiatric/Behavioral:  Negative for agitation, behavioral problems and confusion.      Physical Exam     Initial Vitals [04/11/23 0508]   BP Pulse Resp Temp SpO2   111/72 68 20 97.6 °F (36.4 °C) 100 %      MAP       --         Physical Exam    Nursing note and vitals reviewed.  Constitutional: She appears well-developed and well-nourished. She is not diaphoretic. No distress.   No apparent distress, C-collar in place no apparent distress.  C-collar in place.   HENT:   Head: Normocephalic and atraumatic.   Mouth/Throat: Oropharynx is clear and moist.   Swelling to her face and mandible.  2 cm complex lip laceration that violates the vermilion border  Lower central incisors appear chipped,between them there is an opening of the skin    Eyes: EOM are normal. Pupils are equal, round, and reactive to light.   Neck: No tracheal deviation present.   Cardiovascular:  Normal rate, regular rhythm, normal heart sounds and intact distal pulses.            Pulmonary/Chest: Breath sounds normal. No stridor. No respiratory distress. She has no wheezes.   Abdominal: Abdomen is soft. Bowel sounds are normal. She exhibits no distension and no mass. There is no abdominal tenderness.   Musculoskeletal:         General: No tenderness or edema. Normal range of motion.      Comments: No C/T/L spine ttp      Neurological: She is alert and oriented to person, place, and time. No cranial nerve deficit or sensory deficit.   Skin: Skin is warm and dry. Capillary refill takes less than 2 seconds. No rash noted.   Psychiatric: She has a normal mood and affect. Her behavior is normal. Thought content normal.       ED Course   Procedures  Labs Reviewed   CBC W/ AUTO DIFFERENTIAL - Abnormal; Notable for the following components:       Result Value    RBC 3.52 (*)     Hemoglobin 10.5 (*)     Hematocrit 31.4 (*)     Immature Granulocytes 0.6 (*)     All other components within normal limits   COMPREHENSIVE METABOLIC PANEL - Abnormal; Notable for the following components:    Sodium 132 (*)     CO2 16 (*)     Glucose 161 (*)     Calcium 8.6 (*)     All other components within normal limits   LACTIC ACID, PLASMA - Abnormal; Notable for the following components:    Lactate (Lactic Acid) 3.8 (*)     All other components within normal limits    Narrative:      LACT  critical result(s) called and verbal readback obtained from   Zara Rosenberg RN by RANDALL 04/11/2023 07:23   ALCOHOL,MEDICAL (ETHANOL) - Abnormal; Notable for the following components:    Alcohol, Serum 48 (*)     All other components within normal limits   TROPONIN I   B-TYPE NATRIURETIC PEPTIDE   D DIMER, QUANTITATIVE   DRUG SCREEN PANEL, URINE EMERGENCY          Imaging Results              CT Cervical Spine Without Contrast (Final result)  Result time 04/11/23 07:04:11      Final result by Dereck Staples MD (04/11/23 07:04:11)                   Impression:      No acute cervical fracture.    Moderate cervical  spondylosis.      Electronically signed by: Dereck Staples MD  Date:    04/11/2023  Time:    07:04               Narrative:    EXAMINATION:  CT CERVICAL SPINE WITHOUT CONTRAST    CLINICAL HISTORY:  Neck trauma, intoxicated or obtunded (Age >= 16y);    TECHNIQUE:  Low dose axial images, sagittal and coronal reformations were performed though the cervical spine.  Contrast was not administered.    COMPARISON:  None.    FINDINGS:    Straightening of the normal cervical lordosis.  Grossly normal sagittal alignment.  Vertebral body heights are well maintained.  Vacuum phenomena at C1-C2 and atlanto occipital region.  Moderate degenerative changes in the cervical spine with disc space narrowing and degenerative endplate changes most pronounced at C3-C4 and C5-C6. No severe central canal stenosis.  Variable neural foraminal narrowing, most pronounced on the right at C3-C4. No acute fracture identified.  Prevertebral soft tissues are normal.  Lung apices are clear.  1 cm hypodensity in the inferior aspect of the left thyroid lobe, not large enough to require dedicated ultrasound follow-up.                                        CT Maxillofacial Without Contrast (Final result)  Result time 04/11/23 06:48:13      Final result by Dereck Staples MD (04/11/23 06:48:13)                   Impression:      No CT evidence of acute intracranial abnormality.    Acute fractures of the anterior nasal spine of the maxilla and midline mandible with extension into the incisor teeth and associated dental fractures.  Lower lip soft tissue injury with multiple radiodensities in the lower lip soft tissues which may be related to fracture fragments versus foreign bodies or other calcifications.  Recommend correlation with dental evaluation when clinically appropriate.    Age-indeterminate minimal nasal bone deformity.    Additional findings discussed in the body of the report.    This report was flagged in Epic as  abnormal.      Electronically signed by: Dereck Staples MD  Date:    04/11/2023  Time:    06:48               Narrative:    EXAMINATION:  CT HEAD WITHOUT CONTRAST; CT MAXILLOFACIAL WITHOUT CONTRAST    CLINICAL HISTORY:  Head trauma, moderate-severe;; Facial trauma, blunt;    TECHNIQUE:  CT images of the head and maxillofacial bones without contrast.  Coronal and sagittal reconstructions were created for both acquisitions.    COMPARISON:  None.    FINDINGS:  CT head:    No evidence of acute territorial infarct, hemorrhage, mass effect, or midline shift.    The ventricles are normal in size and configuration.    No extra-axial hemorrhage or mass.    No displaced calvarial fracture.    CT maxillofacial:    Nondisplaced fracture along the inferior central mandible adjacent to the mandibular incisor teeth with associated periapical lucencies of the incisor teeth.  Additional minimally displaced fracture of the anterior nasal spine of the maxilla (series 3, image 266) with extension into the maxillary central incisor teeth.  There are multiple calcifications versus bone fragments or foreign bodies along the anterior aspect of the mandible in the lower lip including a 6 mm radiodensity adjacent to the central incisor teeth (sagittal series 602, image 94), likely a dental fracture.  Soft tissue injury of the lower lip with trace emphysema in this region.  Question age-indeterminate nasal bone fracture deformity.  No additional acute facial fracture.    Globes are symmetric.  Retrobulbar fat is preserved.    The paranasal sinuses and mastoid air cells are clear.                                        CT Head Without Contrast (Final result)  Result time 04/11/23 06:48:13      Final result by Dereck Staples MD (04/11/23 06:48:13)                   Impression:      No CT evidence of acute intracranial abnormality.    Acute fractures of the anterior nasal spine of the maxilla and midline mandible with extension into the  incisor teeth and associated dental fractures.  Lower lip soft tissue injury with multiple radiodensities in the lower lip soft tissues which may be related to fracture fragments versus foreign bodies or other calcifications.  Recommend correlation with dental evaluation when clinically appropriate.    Age-indeterminate minimal nasal bone deformity.    Additional findings discussed in the body of the report.    This report was flagged in Epic as abnormal.      Electronically signed by: Dereck Staples MD  Date:    04/11/2023  Time:    06:48               Narrative:    EXAMINATION:  CT HEAD WITHOUT CONTRAST; CT MAXILLOFACIAL WITHOUT CONTRAST    CLINICAL HISTORY:  Head trauma, moderate-severe;; Facial trauma, blunt;    TECHNIQUE:  CT images of the head and maxillofacial bones without contrast.  Coronal and sagittal reconstructions were created for both acquisitions.    COMPARISON:  None.    FINDINGS:  CT head:    No evidence of acute territorial infarct, hemorrhage, mass effect, or midline shift.    The ventricles are normal in size and configuration.    No extra-axial hemorrhage or mass.    No displaced calvarial fracture.    CT maxillofacial:    Nondisplaced fracture along the inferior central mandible adjacent to the mandibular incisor teeth with associated periapical lucencies of the incisor teeth.  Additional minimally displaced fracture of the anterior nasal spine of the maxilla (series 3, image 266) with extension into the maxillary central incisor teeth.  There are multiple calcifications versus bone fragments or foreign bodies along the anterior aspect of the mandible in the lower lip including a 6 mm radiodensity adjacent to the central incisor teeth (sagittal series 602, image 94), likely a dental fracture.  Soft tissue injury of the lower lip with trace emphysema in this region.  Question age-indeterminate nasal bone fracture deformity.  No additional acute facial fracture.    Globes are symmetric.   Retrobulbar fat is preserved.    The paranasal sinuses and mastoid air cells are clear.                                       Medications   ondansetron injection 4 mg (has no administration in time range)   sodium chloride 0.9% bolus 1,000 mL 1,000 mL (1,000 mLs Intravenous New Bag 4/11/23 0759)   sodium chloride 0.9% bolus 1,000 mL 1,000 mL (1,000 mLs Intravenous New Bag 4/11/23 0649)   LETS (LIDOcaine-TETRAcaine-EPINEPHrine) gel solution ( Topical (Top) Given 4/11/23 0650)   fentaNYL 50 mcg/mL injection 50 mcg (50 mcg Intravenous Given 4/11/23 0647)   cefazolin (ANCEF) 2 gram in dextrose 5% 50 mL IVPB (premix) (0 g Intravenous Stopped 4/11/23 0756)     Medical Decision Making:   Differential Diagnosis:   Differential Diagnosis includes, but is not limited to:  Arrhythmia, aortic dissection, MI/unstable angina, PE, cardiogenic shock, CHF, CVA/TIA, intracranial lesion/mass, seizure, perforated viscous, ruptured AAA, orthostatic hypotension, vasovagal episode, anemia, dehydration, medication reaction, intentional overdose    ED Management:  Picture with findings concerning for open mandible fracture in addition to nasal bone fractures and complex lip laceration.  Her labs were remarkable for lactic acidosis and alcohol intoxication.  Her bicarb was also decreased at 16.  She states her last tetanus shot was last year.  Transferred to [facility] for evaluation by OMFS.          Attending Attestation:         Attending Critical Care:   Critical Care Times:   ==============================================================  Total Critical Care Time - exclusive of procedural time: 35 minutes.  ==============================================================  Critical care was necessary to treat or prevent imminent or life-threatening deterioration of the following conditions: trauma.         ED Course as of 04/11/23 0814   Tue Apr 11, 2023 0639 ECG rate 82.  Normal sinus rhythm.  Nonspecific T-wave changes.   milliseconds.   No STEMI. [RN]   0708 CT Maxillofacial Without Contrast(!) [RN]   0708 CT Head Without Contrast(!) [RN]   0708 CT Cervical Spine Without Contrast [RN]   0745 D dimer, quantitative  No significant abnormality.    [RN]   0745 Brain natriuretic peptide  No significant abnormality.    [RN]   0745 Troponin I  No significant abnormality.    [RN]   0745 Lactic acid, plasma(!!)  Lactic acidosis ? 2/2 transient hypotension [RN]   0746 Ethanol(!)  Etoh + [RN]   0746 CBC auto differential(!)  Stable h/h from baseline [RN]   0753 Patient is accepted at [facility] by Dr. Lopez for mandible and nasal fractures and complex laceration.  [RN]      ED Course User Index  [RN] Ethan Douglass Jr., MD                   Clinical Impression:   Final diagnoses:  [R55] Syncope  [S02.609B] Open fracture of mandible, unspecified laterality, unspecified mandibular site, initial encounter (Primary)  [E87.20] Lactic acidosis        ED Disposition Condition    Transfer to Another Facility Stable          Portions of this note were dictated using voice recognition software and may contain dictation related errors in spelling/grammar/syntax not found on text review          Ethan Douglass Jr., MD  04/11/23 0869

## 2023-04-13 ENCOUNTER — PATIENT MESSAGE (OUTPATIENT)
Dept: INTERNAL MEDICINE | Facility: CLINIC | Age: 48
End: 2023-04-13
Payer: MEDICAID

## 2023-04-17 ENCOUNTER — PATIENT MESSAGE (OUTPATIENT)
Dept: ADMINISTRATIVE | Facility: OTHER | Age: 48
End: 2023-04-17
Payer: MEDICAID

## 2023-04-17 ENCOUNTER — OFFICE VISIT (OUTPATIENT)
Dept: FAMILY MEDICINE | Facility: CLINIC | Age: 48
End: 2023-04-17
Payer: MEDICAID

## 2023-04-17 VITALS
HEART RATE: 85 BPM | BODY MASS INDEX: 34.39 KG/M2 | HEIGHT: 67 IN | WEIGHT: 219.13 LBS | SYSTOLIC BLOOD PRESSURE: 143 MMHG | OXYGEN SATURATION: 95 % | DIASTOLIC BLOOD PRESSURE: 99 MMHG

## 2023-04-17 DIAGNOSIS — I10 ESSENTIAL HYPERTENSION: ICD-10-CM

## 2023-04-17 DIAGNOSIS — R55 SYNCOPE, UNSPECIFIED SYNCOPE TYPE: Primary | ICD-10-CM

## 2023-04-17 DIAGNOSIS — E87.1 HYPONATREMIA: ICD-10-CM

## 2023-04-17 DIAGNOSIS — R00.0 TACHYCARDIA: ICD-10-CM

## 2023-04-17 DIAGNOSIS — R94.31 PROLONGED QT INTERVAL: ICD-10-CM

## 2023-04-17 PROBLEM — E66.811 CLASS 1 OBESITY DUE TO EXCESS CALORIES WITH SERIOUS COMORBIDITY AND BODY MASS INDEX (BMI) OF 34.0 TO 34.9 IN ADULT: Status: ACTIVE | Noted: 2022-06-27

## 2023-04-17 PROBLEM — E66.09 CLASS 1 OBESITY DUE TO EXCESS CALORIES WITH SERIOUS COMORBIDITY AND BODY MASS INDEX (BMI) OF 34.0 TO 34.9 IN ADULT: Status: ACTIVE | Noted: 2022-06-27

## 2023-04-17 PROCEDURE — 93010 ELECTROCARDIOGRAM REPORT: CPT | Mod: S$PBB,,, | Performed by: INTERNAL MEDICINE

## 2023-04-17 PROCEDURE — 93010 EKG 12-LEAD: ICD-10-PCS | Mod: S$PBB,,, | Performed by: INTERNAL MEDICINE

## 2023-04-17 PROCEDURE — 93005 ELECTROCARDIOGRAM TRACING: CPT | Mod: PBBFAC,PO | Performed by: INTERNAL MEDICINE

## 2023-04-17 PROCEDURE — 99214 OFFICE O/P EST MOD 30 MIN: CPT | Mod: S$PBB,,, | Performed by: FAMILY MEDICINE

## 2023-04-17 PROCEDURE — 99214 PR OFFICE/OUTPT VISIT, EST, LEVL IV, 30-39 MIN: ICD-10-PCS | Mod: S$PBB,,, | Performed by: FAMILY MEDICINE

## 2023-04-17 PROCEDURE — 99999 PR PBB SHADOW E&M-EST. PATIENT-LVL IV: ICD-10-PCS | Mod: PBBFAC,,, | Performed by: FAMILY MEDICINE

## 2023-04-17 PROCEDURE — 99214 OFFICE O/P EST MOD 30 MIN: CPT | Mod: PBBFAC,PO | Performed by: FAMILY MEDICINE

## 2023-04-17 PROCEDURE — 99999 PR PBB SHADOW E&M-EST. PATIENT-LVL IV: CPT | Mod: PBBFAC,,, | Performed by: FAMILY MEDICINE

## 2023-04-17 NOTE — PROGRESS NOTES
"Subjective:       Patient ID: José Miguel Meng is a 47 y.o. female.    Chief Complaint: Follow-up    Patient Active Problem List   Diagnosis    Menometrorrhagia    Macromastia    Elevated LFTs    Normocytic anemia    Gout    Vitamin D deficiency    Essential hypertension    Class 1 obesity due to excess calories with serious comorbidity and body mass index (BMI) of 34.0 to 34.9 in adult    Prediabetes    Impaired mobility and activities of daily living    Decreased strength of lower extremity      Follow-up  46 yo female presents for follow up s/p ED visit on 04/11/2023.   Patient had a syncopal episode and fall while walking in the house witnessed by . Second brief LOC witnessed by EMS with no additional trauma.   Patient fell face first to the floor during the initial fall and has obvious trauma to her nose. Patient denies any symptoms or pain preceding the first syncopal episode.  4 days prior to syncopal episode, Spironolactone dose was increased. History of HTN.   Patient was titrating herself on Spironolactone from 50 mg to 75 mg to 100 mg. Did note normal BP on 75 mg but ran out of 25 mg tabs, started on 100 mg a few days prior to ED visit.   Evening of syncopal episode, woke up from sleeping on couch, was driving a normal amount of wine in evening. Did note dehydration.    Family history of seizure due to brain tumor. Patient had no observed seizure activity and no post ictal period per available information.    BP when paramedics arrived 80/60  [facility] ED /72    CT head, CT cervical spin and CT maxillary facial done.  "Acute fractures of the anterior nasal spine of the maxilla and midline mandible with extension into the incisor teeth and associated dental fractures.  Lower lip soft tissue injury with multiple radiodensities in the lower lip soft tissues which may be related to fracture fragments versus foreign bodies or other calcifications.  Recommend correlation with dental evaluation " "when clinically appropriate."    EKG -   Prolonged QT interval but unchanged from prior 2019 EKG with similar finding.    Transferred to [facility] for OMFS eval.   ED eval and teeth splinting done then discharge. Patient opted for no stitches for lip laceration - today, improving by remains swollen. Granulated base on laceration and healing without local infection.   Percocet 5/325 mg given for pain and completed.   Pain controlled on OTC Ibuprofen but still in pain.     Initial syncopal episode work up - imaging as above. Labs with mild hypo Na, improved from prior. Chronic anemia. Mild acidosis.   D-dimer, CXR, CTA PE unremarkable.    Since discharge, taking Losartan 100 mg and Spironolactone 100 mg daily and noting no hypotension or presyncopal symptoms.     Student - no need for work note at this time. Taking classes online.  Prior episode of syncope x 1 in past.     Review of Systems   All other systems reviewed and are negative.       Objective:     Vitals:    04/17/23 0902   BP: (!) 145/99   Pulse:         Physical Exam  Vitals and nursing note reviewed.   Constitutional:       General: She is not in acute distress.     Appearance: Normal appearance. She is not ill-appearing, toxic-appearing or diaphoretic.   HENT:      Head: Normocephalic and atraumatic.      Mouth/Throat:      Comments: Lower lip swollen with no warmth or drainage. Laceration down middle of lip with fibrinous exudate, appropriate to healing stage. No local signs of infection.   Eyes:      General: No scleral icterus.     Conjunctiva/sclera: Conjunctivae normal.   Cardiovascular:      Rate and Rhythm: Normal rate.      Heart sounds: Normal heart sounds.   Pulmonary:      Effort: Pulmonary effort is normal. No respiratory distress.   Skin:     Coloration: Skin is not pale.   Neurological:      Mental Status: She is alert. Mental status is at baseline.   Psychiatric:         Attention and Perception: Attention and perception normal.         Mood and " Affect: Mood and affect normal.         Speech: Speech normal.         Behavior: Behavior normal.         Cognition and Memory: Cognition and memory normal.         Judgment: Judgment normal.       Assessment:       1. Syncope, unspecified syncope type    2. Tachycardia    3. Prolonged QT interval    4. Hyponatremia    5. Essential hypertension        Plan:         Syncope, unspecified syncope type  Tachycardia  Prolonged QT interval  -     Echo Saline Bubble? Yes; Future  -     US Carotid Bilateral; Future; Expected date: 04/17/2023  -     Ambulatory referral/consult to Cardiology; Future; Expected date: 04/24/2023  - Tachycardia and prolonged QTc resolved on today's EKG. Complete Cardiology work up for syncope. Based on history, suspect compounding dehydration, medication dose increase and EtOH use as cause, but would rule out any underlying cardiac cause. Hospital records reviewed and summarized above. Low suspicion and utility for further neuro eval at this time. Continue to work on BP control.    Hyponatremia  -     Comprehensive Metabolic Panel; Future; Expected date: 04/17/2023  -     Osmolality, urine  -     Sodium, urine, random  -     Osmolality, Serum; Future; Expected date: 04/17/2023    Essential hypertension  -     Hypertension Digital Medicine (HDMP) Enrollment Order  -     Hypertension Digital Medicine (HDMP): Assign Onboarding Questionnaires  - Continue with Losartan and Spironolactone. Will recheck labs above. Keep BP log. Sign up for digital HTN.   Has close follow up with PCP.     Patient's questions answered. Plan reviewed with patient at the end of visit. Relevant precautions to chief complaint and reasons to seek medical care or contact the office sooner reviewed with patient.     Follow up in about 4 weeks (around 5/15/2023) for Hypertension Follow-up, w/ PCP - already scheduled.    Future Appointments   Date Time Provider Department Center   5/11/2023 11:00 AM Steve Skelton III, MD [facility] [place]  [place]     I spent a total of 46 minutes on the day of the visit.  This includes face to face time and non-face to face time preparing to see the patient (eg, review of tests), obtaining and/or reviewing separately obtained history, documenting clinical information in the electronic or other health record, independently interpreting results and communicating results to the patient/family/caregiver, or care coordinator.

## 2023-04-17 NOTE — Clinical Note
Patient seen for hospital follow up for syncope. See note. The cardiology referral may be overkill, but I wanted to see if they would do a [facility] would wrap up her syncope work up. She is seeing you in May to follow up her HTN.

## 2023-04-25 ENCOUNTER — HOSPITAL ENCOUNTER (OUTPATIENT)
Dept: RADIOLOGY | Facility: HOSPITAL | Age: 48
Discharge: HOME | End: 2023-04-25
Attending: FAMILY MEDICINE
Payer: MEDICAID

## 2023-04-25 ENCOUNTER — HOSPITAL ENCOUNTER (OUTPATIENT)
Dept: CARDIOLOGY | Facility: HOSPITAL | Age: 48
Discharge: HOME | End: 2023-04-25
Attending: FAMILY MEDICINE
Payer: MEDICAID

## 2023-04-25 VITALS — WEIGHT: 219 LBS | HEIGHT: 69 IN | BODY MASS INDEX: 32.44 KG/M2

## 2023-04-25 DIAGNOSIS — R55 SYNCOPE, UNSPECIFIED SYNCOPE TYPE: ICD-10-CM

## 2023-04-25 LAB
AV INDEX (PROSTH): 0.7
AV MEAN GRADIENT: 4 MMHG
AV PEAK GRADIENT: 7 MMHG
AV VALVE AREA: 2.24 CM2
AV VELOCITY RATIO: 0.73
BSA FOR ECHO PROCEDURE: 2.2 M2
CV ECHO LV RWT: 0.44 CM
DOP CALC AO PEAK VEL: 1.34 M/S
DOP CALC AO VTI: 20.7 CM
DOP CALC LVOT AREA: 3.2 CM2
DOP CALC LVOT DIAMETER: 2.02 CM
DOP CALC LVOT PEAK VEL: 0.98 M/S
DOP CALC LVOT STROKE VOLUME: 46.45 CM3
DOP CALC MV VTI: 20.3 CM
DOP CALCLVOT PEAK VEL VTI: 14.5 CM
E WAVE DECELERATION TIME: 165.81 MSEC
E/A RATIO: 0.83
E/E' RATIO: 10.62 M/S
ECHO LV POSTERIOR WALL: 1.09 CM (ref 0.6–1.1)
EJECTION FRACTION: 60 %
FRACTIONAL SHORTENING: 41 % (ref 28–44)
INTERVENTRICULAR SEPTUM: 1.1 CM (ref 0.6–1.1)
IVC DIAMETER: 1.66 CM
IVRT: 119.89 MSEC
LA MAJOR: 5.01 CM
LA MINOR: 4.16 CM
LA WIDTH: 3.6 CM
LEFT ATRIUM SIZE: 3.82 CM
LEFT ATRIUM VOLUME INDEX MOD: 17.7 ML/M2
LEFT ATRIUM VOLUME INDEX: 24.7 ML/M2
LEFT ATRIUM VOLUME MOD: 38.09 CM3
LEFT ATRIUM VOLUME: 53.13 CM3
LEFT INTERNAL DIMENSION IN SYSTOLE: 2.92 CM (ref 2.1–4)
LEFT VENTRICLE DIASTOLIC VOLUME INDEX: 22.84 ML/M2
LEFT VENTRICLE DIASTOLIC VOLUME: 49.11 ML
LEFT VENTRICLE MASS INDEX: 93 G/M2
LEFT VENTRICLE SYSTOLIC VOLUME INDEX: 7.9 ML/M2
LEFT VENTRICLE SYSTOLIC VOLUME: 17.02 ML
LEFT VENTRICULAR INTERNAL DIMENSION IN DIASTOLE: 4.91 CM (ref 3.5–6)
LEFT VENTRICULAR MASS: 199.9 G
LV LATERAL E/E' RATIO: 11.5 M/S
LV SEPTAL E/E' RATIO: 9.86 M/S
LVOT MG: 1.77 MMHG
LVOT MV: 0.62 CM/S
MV MEAN GRADIENT: 2 MMHG
MV PEAK A VEL: 0.83 M/S
MV PEAK E VEL: 0.69 M/S
MV PEAK GRADIENT: 6 MMHG
MV STENOSIS PRESSURE HALF TIME: 54.83 MS
MV VALVE AREA BY CONTINUITY EQUATION: 2.29 CM2
MV VALVE AREA P 1/2 METHOD: 4.01 CM2
OHS LV EJECTION FRACTION SIMPSONS BIPLANE MOD: 7 %
PISA MRMAX VEL: 5.53 M/S
PISA TR MAX VEL: 2.73 M/S
RA MAJOR: 4.51 CM
RA PRESSURE: 3 MMHG
RIGHT VENTRICULAR END-DIASTOLIC DIMENSION: 3.22 CM
RV TISSUE DOPPLER FREE WALL SYSTOLIC VELOCITY 1 (APICAL 4 CHAMBER VIEW): 0.01 CM/S
TDI LATERAL: 0.06 M/S
TDI SEPTAL: 0.07 M/S
TDI: 0.07 M/S
TR MAX PG: 30 MMHG
TV REST PULMONARY ARTERY PRESSURE: 33 MMHG

## 2023-04-25 PROCEDURE — 93306 TTE W/DOPPLER COMPLETE: CPT | Mod: 26,,, | Performed by: INTERNAL MEDICINE

## 2023-04-25 PROCEDURE — 93880 EXTRACRANIAL BILAT STUDY: CPT | Mod: 26,,, | Performed by: INTERNAL MEDICINE

## 2023-04-25 PROCEDURE — 93880 EXTRACRANIAL BILAT STUDY: CPT | Mod: TC

## 2023-04-25 PROCEDURE — 93306 ECHO (CUPID ONLY): ICD-10-PCS | Mod: 26,,, | Performed by: INTERNAL MEDICINE

## 2023-04-25 PROCEDURE — C8929 TTE W OR WO FOL WCON,DOPPLER: HCPCS

## 2023-04-25 PROCEDURE — 93880 US CAROTID BILATERAL: ICD-10-PCS | Mod: 26,,, | Performed by: INTERNAL MEDICINE

## 2023-05-08 ENCOUNTER — LAB VISIT (OUTPATIENT)
Dept: LAB | Facility: HOSPITAL | Age: 48
End: 2023-05-08
Attending: INTERNAL MEDICINE
Payer: MEDICAID

## 2023-05-08 DIAGNOSIS — D64.9 NORMOCHROMIC ANEMIA: ICD-10-CM

## 2023-05-08 DIAGNOSIS — I10 ESSENTIAL HYPERTENSION: ICD-10-CM

## 2023-05-08 DIAGNOSIS — R73.03 PREDIABETES: ICD-10-CM

## 2023-05-08 DIAGNOSIS — M10.9 GOUT, UNSPECIFIED CAUSE, UNSPECIFIED CHRONICITY, UNSPECIFIED SITE: ICD-10-CM

## 2023-05-08 LAB
ALBUMIN SERPL BCP-MCNC: 3.4 G/DL (ref 3.5–5.2)
ALP SERPL-CCNC: 80 U/L (ref 55–135)
ALT SERPL W/O P-5'-P-CCNC: 22 U/L (ref 10–44)
ANION GAP SERPL CALC-SCNC: 10 MMOL/L (ref 8–16)
AST SERPL-CCNC: 26 U/L (ref 10–40)
BILIRUB DIRECT SERPL-MCNC: 0.2 MG/DL (ref 0.1–0.3)
BILIRUB SERPL-MCNC: 0.3 MG/DL (ref 0.1–1)
BUN SERPL-MCNC: 7 MG/DL (ref 6–20)
CALCIUM SERPL-MCNC: 9.2 MG/DL (ref 8.7–10.5)
CHLORIDE SERPL-SCNC: 100 MMOL/L (ref 95–110)
CHOLEST SERPL-MCNC: 191 MG/DL (ref 120–199)
CHOLEST/HDLC SERPL: 3.5 {RATIO} (ref 2–5)
CO2 SERPL-SCNC: 22 MMOL/L (ref 23–29)
CREAT SERPL-MCNC: 1 MG/DL (ref 0.5–1.4)
ERYTHROCYTE [DISTWIDTH] IN BLOOD BY AUTOMATED COUNT: 13.9 % (ref 11.5–14.5)
EST. GFR  (NO RACE VARIABLE): >60 ML/MIN/1.73 M^2
ESTIMATED AVG GLUCOSE: 108 MG/DL (ref 68–131)
GLUCOSE SERPL-MCNC: 94 MG/DL (ref 70–110)
HBA1C MFR BLD: 5.4 % (ref 4–5.6)
HCT VFR BLD AUTO: 36.6 % (ref 37–48.5)
HDLC SERPL-MCNC: 55 MG/DL (ref 40–75)
HDLC SERPL: 28.8 % (ref 20–50)
HGB BLD-MCNC: 11.4 G/DL (ref 12–16)
LDLC SERPL CALC-MCNC: 108.8 MG/DL (ref 63–159)
MCH RBC QN AUTO: 29.5 PG (ref 27–31)
MCHC RBC AUTO-ENTMCNC: 31.1 G/DL (ref 32–36)
MCV RBC AUTO: 95 FL (ref 82–98)
NONHDLC SERPL-MCNC: 136 MG/DL
PLATELET # BLD AUTO: 360 K/UL (ref 150–450)
PMV BLD AUTO: 11 FL (ref 9.2–12.9)
POTASSIUM SERPL-SCNC: 4.2 MMOL/L (ref 3.5–5.1)
PROT SERPL-MCNC: 7.3 G/DL (ref 6–8.4)
RBC # BLD AUTO: 3.87 M/UL (ref 4–5.4)
SODIUM SERPL-SCNC: 132 MMOL/L (ref 136–145)
TRIGL SERPL-MCNC: 136 MG/DL (ref 30–150)
URATE SERPL-MCNC: 1.9 MG/DL (ref 2.4–5.7)
WBC # BLD AUTO: 5.72 K/UL (ref 3.9–12.7)

## 2023-05-08 PROCEDURE — 80061 LIPID PANEL: CPT | Performed by: INTERNAL MEDICINE

## 2023-05-08 PROCEDURE — 83036 HEMOGLOBIN GLYCOSYLATED A1C: CPT | Performed by: INTERNAL MEDICINE

## 2023-05-08 PROCEDURE — 80076 HEPATIC FUNCTION PANEL: CPT | Performed by: INTERNAL MEDICINE

## 2023-05-08 PROCEDURE — 85027 COMPLETE CBC AUTOMATED: CPT | Performed by: INTERNAL MEDICINE

## 2023-05-08 PROCEDURE — 80048 BASIC METABOLIC PNL TOTAL CA: CPT | Performed by: INTERNAL MEDICINE

## 2023-05-08 PROCEDURE — 84550 ASSAY OF BLOOD/URIC ACID: CPT | Performed by: INTERNAL MEDICINE

## 2023-05-08 PROCEDURE — 36415 COLL VENOUS BLD VENIPUNCTURE: CPT | Mod: PO | Performed by: INTERNAL MEDICINE

## 2023-05-10 ENCOUNTER — OFFICE VISIT (OUTPATIENT)
Dept: RHEUMATOLOGY | Facility: CLINIC | Age: 48
End: 2023-05-10
Payer: MEDICAID

## 2023-05-10 ENCOUNTER — HOSPITAL ENCOUNTER (OUTPATIENT)
Dept: RADIOLOGY | Facility: HOSPITAL | Age: 48
Discharge: HOME | End: 2023-05-10
Attending: INTERNAL MEDICINE
Payer: MEDICAID

## 2023-05-10 VITALS
WEIGHT: 215.31 LBS | BODY MASS INDEX: 31.89 KG/M2 | HEART RATE: 87 BPM | SYSTOLIC BLOOD PRESSURE: 135 MMHG | DIASTOLIC BLOOD PRESSURE: 93 MMHG | OXYGEN SATURATION: 99 % | HEIGHT: 69 IN

## 2023-05-10 DIAGNOSIS — Z79.899 ENCOUNTER FOR LONG-TERM (CURRENT) USE OF OTHER MEDICATIONS: ICD-10-CM

## 2023-05-10 DIAGNOSIS — E66.9 CLASS 1 OBESITY WITH BODY MASS INDEX (BMI) OF 31.0 TO 31.9 IN ADULT, UNSPECIFIED OBESITY TYPE, UNSPECIFIED WHETHER SERIOUS COMORBIDITY PRESENT: ICD-10-CM

## 2023-05-10 DIAGNOSIS — M10.9 GOUT, UNSPECIFIED CAUSE, UNSPECIFIED CHRONICITY, UNSPECIFIED SITE: ICD-10-CM

## 2023-05-10 DIAGNOSIS — M10.9 GOUT, UNSPECIFIED CAUSE, UNSPECIFIED CHRONICITY, UNSPECIFIED SITE: Primary | ICD-10-CM

## 2023-05-10 DIAGNOSIS — Z71.89 COUNSELING AND COORDINATION OF CARE: ICD-10-CM

## 2023-05-10 PROCEDURE — 99205 PR OFFICE/OUTPT VISIT, NEW, LEVL V, 60-74 MIN: ICD-10-PCS | Mod: S$PBB,,, | Performed by: INTERNAL MEDICINE

## 2023-05-10 PROCEDURE — 73110 X-RAY EXAM OF WRIST: CPT | Mod: 26,RT,, | Performed by: RADIOLOGY

## 2023-05-10 PROCEDURE — 99999 PR PBB SHADOW E&M-EST. PATIENT-LVL V: CPT | Mod: PBBFAC,,, | Performed by: INTERNAL MEDICINE

## 2023-05-10 PROCEDURE — 4010F PR ACE/ARB THEARPY RXD/TAKEN: ICD-10-PCS | Mod: CPTII,,, | Performed by: INTERNAL MEDICINE

## 2023-05-10 PROCEDURE — 73070 XR ELBOW 2 VIEWS LEFT: ICD-10-PCS | Mod: 26,LT,, | Performed by: RADIOLOGY

## 2023-05-10 PROCEDURE — 73070 X-RAY EXAM OF ELBOW: CPT | Mod: 26,LT,, | Performed by: RADIOLOGY

## 2023-05-10 PROCEDURE — 73070 X-RAY EXAM OF ELBOW: CPT | Mod: TC,FY,PO,LT

## 2023-05-10 PROCEDURE — 3080F DIAST BP >= 90 MM HG: CPT | Mod: CPTII,,, | Performed by: INTERNAL MEDICINE

## 2023-05-10 PROCEDURE — 3075F PR MOST RECENT SYSTOLIC BLOOD PRESS GE 130-139MM HG: ICD-10-PCS | Mod: CPTII,,, | Performed by: INTERNAL MEDICINE

## 2023-05-10 PROCEDURE — 3044F PR MOST RECENT HEMOGLOBIN A1C LEVEL <7.0%: ICD-10-PCS | Mod: CPTII,,, | Performed by: INTERNAL MEDICINE

## 2023-05-10 PROCEDURE — 4010F ACE/ARB THERAPY RXD/TAKEN: CPT | Mod: CPTII,,, | Performed by: INTERNAL MEDICINE

## 2023-05-10 PROCEDURE — 1159F MED LIST DOCD IN RCRD: CPT | Mod: CPTII,,, | Performed by: INTERNAL MEDICINE

## 2023-05-10 PROCEDURE — 3044F HG A1C LEVEL LT 7.0%: CPT | Mod: CPTII,,, | Performed by: INTERNAL MEDICINE

## 2023-05-10 PROCEDURE — 99215 OFFICE O/P EST HI 40 MIN: CPT | Mod: PBBFAC,PO | Performed by: INTERNAL MEDICINE

## 2023-05-10 PROCEDURE — 99205 OFFICE O/P NEW HI 60 MIN: CPT | Mod: S$PBB,,, | Performed by: INTERNAL MEDICINE

## 2023-05-10 PROCEDURE — 73110 XR WRIST COMPLETE 3 VIEWS RIGHT: ICD-10-PCS | Mod: 26,RT,, | Performed by: RADIOLOGY

## 2023-05-10 PROCEDURE — 3075F SYST BP GE 130 - 139MM HG: CPT | Mod: CPTII,,, | Performed by: INTERNAL MEDICINE

## 2023-05-10 PROCEDURE — 3008F BODY MASS INDEX DOCD: CPT | Mod: CPTII,,, | Performed by: INTERNAL MEDICINE

## 2023-05-10 PROCEDURE — 3080F PR MOST RECENT DIASTOLIC BLOOD PRESSURE >= 90 MM HG: ICD-10-PCS | Mod: CPTII,,, | Performed by: INTERNAL MEDICINE

## 2023-05-10 PROCEDURE — 73110 X-RAY EXAM OF WRIST: CPT | Mod: TC,FY,PO,RT

## 2023-05-10 PROCEDURE — 1159F PR MEDICATION LIST DOCUMENTED IN MEDICAL RECORD: ICD-10-PCS | Mod: CPTII,,, | Performed by: INTERNAL MEDICINE

## 2023-05-10 PROCEDURE — 99999 PR PBB SHADOW E&M-EST. PATIENT-LVL V: ICD-10-PCS | Mod: PBBFAC,,, | Performed by: INTERNAL MEDICINE

## 2023-05-10 PROCEDURE — 3008F PR BODY MASS INDEX (BMI) DOCUMENTED: ICD-10-PCS | Mod: CPTII,,, | Performed by: INTERNAL MEDICINE

## 2023-05-10 RX ORDER — CHLORHEXIDINE GLUCONATE ORAL RINSE 1.2 MG/ML
SOLUTION DENTAL
COMMUNITY
Start: 2023-05-04

## 2023-05-10 NOTE — PROGRESS NOTES
RHEUMATOLOGY OUTPATIENT CLINIC NOTE    5/10/2023    Attending Rheumatologist: Tony Rodrigues  Primary Care Provider: Roman Skelton III, MD   Physician Requesting Consultation: Steve Skelton III, MD  [address] [facility] [place]  [place] [address]  Chief Complaint/Reason For Consultation:  Joint Pain      Subjective:       HPI  Treniece Nicole Meng is a 47 y.o. Black or  female with medical history noted below who presents for Gout/Pseudogout.     Patient presents for evaluation of Gout/Pseudogout. She notes dating back about 4 years her first attack left foot. Endorses prior left foot fracture. But then developed right foot swelling, notes had her BP med switched. She notes now that if she eats seafood, she will feel the next day swelling and stiffness in her joints, take a colchicine and Indocin with relief. Triggers: Organ meat, seafood. FHX-Mom/Uncles with Gout. No Hx of Kidney stones. Currently on Allopurinol 300mg, despite this having flares. Last attack about 2-3 months.     Review of Systems   Constitutional:  Negative for chills, fatigue, fever and unexpected weight change.   HENT:  Negative for mouth sores.    Eyes:  Negative for redness and eye dryness.   Respiratory:  Negative for cough and shortness of breath.    Cardiovascular:  Negative for chest pain.   Gastrointestinal:  Negative for abdominal distention, constipation, diarrhea, nausea and vomiting.   Genitourinary:  Negative for vaginal dryness.   Musculoskeletal:  Positive for arthralgias. Negative for back pain, gait problem, joint swelling, leg pain, myalgias, neck pain, neck stiffness and joint deformity.   Integumentary:  Negative for rash.   Neurological:  Negative for weakness, numbness and headaches.   Hematological:  Negative for adenopathy. Does not bruise/bleed easily.   Psychiatric/Behavioral:  Negative for confusion, decreased concentration and sleep disturbance. The patient is not nervous/anxious.    All other systems  reviewed and are negative.     Chronic comorbid conditions affecting medical decision making today:  Past Medical History:   Diagnosis Date    Hypertension      Past Surgical History:   Procedure Laterality Date    BREAST SURGERY  02/15/2019    Reduction    HYSTERECTOMY      REDUCTION OF BOTH BREASTS Bilateral 02/15/2019    Procedure: MAMMOPLASTY, REDUCTION, BILATERAL;  Surgeon: Rajinder Aldrich MD;  Location: [facility] OR [address] [place] [address];  Service: Plastics;  Laterality: Bilateral;    TOTAL REDUCTION MAMMOPLASTY  02/14/2019    TUBAL LIGATION      TUBAL LIGATION       Family History   Problem Relation Age of Onset    Hypertension Father         Soniat    Stroke Father     Hypertension Mother         Soniat    Vision loss Maternal Grandmother      Social History     Substance and Sexual Activity   Alcohol Use Yes    Alcohol/week: 5.0 standard drinks    Types: 5 Glasses of wine per week    Comment: socially     Social History     Tobacco Use   Smoking Status Never   Smokeless Tobacco Never     Social History     Substance and Sexual Activity   Drug Use No       Current Outpatient Medications:     allopurinoL (ZYLOPRIM) 300 MG tablet, Take 1 tablet (300 mg total) by mouth once daily., Disp: 90 tablet, Rfl: 1    colchicine (COLCRYS) 0.6 mg tablet, Take 1 tablet (0.6 mg total) by mouth 2 (two) times daily., Disp: 30 tablet, Rfl: 0    indomethacin (INDOCIN) 50 MG capsule, Take 1 capsule (50 mg total) by mouth 2 (two) times daily with meals., Disp: 60 capsule, Rfl: 2    losartan (COZAAR) 100 MG tablet, Take 1 tablet (100 mg total) by mouth once daily., Disp: 90 tablet, Rfl: 1    pantoprazole (PROTONIX) 40 MG tablet, Take 1 tablet (40 mg total) by mouth every morning., Disp: 90 tablet, Rfl: 1    spironolactone (ALDACTONE) 100 MG tablet, Take 1 tablet (100 mg total) by mouth once daily., Disp: 90 tablet, Rfl: 1    chlorhexidine (PERIDEX) 0.12 % solution, , Disp: , Rfl:      Objective:         Vitals:    05/10/23 0935   BP: (!) 135/93    Pulse: 87     Physical Exam  Obese   Can make fist, no synovitis or TTP  Wrist, Elbow, Shoulder ROM ok, no TTP  Hip ROM ok  Knee Crepitus  Negative ankle/MTP   No tender points     Reviewed old and all outside pertinent medical records available.    All lab results personally reviewed and interpreted by me.  Lab Results   Component Value Date    WBC 5.72 05/08/2023    HGB 11.4 (L) 05/08/2023    HCT 36.6 (L) 05/08/2023    MCV 95 05/08/2023    MCH 29.5 05/08/2023    MCHC 31.1 (L) 05/08/2023    RDW 13.9 05/08/2023     05/08/2023    MPV 11.0 05/08/2023       Lab Results   Component Value Date     (L) 05/08/2023    K 4.2 05/08/2023     05/08/2023    CO2 22 (L) 05/08/2023    GLU 94 05/08/2023    BUN 7 05/08/2023    CALCIUM 9.2 05/08/2023    PROT 7.3 05/08/2023    ALBUMIN 3.4 (L) 05/08/2023    BILITOT 0.3 05/08/2023    AST 26 05/08/2023    ALKPHOS 80 05/08/2023    ALT 22 05/08/2023       Lab Results   Component Value Date    COLORU Straw 06/27/2022    APPEARANCEUA Clear 06/27/2022    SPECGRAV 1.005 06/27/2022    PHUR 5.0 06/27/2022    PROTEINUA Negative 06/27/2022    KETONESU Negative 06/27/2022    LEUKOCYTESUR Negative 06/27/2022    NITRITE Negative 06/27/2022    UROBILINOGEN Negative 04/06/2016       No results found for: CRP    No results found for: SEDRATE, ERYTHROCYTES    No results found for: KIRA, RF, SEDRATE    No components found for: 25OHVITDTOT, 88FSGTBF5, 20ATTLPW1, METHODNOTE    Lab Results   Component Value Date    URICACID 1.9 (L) 05/08/2023       No components found for: TSPOTTB        Imaging:  All imaging reviewed and independently interpreted by me.         ASSESSMENT / PLAN:     José Miguel Meng is a 47 y.o. Black or  female with:      1. Gout, unspecified cause, unspecified chronicity, unspecified site  - patients does describe typical gout triggers and response to meds  - UA at goal  - would continue Allopurinol 300mg daily, Colchicine/Indocin PPX  - gout diet  discussed  - update films  - reassurance   - Ambulatory referral/consult to Rheumatology  - X-Ray Elbow 2 Views Left; Future  - X-Ray Wrist Complete Right; Future    2. Encounter for long-term (current) use of other medications  - monitor labs    3. Other specified counseling  - over 10 minutes spent regarding below topics:  - Immunization counseling done.  - Weight loss counseling done.  - Nutrition and exercise counseling.  - Limitation of alcohol consumption.  - Regular exercise:  Aerobic and resistance.  - Medication counseling provided.    4. Obesity  - would benefit from decreasing at least 10% of body weight.  - recommended goal of losing 1 lb per week.  - consider nutritionist evaluation.  - would consider screening for SHABANA per PMD.    Follow up in about 6 months (around 11/10/2023).    Method of contact with patient concerns: [facility]    Disclaimer:  This note is prepared using voice recognition software and as such is likely to have errors and has not been proof read. Please contact me for questions.     Time spent: 60 minutes in face to face discussion concerning diagnosis, prognosis, review of lab and test results, benefits of treatment as well as management of disease, counseling of patient and coordination of care between various health care providers.  Greater than half the time spent was used for coordination of care and counseling of patient.    Tony Rodrigues M.D.  Rheumatology Department   [facility]

## 2023-05-11 ENCOUNTER — OFFICE VISIT (OUTPATIENT)
Dept: INTERNAL MEDICINE | Facility: CLINIC | Age: 48
End: 2023-05-11
Payer: MEDICAID

## 2023-05-11 VITALS
WEIGHT: 216.5 LBS | OXYGEN SATURATION: 99 % | SYSTOLIC BLOOD PRESSURE: 110 MMHG | HEIGHT: 69 IN | HEART RATE: 103 BPM | BODY MASS INDEX: 32.07 KG/M2 | DIASTOLIC BLOOD PRESSURE: 90 MMHG

## 2023-05-11 DIAGNOSIS — E87.1 HYPONATREMIA: ICD-10-CM

## 2023-05-11 DIAGNOSIS — R00.0 TACHYCARDIA: ICD-10-CM

## 2023-05-11 DIAGNOSIS — M10.9 GOUT, UNSPECIFIED CAUSE, UNSPECIFIED CHRONICITY, UNSPECIFIED SITE: ICD-10-CM

## 2023-05-11 DIAGNOSIS — R55 SYNCOPE, UNSPECIFIED SYNCOPE TYPE: ICD-10-CM

## 2023-05-11 DIAGNOSIS — Z09 HOSPITAL DISCHARGE FOLLOW-UP: ICD-10-CM

## 2023-05-11 DIAGNOSIS — K21.9 GASTROESOPHAGEAL REFLUX DISEASE, UNSPECIFIED WHETHER ESOPHAGITIS PRESENT: ICD-10-CM

## 2023-05-11 DIAGNOSIS — I10 ESSENTIAL HYPERTENSION: Primary | ICD-10-CM

## 2023-05-11 PROCEDURE — 99214 PR OFFICE/OUTPT VISIT, EST, LEVL IV, 30-39 MIN: ICD-10-PCS | Mod: S$PBB,,, | Performed by: INTERNAL MEDICINE

## 2023-05-11 PROCEDURE — 3008F PR BODY MASS INDEX (BMI) DOCUMENTED: ICD-10-PCS | Mod: CPTII,,, | Performed by: INTERNAL MEDICINE

## 2023-05-11 PROCEDURE — 1160F PR REVIEW ALL MEDS BY PRESCRIBER/CLIN PHARMACIST DOCUMENTED: ICD-10-PCS | Mod: CPTII,,, | Performed by: INTERNAL MEDICINE

## 2023-05-11 PROCEDURE — 99214 OFFICE O/P EST MOD 30 MIN: CPT | Mod: PBBFAC,PO | Performed by: INTERNAL MEDICINE

## 2023-05-11 PROCEDURE — 99999 PR PBB SHADOW E&M-EST. PATIENT-LVL IV: CPT | Mod: PBBFAC,,, | Performed by: INTERNAL MEDICINE

## 2023-05-11 PROCEDURE — 4010F ACE/ARB THERAPY RXD/TAKEN: CPT | Mod: CPTII,,, | Performed by: INTERNAL MEDICINE

## 2023-05-11 PROCEDURE — 3008F BODY MASS INDEX DOCD: CPT | Mod: CPTII,,, | Performed by: INTERNAL MEDICINE

## 2023-05-11 PROCEDURE — 99999 PR PBB SHADOW E&M-EST. PATIENT-LVL IV: ICD-10-PCS | Mod: PBBFAC,,, | Performed by: INTERNAL MEDICINE

## 2023-05-11 PROCEDURE — 1160F RVW MEDS BY RX/DR IN RCRD: CPT | Mod: CPTII,,, | Performed by: INTERNAL MEDICINE

## 2023-05-11 PROCEDURE — 4010F PR ACE/ARB THEARPY RXD/TAKEN: ICD-10-PCS | Mod: CPTII,,, | Performed by: INTERNAL MEDICINE

## 2023-05-11 PROCEDURE — 3044F HG A1C LEVEL LT 7.0%: CPT | Mod: CPTII,,, | Performed by: INTERNAL MEDICINE

## 2023-05-11 PROCEDURE — 3074F SYST BP LT 130 MM HG: CPT | Mod: CPTII,,, | Performed by: INTERNAL MEDICINE

## 2023-05-11 PROCEDURE — 3044F PR MOST RECENT HEMOGLOBIN A1C LEVEL <7.0%: ICD-10-PCS | Mod: CPTII,,, | Performed by: INTERNAL MEDICINE

## 2023-05-11 PROCEDURE — 3074F PR MOST RECENT SYSTOLIC BLOOD PRESSURE < 130 MM HG: ICD-10-PCS | Mod: CPTII,,, | Performed by: INTERNAL MEDICINE

## 2023-05-11 PROCEDURE — 3080F DIAST BP >= 90 MM HG: CPT | Mod: CPTII,,, | Performed by: INTERNAL MEDICINE

## 2023-05-11 PROCEDURE — 99214 OFFICE O/P EST MOD 30 MIN: CPT | Mod: S$PBB,,, | Performed by: INTERNAL MEDICINE

## 2023-05-11 PROCEDURE — 1159F MED LIST DOCD IN RCRD: CPT | Mod: CPTII,,, | Performed by: INTERNAL MEDICINE

## 2023-05-11 PROCEDURE — 1159F PR MEDICATION LIST DOCUMENTED IN MEDICAL RECORD: ICD-10-PCS | Mod: CPTII,,, | Performed by: INTERNAL MEDICINE

## 2023-05-11 PROCEDURE — 3080F PR MOST RECENT DIASTOLIC BLOOD PRESSURE >= 90 MM HG: ICD-10-PCS | Mod: CPTII,,, | Performed by: INTERNAL MEDICINE

## 2023-05-11 RX ORDER — LOSARTAN POTASSIUM 100 MG/1
100 TABLET ORAL DAILY
Qty: 90 TABLET | Refills: 1 | Status: SHIPPED | OUTPATIENT
Start: 2023-05-11 | End: 2023-08-31 | Stop reason: SDUPTHER

## 2023-05-11 RX ORDER — SPIRONOLACTONE 100 MG/1
100 TABLET, FILM COATED ORAL DAILY
Qty: 90 TABLET | Refills: 1 | Status: SHIPPED | OUTPATIENT
Start: 2023-05-11 | End: 2023-08-28

## 2023-05-11 RX ORDER — PANTOPRAZOLE SODIUM 40 MG/1
40 TABLET, DELAYED RELEASE ORAL EVERY MORNING
Qty: 90 TABLET | Refills: 1 | Status: SHIPPED | OUTPATIENT
Start: 2023-05-11 | End: 2023-08-31 | Stop reason: SDUPTHER

## 2023-05-11 RX ORDER — COLCHICINE 0.6 MG/1
0.6 TABLET ORAL 2 TIMES DAILY
Qty: 30 TABLET | Refills: 1 | Status: SHIPPED | OUTPATIENT
Start: 2023-05-11

## 2023-05-11 RX ORDER — ALLOPURINOL 300 MG/1
300 TABLET ORAL DAILY
Qty: 90 TABLET | Refills: 1 | Status: SHIPPED | OUTPATIENT
Start: 2023-05-11 | End: 2023-08-31 | Stop reason: SDUPTHER

## 2023-05-11 NOTE — PATIENT INSTRUCTIONS
Lets get a holter monitor to look at the pulse rate and the electrical activity of the heart   We refilled the medication  We should try the amlodipine for the blood pressure take one tablet once daily  Lets repeat your sodium labs in 2 weeks and follow up the blood pressure

## 2023-05-11 NOTE — PROGRESS NOTES
Assessment:       1. Hospital discharge follow-up    2. Gout, unspecified cause, unspecified chronicity, unspecified site    3. Essential hypertension    4. Gastroesophageal reflux disease, unspecified whether esophagitis present    5. Syncope, unspecified syncope type    6. Hyponatremia        Plan:         José Miguel was seen today for follow-up.    Diagnoses and all orders for this visit:    Essential hypertension  Chronic  Uncontrolled  Patient is not at goal today  I have reviewed lifestyle modification to achieve/maintain goals  We will adjust the current medication regimen to   Patient will follow up in 2 weeks  -     losartan (COZAAR) 100 MG tablet; Take 1 tablet (100 mg total) by mouth once daily.  -     spironolactone (ALDACTONE) 100 MG tablet; Take 1 tablet (100 mg total) by mouth once daily.    Gout, unspecified cause, unspecified chronicity, unspecified site  -     colchicine (COLCRYS) 0.6 mg tablet; Take 1 tablet (0.6 mg total) by mouth 2 (two) times daily.    Gastroesophageal reflux disease, unspecified whether esophagitis present  -     pantoprazole (PROTONIX) 40 MG tablet; Take 1 tablet (40 mg total) by mouth every morning.    Syncope, unspecified syncope type  -     Holter monitor - 48 hour; Future    Hyponatremia  Suspected SIADH   Repeat labs with hormone testing  -     Basic Metabolic Panel; Standing  -     Osmolality, Serum; Standing  -     Osmolality, Urine; Standing  -     Osmolality, Urine; Standing  -     TSH; Standing  -     Cortisol, 8AM; Standing  -     Sodium, Random Urine; Standing    Hospital discharge follow-up    Tachycardia    Other orders  -     allopurinoL (ZYLOPRIM) 300 MG tablet; Take 1 tablet (300 mg total) by mouth once daily.      Lets get a holter monitor to look at the pulse rate and the electrical activity of the heart   We refilled the medication  We should try the amlodipine for the blood pressure take one tablet once daily  Lets repeat your sodiium labs in 2 weeks and  "follow up the blood pressure   Lets get a holter monitor before you see the heart doctor        Subjective:       Patient ID: José Miguel Meng is a 47 y.o. female.    Chief Complaint: Follow-up      Interim Hx    Seen in the ed for syncope, had hospital follow up echo and CUS negative  Has fu with cardiology  Noted hyponatremia suspected SIADH elevated josi, uosm  She was drinking the night before  Noted tachy on telemtery  CT PE negative     Concerns today    Chronic problems       HPI  Patient Active Problem List   Diagnosis    Menometrorrhagia    Macromastia    Elevated LFTs    Normocytic anemia    Gout    Vitamin D deficiency    Essential hypertension    Class 1 obesity due to excess calories with serious comorbidity and body mass index (BMI) of 34.0 to 34.9 in adult    Prediabetes    Impaired mobility and activities of daily living    Decreased strength of lower extremity            Last 5 Patient Entered Readings                Current 30 Day Average: 123/85  Recent Readings 5/10/2023 5/10/2023 4/27/2023 4/25/2023    SBP (mmHg) 131 134 119 116    DBP (mmHg) 89 87 82 84    Pulse 82 76 96 88                  Lab Results   Component Value Date    TSH 0.964 06/27/2022           Review of Systems   All other systems reviewed and are negative.          Health Maintenance Due   Topic Date Due    COVID-19 Vaccine (1) Never done    Mammogram  07/01/2023       Objective:     BP (!) 110/90   Pulse 103   Ht 5' 9" (1.753 m)   Wt 98.2 kg (216 lb 7.9 oz)   LMP 04/07/2016 (Exact Date)   SpO2 99%   BMI 31.97 kg/m²     Vitals 5/11/2023 5/10/2023 4/25/2023 4/17/2023 4/11/2023   Height 69 69 69 67 -   Weight (lbs) 216.49 215.3 219 219.14 220   BMI (kg/m2) 32 31.8 32.3 34.3 -          Physical Exam  Vitals and nursing note reviewed.   Constitutional:       General: She is not in acute distress.     Appearance: She is well-developed. She is not diaphoretic.   HENT:      Head: Normocephalic.      Nose: Nose normal. "   Eyes:      General:         Right eye: No discharge.         Left eye: No discharge.      Conjunctiva/sclera: Conjunctivae normal.      Pupils: Pupils are equal, round, and reactive to light.   Cardiovascular:      Rate and Rhythm: Regular rhythm. Tachycardia present.      Heart sounds: Normal heart sounds. No murmur heard.    No friction rub. No gallop.   Pulmonary:      Effort: Pulmonary effort is normal. No respiratory distress.   Abdominal:      General: Bowel sounds are normal. There is no distension.      Palpations: Abdomen is soft.   Musculoskeletal:         General: No deformity. Normal range of motion.      Cervical back: Normal range of motion.   Skin:     General: Skin is warm.   Neurological:      Mental Status: She is alert and oriented to person, place, and time.      Cranial Nerves: No cranial nerve deficit.           Future Appointments   Date Time Provider Department [place]   5/25/2023  7:00 AM SPECIMEN RASHEEDASHASHANK MELLO SPECLAB [place]   5/25/2023  9:15 AM LAB, ONEL KENH LAB [place]   5/31/2023  4:00 PM Steve Skelton III, MD [facility]   6/12/2023  8:20 AM Isra Skinner III, MD [facility] CARDIO [place]         Medication List with Changes/Refills   Current Medications    CHLORHEXIDINE (PERIDEX) 0.12 % SOLUTION        INDOMETHACIN (INDOCIN) 50 MG CAPSULE    Take 1 capsule (50 mg total) by mouth 2 (two) times daily with meals.   Changed and/or Refilled Medications    Modified Medication Previous Medication    ALLOPURINOL (ZYLOPRIM) 300 MG TABLET allopurinoL (ZYLOPRIM) 300 MG tablet       Take 1 tablet (300 mg total) by mouth once daily.    Take 1 tablet (300 mg total) by mouth once daily.    COLCHICINE (COLCRYS) 0.6 MG TABLET colchicine (COLCRYS) 0.6 mg tablet       Take 1 tablet (0.6 mg total) by mouth 2 (two) times daily.    Take 1 tablet (0.6 mg total) by mouth 2 (two) times daily.    LOSARTAN (COZAAR) 100 MG TABLET losartan (COZAAR) 100 MG tablet       Take 1 tablet (100 mg total)  by mouth once daily.    Take 1 tablet (100 mg total) by mouth once daily.    PANTOPRAZOLE (PROTONIX) 40 MG TABLET pantoprazole (PROTONIX) 40 MG tablet       Take 1 tablet (40 mg total) by mouth every morning.    Take 1 tablet (40 mg total) by mouth every morning.    SPIRONOLACTONE (ALDACTONE) 100 MG TABLET spironolactone (ALDACTONE) 100 MG tablet       Take 1 tablet (100 mg total) by mouth once daily.    Take 1 tablet (100 mg total) by mouth once daily.         Disclaimer:  This note has been generated using voice-recognition software. There may be grammatical or spelling errors that have been missed during proof-reading

## 2023-05-19 ENCOUNTER — HOSPITAL ENCOUNTER (OUTPATIENT)
Dept: CARDIOLOGY | Facility: HOSPITAL | Age: 48
Discharge: HOME | End: 2023-05-19
Attending: INTERNAL MEDICINE
Payer: MEDICAID

## 2023-05-19 DIAGNOSIS — R55 SYNCOPE, UNSPECIFIED SYNCOPE TYPE: ICD-10-CM

## 2023-05-19 PROCEDURE — 93227 XTRNL ECG REC<48 HR R&I: CPT | Mod: ,,, | Performed by: INTERNAL MEDICINE

## 2023-05-19 PROCEDURE — 93225 XTRNL ECG REC<48 HRS REC: CPT

## 2023-05-19 PROCEDURE — 93227 HOLTER MONITOR - 48 HOUR (CUPID ONLY): ICD-10-PCS | Mod: ,,, | Performed by: INTERNAL MEDICINE

## 2023-05-24 LAB
OHS CV EVENT MONITOR DAY: 0
OHS CV HOLTER LENGTH DECIMAL HOURS: 47.98
OHS CV HOLTER LENGTH HOURS: 47
OHS CV HOLTER LENGTH MINUTES: 59
OHS CV HOLTER SINUS AVERAGE HR: 85
OHS CV HOLTER SINUS MAX HR: 145
OHS CV HOLTER SINUS MIN HR: 61

## 2023-05-25 ENCOUNTER — LAB VISIT (OUTPATIENT)
Dept: LAB | Facility: HOSPITAL | Age: 48
End: 2023-05-25
Attending: INTERNAL MEDICINE
Payer: MEDICAID

## 2023-05-25 DIAGNOSIS — E87.1 HYPONATREMIA: ICD-10-CM

## 2023-05-25 LAB
ANION GAP SERPL CALC-SCNC: 14 MMOL/L (ref 8–16)
BUN SERPL-MCNC: 9 MG/DL (ref 6–20)
CALCIUM SERPL-MCNC: 9.3 MG/DL (ref 8.7–10.5)
CHLORIDE SERPL-SCNC: 98 MMOL/L (ref 95–110)
CO2 SERPL-SCNC: 21 MMOL/L (ref 23–29)
CORTIS SERPL-MCNC: 16.4 UG/DL (ref 4.3–22.4)
CREAT SERPL-MCNC: 1.1 MG/DL (ref 0.5–1.4)
EST. GFR  (NO RACE VARIABLE): >60 ML/MIN/1.73 M^2
GLUCOSE SERPL-MCNC: 87 MG/DL (ref 70–110)
OSMOLALITY SERPL: 284 MOSM/KG (ref 275–295)
POTASSIUM SERPL-SCNC: 4.3 MMOL/L (ref 3.5–5.1)
SODIUM SERPL-SCNC: 133 MMOL/L (ref 136–145)
TSH SERPL DL<=0.005 MIU/L-ACNC: 2.54 UIU/ML (ref 0.4–4)

## 2023-05-25 PROCEDURE — 83930 ASSAY OF BLOOD OSMOLALITY: CPT | Performed by: INTERNAL MEDICINE

## 2023-05-25 PROCEDURE — 84443 ASSAY THYROID STIM HORMONE: CPT | Performed by: INTERNAL MEDICINE

## 2023-05-25 PROCEDURE — 80048 BASIC METABOLIC PNL TOTAL CA: CPT | Performed by: INTERNAL MEDICINE

## 2023-05-25 PROCEDURE — 82533 TOTAL CORTISOL: CPT | Performed by: INTERNAL MEDICINE

## 2023-05-25 PROCEDURE — 36415 COLL VENOUS BLD VENIPUNCTURE: CPT | Mod: PO | Performed by: INTERNAL MEDICINE

## 2023-05-30 PROBLEM — R79.89 ELEVATED LFTS: Status: RESOLVED | Noted: 2022-06-27 | Resolved: 2023-05-30

## 2023-05-30 PROBLEM — R29.898 DECREASED STRENGTH OF LOWER EXTREMITY: Status: RESOLVED | Noted: 2023-03-28 | Resolved: 2023-05-30

## 2023-05-30 PROBLEM — M10.49 OTHER SECONDARY GOUT, MULTIPLE SITES: Status: ACTIVE | Noted: 2022-06-27

## 2023-05-30 NOTE — PROGRESS NOTES
Assessment:       1. Essential hypertension    2. Normocytic anemia    3. Hyponatremia          Plan:         José Miguel was seen today for follow-up.    Diagnoses and all orders for this visit:    Essential hypertension  Chronic  Controlled  Patient is at goal today   I have reviewed lifestyle modification to achieve/maintain goals  We will continue the current medication regimen as listed below  Patient will follow up in 3 months   -     Basic Metabolic Panel; Standing    Normocytic anemia  -     CBC Without Differential; Standing    Hyponatremia  -     Ambulatory referral/consult to Endocrinology; Future        Lets have you see endocrine for the sodium   Let have you see cardiologist - please keep the schedule appointment  We will follow up in 3 month with prelabs    Subjective:       Patient ID: José Miguel Meng is a 47 y.o. female.    Chief Complaint: Follow-up      Lets get a holter monitor to look at the pulse rate and the electrical activity of the heart   We refilled the medication  We should try the amlodipine for the blood pressure take one tablet once daily  Lets repeat your sodiium labs in 2 weeks and follow up the blood pressure   Lets get a holter monitor before you see the heart doctor            Interim Hx  Holter negative  Labs equivocal     Concerns today    Chronic problems       Hypertension  This is a chronic problem. The current episode started more than 1 year ago. The problem has been waxing and waning since onset. The problem is controlled. Past treatments include angiotensin blockers (MRA). The current treatment provides significant improvement. There are no compliance problems.      Patient Active Problem List   Diagnosis                Normocytic anemia  Lab Results   Component Value Date    WBC 5.72 05/08/2023    HGB 11.4 (L) 05/08/2023    HCT 36.6 (L) 05/08/2023    MCV 95 05/08/2023     05/08/2023           Gout  Lab Results   Component Value Date    URICACID 1.9 (L)  "05/08/2023                     Prediabetes  Lab Results   Component Value Date    HGBA1C 5.4 05/08/2023                       Last 5 Patient Entered Readings                Current 30 Day Average: 129/88  Recent Readings 5/30/2023 5/29/2023 5/26/2023 5/26/2023 5/13/2023   SBP (mmHg) 117 134 125 140 123   DBP (mmHg) 80 98 89 96 85   Pulse 79 86 108 90 91                 Review of Systems   All other systems reviewed and are negative.          Health Maintenance Due   Topic Date Due    COVID-19 Vaccine (1) Never done    Mammogram  07/01/2023 7/6/2023  8:00 AM DES[facility] OIC MAMMO1 DES[facility] M[facility]MO [place]       Objective:     /80 (BP Location: Right arm, Patient Position: Sitting, BP Method: Large (Manual))   Pulse 99   Ht 5' 9" (1.753 m)   Wt 94.5 kg (208 lb 5.4 oz)   LMP 04/07/2016 (Exact Date)   SpO2 98%   BMI 30.77 kg/m²     Vitals 5/31/2023 5/11/2023 5/10/2023 4/25/2023 4/17/2023   Height 69 69 69 69 67   Weight (lbs) 208.34 216.49 215.3 219 219.14   BMI (kg/m2) 30.8 32 31.8 32.3 34.3       \       Physical Exam  Vitals and nursing note reviewed.   Constitutional:       General: She is not in acute distress.     Appearance: She is well-developed. She is not diaphoretic.   HENT:      Head: Normocephalic.      Nose: Nose normal.   Eyes:      General:         Right eye: No discharge.         Left eye: No discharge.      Conjunctiva/sclera: Conjunctivae normal.      Pupils: Pupils are equal, round, and reactive to light.   Cardiovascular:      Rate and Rhythm: Normal rate and regular rhythm.      Heart sounds: Normal heart sounds. No murmur heard.    No friction rub. No gallop.   Pulmonary:      Effort: Pulmonary effort is normal. No respiratory distress.   Abdominal:      General: Bowel sounds are normal. There is no distension.      Palpations: Abdomen is soft.   Musculoskeletal:         General: No deformity. Normal range of motion.      Cervical back: Normal range of motion.   Skin:     General: Skin is warm. "   Neurological:      Mental Status: She is alert and oriented to person, place, and time.      Cranial Nerves: No cranial nerve deficit.         Recent Results (from the past 336 hour(s))   Holter monitor - 48 hour    Collection Time: 05/19/23  8:23 AM   Result Value Ref Range    Holter length hours 47     holter length minutes 59     holter length dec hours 47.98     Sinus min HR 61     Sinus max hr 145     Sinus avg hr 85     Event Monitor Day 0    Osmolality, Urine    Collection Time: 05/25/23  7:00 AM   Result Value Ref Range    Osmolality, Urine 227 50 - 1200 mOsm/kg   Osmolality, Urine    Collection Time: 05/25/23  7:00 AM   Result Value Ref Range    Osmolality, Urine 227 50 - 1200 mOsm/kg   Sodium, Random Urine    Collection Time: 05/25/23  7:00 AM   Result Value Ref Range    Sodium, Urine 47 20 - 250 mmol/L   Basic Metabolic Panel    Collection Time: 05/25/23  7:18 AM   Result Value Ref Range    Sodium 133 (L) 136 - 145 mmol/L    Potassium 4.3 3.5 - 5.1 mmol/L    Chloride 98 95 - 110 mmol/L    CO2 21 (L) 23 - 29 mmol/L    Glucose 87 70 - 110 mg/dL    BUN 9 6 - 20 mg/dL    Creatinine 1.1 0.5 - 1.4 mg/dL    Calcium 9.3 8.7 - 10.5 mg/dL    Anion Gap 14 8 - 16 mmol/L    eGFR >60.0 >60 mL/min/1.73 m^2   Osmolality, Serum    Collection Time: 05/25/23  7:18 AM   Result Value Ref Range    Osmolality 284 275 - 295 mOsm/kg   TSH    Collection Time: 05/25/23  7:18 AM   Result Value Ref Range    TSH 2.541 0.400 - 4.000 uIU/mL   Cortisol, 8AM    Collection Time: 05/25/23  7:18 AM   Result Value Ref Range    Cortisol, 8 AM 16.40 4.30 - 22.40 ug/dL       Future Appointments   Date Time Provider Department Center   6/12/2023  8:20 AM Isra Skinner III, MD [facility] CARDIO [place]   7/6/2023  8:00 AM DESH OIC MAMMO1 DES[facility] M[facility] [place]   8/29/2023  8:30 AM LABSAURAV DES LAB [place]   8/31/2023  9:00 AM Steve Skelton III, MD [facility]         Medication List with Changes/Refills   Current Medications    ALLOPURINOL  (ZYLOPRIM) 300 MG TABLET    Take 1 tablet (300 mg total) by mouth once daily.    CHLORHEXIDINE (PERIDEX) 0.12 % SOLUTION        COLCHICINE (COLCRYS) 0.6 MG TABLET    Take 1 tablet (0.6 mg total) by mouth 2 (two) times daily.    INDOMETHACIN (INDOCIN) 50 MG CAPSULE    Take 1 capsule (50 mg total) by mouth 2 (two) times daily with meals.    LOSARTAN (COZAAR) 100 MG TABLET    Take 1 tablet (100 mg total) by mouth once daily.    PANTOPRAZOLE (PROTONIX) 40 MG TABLET    Take 1 tablet (40 mg total) by mouth every morning.    SPIRONOLACTONE (ALDACTONE) 100 MG TABLET    Take 1 tablet (100 mg total) by mouth once daily.         Disclaimer:  This note has been generated using voice-recognition software. There may be grammatical or spelling errors that have been missed during proof-reading

## 2023-05-31 ENCOUNTER — OFFICE VISIT (OUTPATIENT)
Dept: INTERNAL MEDICINE | Facility: CLINIC | Age: 48
End: 2023-05-31
Payer: MEDICAID

## 2023-05-31 VITALS
HEART RATE: 99 BPM | WEIGHT: 208.31 LBS | SYSTOLIC BLOOD PRESSURE: 110 MMHG | OXYGEN SATURATION: 98 % | BODY MASS INDEX: 30.85 KG/M2 | DIASTOLIC BLOOD PRESSURE: 80 MMHG | HEIGHT: 69 IN

## 2023-05-31 DIAGNOSIS — E87.1 HYPONATREMIA: ICD-10-CM

## 2023-05-31 DIAGNOSIS — D64.9 NORMOCYTIC ANEMIA: ICD-10-CM

## 2023-05-31 DIAGNOSIS — I10 ESSENTIAL HYPERTENSION: Primary | ICD-10-CM

## 2023-05-31 PROCEDURE — 3079F PR MOST RECENT DIASTOLIC BLOOD PRESSURE 80-89 MM HG: ICD-10-PCS | Mod: CPTII,,, | Performed by: INTERNAL MEDICINE

## 2023-05-31 PROCEDURE — 3074F SYST BP LT 130 MM HG: CPT | Mod: CPTII,,, | Performed by: INTERNAL MEDICINE

## 2023-05-31 PROCEDURE — 3044F PR MOST RECENT HEMOGLOBIN A1C LEVEL <7.0%: ICD-10-PCS | Mod: CPTII,,, | Performed by: INTERNAL MEDICINE

## 2023-05-31 PROCEDURE — 3008F PR BODY MASS INDEX (BMI) DOCUMENTED: ICD-10-PCS | Mod: CPTII,,, | Performed by: INTERNAL MEDICINE

## 2023-05-31 PROCEDURE — 99214 OFFICE O/P EST MOD 30 MIN: CPT | Mod: PBBFAC,PO | Performed by: INTERNAL MEDICINE

## 2023-05-31 PROCEDURE — 3008F BODY MASS INDEX DOCD: CPT | Mod: CPTII,,, | Performed by: INTERNAL MEDICINE

## 2023-05-31 PROCEDURE — 99214 PR OFFICE/OUTPT VISIT, EST, LEVL IV, 30-39 MIN: ICD-10-PCS | Mod: S$PBB,,, | Performed by: INTERNAL MEDICINE

## 2023-05-31 PROCEDURE — 4010F PR ACE/ARB THEARPY RXD/TAKEN: ICD-10-PCS | Mod: CPTII,,, | Performed by: INTERNAL MEDICINE

## 2023-05-31 PROCEDURE — 99999 PR PBB SHADOW E&M-EST. PATIENT-LVL IV: ICD-10-PCS | Mod: PBBFAC,,, | Performed by: INTERNAL MEDICINE

## 2023-05-31 PROCEDURE — 1160F PR REVIEW ALL MEDS BY PRESCRIBER/CLIN PHARMACIST DOCUMENTED: ICD-10-PCS | Mod: CPTII,,, | Performed by: INTERNAL MEDICINE

## 2023-05-31 PROCEDURE — 1159F PR MEDICATION LIST DOCUMENTED IN MEDICAL RECORD: ICD-10-PCS | Mod: CPTII,,, | Performed by: INTERNAL MEDICINE

## 2023-05-31 PROCEDURE — 3079F DIAST BP 80-89 MM HG: CPT | Mod: CPTII,,, | Performed by: INTERNAL MEDICINE

## 2023-05-31 PROCEDURE — 3044F HG A1C LEVEL LT 7.0%: CPT | Mod: CPTII,,, | Performed by: INTERNAL MEDICINE

## 2023-05-31 PROCEDURE — 99214 OFFICE O/P EST MOD 30 MIN: CPT | Mod: S$PBB,,, | Performed by: INTERNAL MEDICINE

## 2023-05-31 PROCEDURE — 4010F ACE/ARB THERAPY RXD/TAKEN: CPT | Mod: CPTII,,, | Performed by: INTERNAL MEDICINE

## 2023-05-31 PROCEDURE — 1160F RVW MEDS BY RX/DR IN RCRD: CPT | Mod: CPTII,,, | Performed by: INTERNAL MEDICINE

## 2023-05-31 PROCEDURE — 1159F MED LIST DOCD IN RCRD: CPT | Mod: CPTII,,, | Performed by: INTERNAL MEDICINE

## 2023-05-31 PROCEDURE — 99999 PR PBB SHADOW E&M-EST. PATIENT-LVL IV: CPT | Mod: PBBFAC,,, | Performed by: INTERNAL MEDICINE

## 2023-05-31 PROCEDURE — 3074F PR MOST RECENT SYSTOLIC BLOOD PRESSURE < 130 MM HG: ICD-10-PCS | Mod: CPTII,,, | Performed by: INTERNAL MEDICINE

## 2023-05-31 NOTE — PATIENT INSTRUCTIONS
Lets have you see endocrine for the sodium   Let have you see cardiologist - please keep the schedule appointment  We will follow up in 3 month with martha

## 2023-06-12 ENCOUNTER — OFFICE VISIT (OUTPATIENT)
Dept: CARDIOLOGY | Facility: CLINIC | Age: 48
End: 2023-06-12
Payer: MEDICAID

## 2023-06-12 VITALS
DIASTOLIC BLOOD PRESSURE: 78 MMHG | SYSTOLIC BLOOD PRESSURE: 120 MMHG | BODY MASS INDEX: 32.28 KG/M2 | WEIGHT: 213 LBS | HEART RATE: 83 BPM | HEIGHT: 68 IN | OXYGEN SATURATION: 99 %

## 2023-06-12 DIAGNOSIS — E66.09 CLASS 1 OBESITY DUE TO EXCESS CALORIES WITH SERIOUS COMORBIDITY AND BODY MASS INDEX (BMI) OF 33.0 TO 33.9 IN ADULT: ICD-10-CM

## 2023-06-12 DIAGNOSIS — I10 ESSENTIAL HYPERTENSION: ICD-10-CM

## 2023-06-12 DIAGNOSIS — R55 SYNCOPE, UNSPECIFIED SYNCOPE TYPE: ICD-10-CM

## 2023-06-12 DIAGNOSIS — R94.31 PROLONGED QT INTERVAL: ICD-10-CM

## 2023-06-12 DIAGNOSIS — R55 SYNCOPE AND COLLAPSE: ICD-10-CM

## 2023-06-12 PROCEDURE — 1159F PR MEDICATION LIST DOCUMENTED IN MEDICAL RECORD: ICD-10-PCS | Mod: CPTII,,, | Performed by: INTERNAL MEDICINE

## 2023-06-12 PROCEDURE — 4010F ACE/ARB THERAPY RXD/TAKEN: CPT | Mod: CPTII,,, | Performed by: INTERNAL MEDICINE

## 2023-06-12 PROCEDURE — 3078F DIAST BP <80 MM HG: CPT | Mod: CPTII,,, | Performed by: INTERNAL MEDICINE

## 2023-06-12 PROCEDURE — 99214 OFFICE O/P EST MOD 30 MIN: CPT | Mod: PBBFAC,PN | Performed by: INTERNAL MEDICINE

## 2023-06-12 PROCEDURE — 3074F PR MOST RECENT SYSTOLIC BLOOD PRESSURE < 130 MM HG: ICD-10-PCS | Mod: CPTII,,, | Performed by: INTERNAL MEDICINE

## 2023-06-12 PROCEDURE — 3044F HG A1C LEVEL LT 7.0%: CPT | Mod: CPTII,,, | Performed by: INTERNAL MEDICINE

## 2023-06-12 PROCEDURE — 93010 ELECTROCARDIOGRAM REPORT: CPT | Mod: S$PBB,,, | Performed by: INTERNAL MEDICINE

## 2023-06-12 PROCEDURE — 3074F SYST BP LT 130 MM HG: CPT | Mod: CPTII,,, | Performed by: INTERNAL MEDICINE

## 2023-06-12 PROCEDURE — 3044F PR MOST RECENT HEMOGLOBIN A1C LEVEL <7.0%: ICD-10-PCS | Mod: CPTII,,, | Performed by: INTERNAL MEDICINE

## 2023-06-12 PROCEDURE — 93005 ELECTROCARDIOGRAM TRACING: CPT | Mod: PBBFAC,PN | Performed by: INTERNAL MEDICINE

## 2023-06-12 PROCEDURE — 4010F PR ACE/ARB THEARPY RXD/TAKEN: ICD-10-PCS | Mod: CPTII,,, | Performed by: INTERNAL MEDICINE

## 2023-06-12 PROCEDURE — 99999 PR PBB SHADOW E&M-EST. PATIENT-LVL IV: ICD-10-PCS | Mod: PBBFAC,,, | Performed by: INTERNAL MEDICINE

## 2023-06-12 PROCEDURE — 3008F BODY MASS INDEX DOCD: CPT | Mod: CPTII,,, | Performed by: INTERNAL MEDICINE

## 2023-06-12 PROCEDURE — 1159F MED LIST DOCD IN RCRD: CPT | Mod: CPTII,,, | Performed by: INTERNAL MEDICINE

## 2023-06-12 PROCEDURE — 3008F PR BODY MASS INDEX (BMI) DOCUMENTED: ICD-10-PCS | Mod: CPTII,,, | Performed by: INTERNAL MEDICINE

## 2023-06-12 PROCEDURE — 99204 OFFICE O/P NEW MOD 45 MIN: CPT | Mod: S$PBB,25,, | Performed by: INTERNAL MEDICINE

## 2023-06-12 PROCEDURE — 93010 EKG 12-LEAD: ICD-10-PCS | Mod: S$PBB,,, | Performed by: INTERNAL MEDICINE

## 2023-06-12 PROCEDURE — 1160F RVW MEDS BY RX/DR IN RCRD: CPT | Mod: CPTII,,, | Performed by: INTERNAL MEDICINE

## 2023-06-12 PROCEDURE — 3078F PR MOST RECENT DIASTOLIC BLOOD PRESSURE < 80 MM HG: ICD-10-PCS | Mod: CPTII,,, | Performed by: INTERNAL MEDICINE

## 2023-06-12 PROCEDURE — 99204 PR OFFICE/OUTPT VISIT, NEW, LEVL IV, 45-59 MIN: ICD-10-PCS | Mod: S$PBB,25,, | Performed by: INTERNAL MEDICINE

## 2023-06-12 PROCEDURE — 99999 PR PBB SHADOW E&M-EST. PATIENT-LVL IV: CPT | Mod: PBBFAC,,, | Performed by: INTERNAL MEDICINE

## 2023-06-12 PROCEDURE — 1160F PR REVIEW ALL MEDS BY PRESCRIBER/CLIN PHARMACIST DOCUMENTED: ICD-10-PCS | Mod: CPTII,,, | Performed by: INTERNAL MEDICINE

## 2023-06-12 NOTE — PROGRESS NOTES
Subjective:    Patient ID:  José Miguel Meng is a 47 y.o. female who presents for evaluation of Pre Syncope      PCP: Roman Skelton III, MD     HPI  Pt is a 46 yo F w/ PMH of HTN who presents for evaluation of syncope.  She states that a few months ago she awoke from sleep and felt warm and flushed then developed presyncope and then had LOC.  EMS was call and she was noted to be hypotensive and fatigued.  She notes any episode of LOC 3 yrs ago and awoke w/o issue.  She denies cp, sob, edema, orthopnea, PND, palpitations, and claudication.  She was evaluated by her PCP who obtained and ECG, echo, and 48 hr Hotler which were negative.  She notes compliance w/ meds and denies side effects.  She monitors her BP at home and states it runs 110-140s/70-90s.  She does not exercise regularly however states she walks often for about 1 mile daily and denies limitations.         Past Medical History:   Diagnosis Date    Hypertension      Past Surgical History:   Procedure Laterality Date    BREAST SURGERY  02/15/2019    Reduction    HYSTERECTOMY      REDUCTION OF BOTH BREASTS Bilateral 02/15/2019    Procedure: MAMMOPLASTY, REDUCTION, BILATERAL;  Surgeon: Rajinder Aldrich MD;  Location: [facility] OR [address] [place] [address];  Service: Plastics;  Laterality: Bilateral;    TOTAL REDUCTION MAMMOPLASTY  02/14/2019    TUBAL LIGATION      TUBAL LIGATION       Social History     Socioeconomic History    Marital status: Significant Other    Number of children: 1   Tobacco Use    Smoking status: Never     Passive exposure: Current    Smokeless tobacco: Never   Substance and Sexual Activity    Alcohol use: Yes     Alcohol/week: 5.0 standard drinks     Types: 5 Glasses of wine per week     Comment: socially    Drug use: No    Sexual activity: Yes     Partners: Male     Birth control/protection: Condom, Partner-Vasectomy   Social History Narrative    Patient is originally from [place]        [employer]           No   background        Working nutrition educator [employer]                     1 Children - ruma        Lives with - daughter and partner hyun        Diet/Exercise ;         Exercise        Eating    B     L    D    Snacks    Beverages    Fast:food             Family History   Problem Relation Age of Onset    Hypertension Father         Soniat    Stroke Father     Hypertension Mother         Soniat    Vision loss Maternal Grandmother        Review of patient's allergies indicates:  No Known Allergies    Medication List with Changes/Refills   Current Medications    ALLOPURINOL (ZYLOPRIM) 300 MG TABLET    Take 1 tablet (300 mg total) by mouth once daily.    CHLORHEXIDINE (PERIDEX) 0.12 % SOLUTION        COLCHICINE (COLCRYS) 0.6 MG TABLET    Take 1 tablet (0.6 mg total) by mouth 2 (two) times daily.    INDOMETHACIN (INDOCIN) 50 MG CAPSULE    Take 1 capsule (50 mg total) by mouth 2 (two) times daily with meals.    LOSARTAN (COZAAR) 100 MG TABLET    Take 1 tablet (100 mg total) by mouth once daily.    PANTOPRAZOLE (PROTONIX) 40 MG TABLET    Take 1 tablet (40 mg total) by mouth every morning.    SPIRONOLACTONE (ALDACTONE) 100 MG TABLET    Take 1 tablet (100 mg total) by mouth once daily.       Review of Systems   Constitutional: Negative for diaphoresis and fever.   HENT:  Negative for congestion and hearing loss.    Eyes:  Negative for blurred vision and pain.   Cardiovascular:  Positive for near-syncope and syncope. Negative for chest pain, claudication, dyspnea on exertion, leg swelling and palpitations.   Respiratory:  Negative for shortness of breath and sleep disturbances due to breathing.    Hematologic/Lymphatic: Negative for bleeding problem. Does not bruise/bleed easily.   Skin:  Positive for flushing. Negative for color change and poor wound healing.   Gastrointestinal:  Negative for abdominal pain and nausea.   Genitourinary:  Negative for bladder incontinence and flank pain.  "  Neurological:  Negative for focal weakness and light-headedness.      Objective:   /78   Pulse 83   Ht 5' 8" (1.727 m)   Wt 96.6 kg (213 lb)   LMP 2016 (Exact Date)   SpO2 99%   BMI 32.39 kg/m²    Physical Exam  Constitutional:       Appearance: She is well-developed. She is obese. She is not diaphoretic.   HENT:      Head: Normocephalic and atraumatic.   Eyes:      General: No scleral icterus.     Pupils: Pupils are equal, round, and reactive to light.   Neck:      Vascular: No JVD.   Cardiovascular:      Rate and Rhythm: Normal rate and regular rhythm.      Pulses: Intact distal pulses.      Heart sounds: S1 normal and S2 normal. No murmur heard.    No friction rub. No gallop.   Pulmonary:      Effort: Pulmonary effort is normal. No respiratory distress.      Breath sounds: Normal breath sounds. No wheezing or rales.   Chest:      Chest wall: No tenderness.   Abdominal:      General: Bowel sounds are normal. There is no distension.      Palpations: Abdomen is soft. There is no mass.      Tenderness: There is no abdominal tenderness. There is no rebound.   Musculoskeletal:         General: No tenderness. Normal range of motion.      Cervical back: Normal range of motion and neck supple.      Right lower leg: No edema.      Left lower leg: No edema.   Skin:     General: Skin is warm and dry.      Coloration: Skin is not pale.   Neurological:      Mental Status: She is alert and oriented to person, place, and time.      Coordination: Coordination normal.      Deep Tendon Reflexes: Reflexes normal.   Psychiatric:         Behavior: Behavior normal.         Judgment: Judgment normal.         EC2023- reviewed.  NSR, NL ECG.     Echo: 2023- reviewed.    Summary    The left ventricle is normal in size with concentric remodeling and normal systolic function.  The estimated ejection fraction is 60%.  Normal right ventricular size with normal right ventricular systolic function.  Normal left " ventricular diastolic function.  The estimated PA systolic pressure is 33 mmHg.  Normal central venous pressure (3 mmHg).  There is no evidence of intracardiac shunting.    48 hr holter: 5/19/2023- reviewed.    Conclusion    Normal sinus rhythm with a heart rate variable between 61 bpm and 145 bpm(sinus tachycardia at 3:32 PM), averaging 85 bpm  There are very rare, isolated PVC's and PAC's    Assessment:       1. Syncope, unspecified syncope type    2. Prolonged QT interval    3. Essential hypertension    4. Class 1 obesity due to excess calories with serious comorbidity and body mass index (BMI) of 33.0 to 33.9 in adult    5. Syncope and collapse         Plan:         Essential hypertension  Goal BP < 130/80.  Compliant w/ meds.    - continue medical therapy   - risk factor and lifestyle modifications     Class 1 obesity due to excess calories with serious comorbidity and body mass index (BMI) of 33.0 to 33.9 in adult  Encouraged lifestyle modifications (diet, exercise, and weight loss).      Syncope and collapse  Per HPI and negative cardiac w/u most likely related to vasovagal.    - monitor clinically   - avoid triggers (notes presyncope w/ straining w/ BMs)       Total duration of face to face visit time 30 minutes.  Total time spent counseling greater than fifty percent of total visit time.  Counseling included discussion regarding imaging findings, diagnosis, possibilities, treatment options, risks and benefits.  The patient had many questions regarding the options and long-term effects      Isra Skinner M.D.  Interventional Cardiology

## 2023-06-12 NOTE — ASSESSMENT & PLAN NOTE
Per HPI and negative cardiac w/u most likely related to vasovagal.    - monitor clinically   - avoid triggers (notes presyncope w/ straining w/ BMs)

## 2023-07-28 ENCOUNTER — PATIENT MESSAGE (OUTPATIENT)
Dept: ADMINISTRATIVE | Facility: OTHER | Age: 48
End: 2023-07-28
Payer: MEDICAID

## 2023-08-28 ENCOUNTER — OFFICE VISIT (OUTPATIENT)
Dept: ENDOCRINOLOGY | Facility: CLINIC | Age: 48
End: 2023-08-28
Payer: MEDICAID

## 2023-08-28 VITALS
SYSTOLIC BLOOD PRESSURE: 120 MMHG | WEIGHT: 201.75 LBS | OXYGEN SATURATION: 99 % | BODY MASS INDEX: 30.67 KG/M2 | DIASTOLIC BLOOD PRESSURE: 70 MMHG | HEART RATE: 100 BPM

## 2023-08-28 DIAGNOSIS — R73.03 PREDIABETES: ICD-10-CM

## 2023-08-28 DIAGNOSIS — E87.1 HYPONATREMIA: ICD-10-CM

## 2023-08-28 DIAGNOSIS — I10 ESSENTIAL HYPERTENSION: ICD-10-CM

## 2023-08-28 PROCEDURE — 99999 PR PBB SHADOW E&M-EST. PATIENT-LVL III: CPT | Mod: PBBFAC,,, | Performed by: STUDENT IN AN ORGANIZED HEALTH CARE EDUCATION/TRAINING PROGRAM

## 2023-08-28 PROCEDURE — 99204 PR OFFICE/OUTPT VISIT, NEW, LEVL IV, 45-59 MIN: ICD-10-PCS | Mod: S$PBB,,, | Performed by: STUDENT IN AN ORGANIZED HEALTH CARE EDUCATION/TRAINING PROGRAM

## 2023-08-28 PROCEDURE — 99999 PR PBB SHADOW E&M-EST. PATIENT-LVL III: ICD-10-PCS | Mod: PBBFAC,,, | Performed by: STUDENT IN AN ORGANIZED HEALTH CARE EDUCATION/TRAINING PROGRAM

## 2023-08-28 PROCEDURE — 3008F BODY MASS INDEX DOCD: CPT | Mod: CPTII,,, | Performed by: STUDENT IN AN ORGANIZED HEALTH CARE EDUCATION/TRAINING PROGRAM

## 2023-08-28 PROCEDURE — 3078F DIAST BP <80 MM HG: CPT | Mod: CPTII,,, | Performed by: STUDENT IN AN ORGANIZED HEALTH CARE EDUCATION/TRAINING PROGRAM

## 2023-08-28 PROCEDURE — 3044F PR MOST RECENT HEMOGLOBIN A1C LEVEL <7.0%: ICD-10-PCS | Mod: CPTII,,, | Performed by: STUDENT IN AN ORGANIZED HEALTH CARE EDUCATION/TRAINING PROGRAM

## 2023-08-28 PROCEDURE — 4010F PR ACE/ARB THEARPY RXD/TAKEN: ICD-10-PCS | Mod: CPTII,,, | Performed by: STUDENT IN AN ORGANIZED HEALTH CARE EDUCATION/TRAINING PROGRAM

## 2023-08-28 PROCEDURE — 4010F ACE/ARB THERAPY RXD/TAKEN: CPT | Mod: CPTII,,, | Performed by: STUDENT IN AN ORGANIZED HEALTH CARE EDUCATION/TRAINING PROGRAM

## 2023-08-28 PROCEDURE — 1159F MED LIST DOCD IN RCRD: CPT | Mod: CPTII,,, | Performed by: STUDENT IN AN ORGANIZED HEALTH CARE EDUCATION/TRAINING PROGRAM

## 2023-08-28 PROCEDURE — 3008F PR BODY MASS INDEX (BMI) DOCUMENTED: ICD-10-PCS | Mod: CPTII,,, | Performed by: STUDENT IN AN ORGANIZED HEALTH CARE EDUCATION/TRAINING PROGRAM

## 2023-08-28 PROCEDURE — 99204 OFFICE O/P NEW MOD 45 MIN: CPT | Mod: S$PBB,,, | Performed by: STUDENT IN AN ORGANIZED HEALTH CARE EDUCATION/TRAINING PROGRAM

## 2023-08-28 PROCEDURE — 3074F PR MOST RECENT SYSTOLIC BLOOD PRESSURE < 130 MM HG: ICD-10-PCS | Mod: CPTII,,, | Performed by: STUDENT IN AN ORGANIZED HEALTH CARE EDUCATION/TRAINING PROGRAM

## 2023-08-28 PROCEDURE — 3078F PR MOST RECENT DIASTOLIC BLOOD PRESSURE < 80 MM HG: ICD-10-PCS | Mod: CPTII,,, | Performed by: STUDENT IN AN ORGANIZED HEALTH CARE EDUCATION/TRAINING PROGRAM

## 2023-08-28 PROCEDURE — 3044F HG A1C LEVEL LT 7.0%: CPT | Mod: CPTII,,, | Performed by: STUDENT IN AN ORGANIZED HEALTH CARE EDUCATION/TRAINING PROGRAM

## 2023-08-28 PROCEDURE — 1159F PR MEDICATION LIST DOCUMENTED IN MEDICAL RECORD: ICD-10-PCS | Mod: CPTII,,, | Performed by: STUDENT IN AN ORGANIZED HEALTH CARE EDUCATION/TRAINING PROGRAM

## 2023-08-28 PROCEDURE — 99213 OFFICE O/P EST LOW 20 MIN: CPT | Mod: PBBFAC | Performed by: STUDENT IN AN ORGANIZED HEALTH CARE EDUCATION/TRAINING PROGRAM

## 2023-08-28 PROCEDURE — 3074F SYST BP LT 130 MM HG: CPT | Mod: CPTII,,, | Performed by: STUDENT IN AN ORGANIZED HEALTH CARE EDUCATION/TRAINING PROGRAM

## 2023-08-28 RX ORDER — AMLODIPINE BESYLATE 10 MG/1
10 TABLET ORAL DAILY
Qty: 30 TABLET | Refills: 3 | Status: SHIPPED | OUTPATIENT
Start: 2023-08-28 | End: 2023-08-31

## 2023-08-28 NOTE — ASSESSMENT & PLAN NOTE
Following with PCP and Cardiology  BP well controlled this visit  Since we are discontinuing spironolactone will start amlodipine 10 mg daily. Discussed side effects of leg swelling

## 2023-08-28 NOTE — ASSESSMENT & PLAN NOTE
Key History and Diagnostic Findings  Hypotonic, Euvolemic Hyponatremia  Urine Na > 20  Urine Osm > 100  8AM Cortisol and TSH within the reference range  Etiologies include: SIADH vs. reset osmostat vs. medication vs. Stress    Plan  - Difficult to interpret urine sodium while on spironolactone.  - Stop spironolactone this visit and recheck BMP in 3 months  - Avoid drugs that may affect serum or urine sodium including HCTZ, Furosemide, NSAIDs  - If sodium remains low after discontinuation of medication    - Supplemental criteria supportive of SIADH   - Abnormal water load test   - Plasma  inappropriately elevated relative to plasma osmolarity   - Failure to correct after fluids but biochemical correction after fluid restriction   - Normal thyroid, adrenal, and renal function

## 2023-08-28 NOTE — PROGRESS NOTES
NEW PATIENT VISIT    Subjective:      Chief Complaint: No chief complaint on file.      HPI: José Miguel Meng is a 48 y.o. female who is here for  hyponatremia  Lower Na levels since atleast 06/2022. Was normal in 2019. She doesn't know anything in particular that happened around June of 2022 that would affect her sodium. She was on HCTZ in the past and is now on Spironolactone. Thinks she may have started spironolactone about a year ago.  She drinks about 60 oz of water a day.  Medical history significant for hypertension, gout and GERD.      Regarding Hyponatremia/SIADH:    -  Lab Results   Component Value Date    SPECGRAV 1.005 06/27/2022    SPECGRAV 1.010 04/06/2016    LABOSMO 284 05/25/2023    LABOSMO 280 04/17/2023     (L) 05/25/2023     (L) 05/08/2023     (L) 04/17/2023    OSMOLALITYUR 227 05/25/2023    OSMOLALITYUR 227 05/25/2023    OSMOLALITYUR 665 04/17/2023    SODIUMURR 47 05/25/2023    SODIUMURR 159 04/17/2023    TSH 2.541 05/25/2023    TSH 2.32 04/11/2023        -Potential etiology of SIADH:  No   Yes  [x]    []  CNS disturbance (stroke, hemorrhage, infection, trauma, and psychosis)  [x]    []  Malignancy (especially small cell lung cancer)  [x]    []  Medications (chlorpropamide, carbamazapine, oxcarbazepine, cyclophosphamide, SSRIs and others)  [x]    []  Recent surgery (especially TSS)  [x]    []  Pulmonary disease (especially pneumonia)  [x]    []  Hormone deficiency (hypopituitarism or hypothyroidism)  [x]    []  Hormone excess (recent vasopressin, ddAVP, or oxytocin)  [x]    []  HIV infection  [x]    []  Hereditary (Any positive family history of low sodium)  [x]    []  Prior idiopathic SIADH    ROS: See HPI    Past Medical History:   Diagnosis Date    Hypertension        Past Surgical History:   Procedure Laterality Date    BREAST SURGERY  02/15/2019    Reduction    HYSTERECTOMY      REDUCTION OF BOTH BREASTS Bilateral 02/15/2019    Procedure: MAMMOPLASTY, REDUCTION,  BILATERAL;  Surgeon: Rajinder Aldrich MD;  Location: [facility] OR [address] [place] [address];  Service: Plastics;  Laterality: Bilateral;    TOTAL REDUCTION MAMMOPLASTY  02/14/2019    TUBAL LIGATION      TUBAL LIGATION         Reviewed past medical, family, social history and updated as appropriate.    Objective:     /70   Pulse 100   Wt 91.5 kg (201 lb 11.5 oz)   LMP 04/07/2016 (Exact Date)   SpO2 99%   BMI 30.67 kg/m²     BP Readings from Last 5 Encounters:   08/28/23 120/70   06/12/23 120/78   05/31/23 110/80   05/11/23 (!) 110/90   05/10/23 (!) 135/93       Body mass index is 30.67 kg/m².    Wt Readings from Last 3 Encounters:   08/28/23 1320 91.5 kg (201 lb 11.5 oz)   07/06/23 0802 96.6 kg (213 lb)   06/12/23 0819 96.6 kg (213 lb)       Physical Exam  Constitutional:       General: She is not in acute distress.     Appearance: She is not ill-appearing.   HENT:      Nose: Nose normal.      Mouth/Throat:      Mouth: Mucous membranes are moist.   Eyes:      Extraocular Movements: Extraocular movements intact.   Cardiovascular:      Rate and Rhythm: Regular rhythm.      Pulses: Normal pulses.   Pulmonary:      Effort: Pulmonary effort is normal. No respiratory distress.   Musculoskeletal:      Cervical back: Neck supple.      Right lower leg: No edema.      Left lower leg: No edema.   Neurological:      Mental Status: She is alert. Mental status is at baseline.   Psychiatric:         Mood and Affect: Mood normal.         Lab Review:   Lab Results   Component Value Date    HGBA1C 5.4 05/08/2023     Lab Results   Component Value Date    CHOL 191 05/08/2023    HDL 55 05/08/2023    LDLCALC 108.8 05/08/2023    TRIG 136 05/08/2023    CHOLHDL 28.8 05/08/2023     Lab Results   Component Value Date     (L) 05/25/2023    K 4.3 05/25/2023    CL 98 05/25/2023    CO2 21 (L) 05/25/2023    GLU 87 05/25/2023    BUN 9 05/25/2023    CREATININE 1.1 05/25/2023    CALCIUM 9.3 05/25/2023    PROT 7.3 05/08/2023    ALBUMIN 3.4 (L) 05/08/2023  "   BILITOT 0.3 05/08/2023    ALKPHOS 80 05/08/2023    AST 26 05/08/2023    ALT 22 05/08/2023    ANIONGAP 14 05/25/2023    ESTGFRAFRICA 78 (L) 04/11/2023    EGFRNONAA >60.0 06/27/2022    TSH 2.541 05/25/2023     No results found for: "[id number]"  Lab Results   Component Value Date    WBC 5.72 05/08/2023    HGB 11.4 (L) 05/08/2023    HCT 36.6 (L) 05/08/2023    MCV 95 05/08/2023     05/08/2023         Assessment/Plan:     Hyponatremia  Key History and Diagnostic Findings  Hypotonic, Euvolemic Hyponatremia  Urine Na > 20  Urine Osm > 100  8AM Cortisol and TSH within the reference range  Etiologies include: SIADH vs. reset osmostat vs. medication vs. Stress    Plan  - Difficult to interpret urine sodium while on spironolactone.  - Stop spironolactone this visit and recheck BMP in 3 months  - Avoid drugs that may affect serum or urine sodium including HCTZ, Furosemide, NSAIDs  - If sodium remains low after discontinuation of medication    - Supplemental criteria supportive of SIADH   - Abnormal water load test   - Plasma  inappropriately elevated relative to plasma osmolarity   - Failure to correct after fluids but biochemical correction after fluid restriction   - Normal thyroid, adrenal, and renal function    Essential hypertension  Following with PCP and Cardiology  BP well controlled this visit  Since we are discontinuing spironolactone will start amlodipine 10 mg daily. Discussed side effects of leg swelling    Prediabetes  Last A1c 5.4  Management per primary      No follow-ups on file.    "

## 2023-08-30 NOTE — PROGRESS NOTES
Assessment:       1. Essential hypertension    2. Prediabetes    3. Other secondary chronic gout of multiple sites without tophus          Plan:         José Miguel was seen today for follow-up.    Diagnoses and all orders for this visit:    Essential hypertension  Chronic  Controlled  Patient is at goal today   I have reviewed lifestyle modification to achieve/maintain goals  We will continue the current medication regimen as listed below  Patient will follow up in 3 months   -     spironolactone (ALDACTONE) 100 MG tablet; Take 1 tablet (100 mg total) by mouth once daily.  -     losartan (COZAAR) 100 MG tablet; Take 1 tablet (100 mg total) by mouth once daily.    Prediabetes  -     Lipid Panel; Standing    Other secondary chronic gout of multiple sites without tophus  -     allopurinoL (ZYLOPRIM) 300 MG tablet; Take 1 tablet (300 mg total) by mouth once daily.    Leg swelling    Gastroesophageal reflux disease, unspecified whether esophagitis present  -     pantoprazole (PROTONIX) 40 MG tablet; Take 1 tablet (40 mg total) by mouth every morning.      Msg to providers tasha Swartz, charles, dori maza   Thanks everyone for helping with this patient. She is nevrous about starting the amlodipine bc of leg swelling so she didn't start it. We are going to then keep aldactone, and losartan for now. Her Na did improve . She is going to check the BP on dig med more regularly. We discussed diet. Just wanted to keep everyone on the same page       Subjective:       Patient ID: José Miguel Meng is a 48 y.o. female.    Chief Complaint: Follow-up      Interim Hx  Last seen by me in may   Her fathe ris sick in the hospital  Seen by cardiology and endocrine who stopped the aldactone for Na . She has not started this  Concerns today    Chronic problems       Hypertension  This is a chronic problem. The current episode started more than 1 year ago. The problem has been waxing and waning since onset. Past treatments include  "angiotensin blockers and calcium channel blockers.      Last 5 Patient Entered Readings                Current 30 Day Average: 125/87  Recent Readings 8/30/2023 8/30/2023 8/27/2023 8/27/2023 8/16/2023   SBP (mmHg) 144 146 123 121 110   DBP (mmHg) 103 101 93 85 75   Pulse 96 95 97 87 78                 Review of Systems   All other systems reviewed and are negative.            Health Maintenance Due   Topic Date Due    COVID-19 Vaccine (1) Never done         Objective:     /82 (BP Location: Right arm, Patient Position: Sitting, BP Method: Large (Manual))   Pulse 99   Ht 5' 8" (1.727 m)   Wt 92.8 kg (204 lb 9.4 oz)   LMP 04/07/2016 (Exact Date)   SpO2 100%   BMI 31.11 kg/m²         8/31/2023     9:01 AM 8/28/2023     1:20 PM 7/6/2023     8:02 AM 6/12/2023     8:19 AM 5/31/2023     3:59 PM   Vitals   Height 5' 8" (1.727 m)  5' 8" (1.727 m) 5' 8" (1.727 m) 5' 9" (1.753 m)   Weight (lbs) 204.59 201.72 213 213 208.34   BMI (kg/m2) 31.1  32.4 32.4 30.8            Physical Exam  Vitals and nursing note reviewed.   Constitutional:       General: She is not in acute distress.     Appearance: She is well-developed. She is not diaphoretic.   HENT:      Head: Normocephalic.      Nose: Nose normal.   Eyes:      General:         Right eye: No discharge.         Left eye: No discharge.      Conjunctiva/sclera: Conjunctivae normal.      Pupils: Pupils are equal, round, and reactive to light.   Cardiovascular:      Rate and Rhythm: Normal rate and regular rhythm.      Heart sounds: Normal heart sounds. No murmur heard.     No friction rub. No gallop.   Pulmonary:      Effort: Pulmonary effort is normal. No respiratory distress.   Abdominal:      General: Bowel sounds are normal. There is no distension.      Palpations: Abdomen is soft.   Musculoskeletal:         General: No deformity. Normal range of motion.      Cervical back: Normal range of motion.      Right lower leg: No edema.      Left lower leg: No edema. "   Skin:     General: Skin is warm.   Neurological:      Mental Status: She is alert and oriented to person, place, and time.      Cranial Nerves: No cranial nerve deficit.           Recent Results (from the past 336 hour(s))   Hemoglobin A1C    Collection Time: 08/29/23  8:38 AM   Result Value Ref Range    Hemoglobin A1C 5.5 4.0 - 5.6 %    Estimated Avg Glucose 111 68 - 131 mg/dL   Hepatic Function Panel    Collection Time: 08/29/23  8:38 AM   Result Value Ref Range    Total Protein 8.0 6.0 - 8.4 g/dL    Albumin 4.3 3.5 - 5.2 g/dL    Total Bilirubin 0.4 0.1 - 1.0 mg/dL    AST 40 15 - 46 U/L    ALT 28 10 - 44 U/L    Alkaline Phosphatase 51 38 - 126 U/L    Bilirubin, Direct 0.2 0.0 - 0.3 mg/dL   Lipid Panel    Collection Time: 08/29/23  8:38 AM   Result Value Ref Range    Cholesterol 233 (H) 120 - 199 mg/dL    Triglycerides 268 (H) 30 - 150 mg/dL    HDL 70 40 - 75 mg/dL    LDL Cholesterol 109.4 63.0 - 159.0 mg/dL    HDL/Cholesterol Ratio 30.0 20.0 - 50.0 %    Total Cholesterol/HDL Ratio 3.3 2.0 - 5.0    Non-HDL Cholesterol 163 mg/dL   Basic Metabolic Panel    Collection Time: 08/29/23  8:38 AM   Result Value Ref Range    Sodium 135 (L) 136 - 145 mmol/L    Potassium 4.7 3.5 - 5.1 mmol/L    Chloride 100 95 - 110 mmol/L    CO2 21 (L) 23 - 29 mmol/L    Glucose 102 70 - 110 mg/dL    BUN 10 7 - 17 mg/dL    Creatinine 1.06 0.50 - 1.40 mg/dL    Calcium 9.1 8.7 - 10.5 mg/dL    Anion Gap 14 8 - 16 mmol/L    eGFR >60.0 >60 mL/min/1.73 m^2   CBC Without Differential    Collection Time: 08/29/23  8:38 AM   Result Value Ref Range    WBC 5.54 3.90 - 12.70 K/uL    RBC 3.32 (L) 4.00 - 5.40 M/uL    Hemoglobin 10.1 (L) 12.0 - 16.0 g/dL    Hematocrit 30.3 (L) 37.0 - 48.5 %    MCV 91 82 - 98 fL    MCH 30.4 27.0 - 31.0 pg    MCHC 33.3 32.0 - 36.0 g/dL    RDW 14.1 11.5 - 14.5 %    Platelets 255 150 - 450 K/uL    MPV 10.6 9.2 - 12.9 fL       Future Appointments   Date Time Provider Department Center   11/28/2023  8:00 AM LAB, SAURAV MACIAS  [facility] [place]   12/5/2023  9:00 AM Steve Skelton III, MD [facility]         Medication List with Changes/Refills   Current Medications    ALLOPURINOL (ZYLOPRIM) 300 MG TABLET    Take 1 tablet (300 mg total) by mouth once daily.    AMLODIPINE (NORVASC) 10 MG TABLET    Take 1 tablet (10 mg total) by mouth once daily.    CHLORHEXIDINE (PERIDEX) 0.12 % SOLUTION        COLCHICINE (COLCRYS) 0.6 MG TABLET    Take 1 tablet (0.6 mg total) by mouth 2 (two) times daily.    INDOMETHACIN (INDOCIN) 50 MG CAPSULE    Take 1 capsule (50 mg total) by mouth 2 (two) times daily with meals.    LOSARTAN (COZAAR) 100 MG TABLET    Take 1 tablet (100 mg total) by mouth once daily.    PANTOPRAZOLE (PROTONIX) 40 MG TABLET    Take 1 tablet (40 mg total) by mouth every morning.         Disclaimer:  This note has been generated using voice-recognition software. There may be grammatical or spelling errors that have been missed during proof-reading

## 2023-08-31 ENCOUNTER — OFFICE VISIT (OUTPATIENT)
Dept: INTERNAL MEDICINE | Facility: CLINIC | Age: 48
End: 2023-08-31
Payer: MEDICAID

## 2023-08-31 VITALS
HEART RATE: 99 BPM | WEIGHT: 204.56 LBS | BODY MASS INDEX: 31 KG/M2 | DIASTOLIC BLOOD PRESSURE: 82 MMHG | OXYGEN SATURATION: 100 % | HEIGHT: 68 IN | SYSTOLIC BLOOD PRESSURE: 122 MMHG

## 2023-08-31 DIAGNOSIS — I10 ESSENTIAL HYPERTENSION: Primary | ICD-10-CM

## 2023-08-31 DIAGNOSIS — R73.03 PREDIABETES: ICD-10-CM

## 2023-08-31 DIAGNOSIS — K21.9 GASTROESOPHAGEAL REFLUX DISEASE, UNSPECIFIED WHETHER ESOPHAGITIS PRESENT: ICD-10-CM

## 2023-08-31 DIAGNOSIS — M79.89 LEG SWELLING: ICD-10-CM

## 2023-08-31 DIAGNOSIS — M1A.49X0 OTHER SECONDARY CHRONIC GOUT OF MULTIPLE SITES WITHOUT TOPHUS: ICD-10-CM

## 2023-08-31 PROCEDURE — 3074F SYST BP LT 130 MM HG: CPT | Mod: CPTII,,, | Performed by: INTERNAL MEDICINE

## 2023-08-31 PROCEDURE — 99999 PR PBB SHADOW E&M-EST. PATIENT-LVL III: CPT | Mod: PBBFAC,,, | Performed by: INTERNAL MEDICINE

## 2023-08-31 PROCEDURE — 4010F ACE/ARB THERAPY RXD/TAKEN: CPT | Mod: CPTII,,, | Performed by: INTERNAL MEDICINE

## 2023-08-31 PROCEDURE — 3074F PR MOST RECENT SYSTOLIC BLOOD PRESSURE < 130 MM HG: ICD-10-PCS | Mod: CPTII,,, | Performed by: INTERNAL MEDICINE

## 2023-08-31 PROCEDURE — 3008F PR BODY MASS INDEX (BMI) DOCUMENTED: ICD-10-PCS | Mod: CPTII,,, | Performed by: INTERNAL MEDICINE

## 2023-08-31 PROCEDURE — 1159F MED LIST DOCD IN RCRD: CPT | Mod: CPTII,,, | Performed by: INTERNAL MEDICINE

## 2023-08-31 PROCEDURE — 99214 PR OFFICE/OUTPT VISIT, EST, LEVL IV, 30-39 MIN: ICD-10-PCS | Mod: S$PBB,,, | Performed by: INTERNAL MEDICINE

## 2023-08-31 PROCEDURE — 99999 PR PBB SHADOW E&M-EST. PATIENT-LVL III: ICD-10-PCS | Mod: PBBFAC,,, | Performed by: INTERNAL MEDICINE

## 2023-08-31 PROCEDURE — 99214 OFFICE O/P EST MOD 30 MIN: CPT | Mod: S$PBB,,, | Performed by: INTERNAL MEDICINE

## 2023-08-31 PROCEDURE — 1159F PR MEDICATION LIST DOCUMENTED IN MEDICAL RECORD: ICD-10-PCS | Mod: CPTII,,, | Performed by: INTERNAL MEDICINE

## 2023-08-31 PROCEDURE — 3008F BODY MASS INDEX DOCD: CPT | Mod: CPTII,,, | Performed by: INTERNAL MEDICINE

## 2023-08-31 PROCEDURE — 4010F PR ACE/ARB THEARPY RXD/TAKEN: ICD-10-PCS | Mod: CPTII,,, | Performed by: INTERNAL MEDICINE

## 2023-08-31 PROCEDURE — 3044F PR MOST RECENT HEMOGLOBIN A1C LEVEL <7.0%: ICD-10-PCS | Mod: CPTII,,, | Performed by: INTERNAL MEDICINE

## 2023-08-31 PROCEDURE — 3079F PR MOST RECENT DIASTOLIC BLOOD PRESSURE 80-89 MM HG: ICD-10-PCS | Mod: CPTII,,, | Performed by: INTERNAL MEDICINE

## 2023-08-31 PROCEDURE — 3044F HG A1C LEVEL LT 7.0%: CPT | Mod: CPTII,,, | Performed by: INTERNAL MEDICINE

## 2023-08-31 PROCEDURE — 99213 OFFICE O/P EST LOW 20 MIN: CPT | Mod: PBBFAC,PO | Performed by: INTERNAL MEDICINE

## 2023-08-31 PROCEDURE — 3079F DIAST BP 80-89 MM HG: CPT | Mod: CPTII,,, | Performed by: INTERNAL MEDICINE

## 2023-08-31 RX ORDER — ALLOPURINOL 300 MG/1
300 TABLET ORAL DAILY
Qty: 90 TABLET | Refills: 1 | Status: SHIPPED | OUTPATIENT
Start: 2023-08-31 | End: 2023-12-05 | Stop reason: SDUPTHER

## 2023-08-31 RX ORDER — SPIRONOLACTONE 100 MG/1
100 TABLET, FILM COATED ORAL DAILY
Qty: 90 TABLET | Refills: 1 | Status: SHIPPED | OUTPATIENT
Start: 2023-08-31 | End: 2023-12-05 | Stop reason: SDUPTHER

## 2023-08-31 RX ORDER — PANTOPRAZOLE SODIUM 40 MG/1
40 TABLET, DELAYED RELEASE ORAL EVERY MORNING
Qty: 90 TABLET | Refills: 1 | Status: SHIPPED | OUTPATIENT
Start: 2023-08-31 | End: 2023-12-05 | Stop reason: SDUPTHER

## 2023-08-31 RX ORDER — LOSARTAN POTASSIUM 100 MG/1
100 TABLET ORAL DAILY
Qty: 90 TABLET | Refills: 1 | Status: SHIPPED | OUTPATIENT
Start: 2023-08-31 | End: 2023-12-05 | Stop reason: SDUPTHER

## 2023-08-31 NOTE — PATIENT INSTRUCTIONS
We were happy to see you today    For your Testing  Please have your labs and/or imaging test done  in [facility]         For your Medication   We will keep the aldactone  We will not start amlodipine   For more information about side effects please visit medlineplus.gov        For your Referrals  none          Please return to clinic in    Follow up for in november .        Extra resources     I will message the whole team so we are all on the same page

## 2023-08-31 NOTE — Clinical Note
Hi  Thanks everyone for helping with this patient. She is nevrous about starting the amlodipine bc of leg swelling so she didn't start it. We are going to then keep aldactone, and losartan for now. Her Na did improve . She is going to check the BP on dig med more regularly. We discussed diet. Just wanted to keep everyone on the same page

## 2023-12-05 ENCOUNTER — OFFICE VISIT (OUTPATIENT)
Dept: INTERNAL MEDICINE | Facility: CLINIC | Age: 48
End: 2023-12-05
Payer: MEDICAID

## 2023-12-05 VITALS
OXYGEN SATURATION: 100 % | HEIGHT: 68 IN | DIASTOLIC BLOOD PRESSURE: 84 MMHG | BODY MASS INDEX: 29.57 KG/M2 | HEART RATE: 85 BPM | WEIGHT: 195.13 LBS | SYSTOLIC BLOOD PRESSURE: 120 MMHG

## 2023-12-05 DIAGNOSIS — E78.1 PURE HYPERTRIGLYCERIDEMIA: ICD-10-CM

## 2023-12-05 DIAGNOSIS — M1A.49X0 OTHER SECONDARY CHRONIC GOUT OF MULTIPLE SITES WITHOUT TOPHUS: ICD-10-CM

## 2023-12-05 DIAGNOSIS — R73.03 PREDIABETES: ICD-10-CM

## 2023-12-05 DIAGNOSIS — I10 ESSENTIAL HYPERTENSION: Primary | ICD-10-CM

## 2023-12-05 DIAGNOSIS — K21.9 GASTROESOPHAGEAL REFLUX DISEASE, UNSPECIFIED WHETHER ESOPHAGITIS PRESENT: ICD-10-CM

## 2023-12-05 DIAGNOSIS — Z82.49 FAMILY HISTORY OF EARLY CAD: ICD-10-CM

## 2023-12-05 PROBLEM — E78.00 PURE HYPERCHOLESTEROLEMIA: Status: ACTIVE | Noted: 2023-12-05

## 2023-12-05 PROCEDURE — 4010F ACE/ARB THERAPY RXD/TAKEN: CPT | Mod: CPTII,,, | Performed by: INTERNAL MEDICINE

## 2023-12-05 PROCEDURE — 1160F PR REVIEW ALL MEDS BY PRESCRIBER/CLIN PHARMACIST DOCUMENTED: ICD-10-PCS | Mod: CPTII,,, | Performed by: INTERNAL MEDICINE

## 2023-12-05 PROCEDURE — 99999 PR PBB SHADOW E&M-EST. PATIENT-LVL IV: ICD-10-PCS | Mod: PBBFAC,,, | Performed by: INTERNAL MEDICINE

## 2023-12-05 PROCEDURE — 1159F MED LIST DOCD IN RCRD: CPT | Mod: CPTII,,, | Performed by: INTERNAL MEDICINE

## 2023-12-05 PROCEDURE — 3079F DIAST BP 80-89 MM HG: CPT | Mod: CPTII,,, | Performed by: INTERNAL MEDICINE

## 2023-12-05 PROCEDURE — 99999 PR PBB SHADOW E&M-EST. PATIENT-LVL IV: CPT | Mod: PBBFAC,,, | Performed by: INTERNAL MEDICINE

## 2023-12-05 PROCEDURE — 3008F BODY MASS INDEX DOCD: CPT | Mod: CPTII,,, | Performed by: INTERNAL MEDICINE

## 2023-12-05 PROCEDURE — 99214 OFFICE O/P EST MOD 30 MIN: CPT | Mod: S$PBB,,, | Performed by: INTERNAL MEDICINE

## 2023-12-05 PROCEDURE — 1159F PR MEDICATION LIST DOCUMENTED IN MEDICAL RECORD: ICD-10-PCS | Mod: CPTII,,, | Performed by: INTERNAL MEDICINE

## 2023-12-05 PROCEDURE — 4010F PR ACE/ARB THEARPY RXD/TAKEN: ICD-10-PCS | Mod: CPTII,,, | Performed by: INTERNAL MEDICINE

## 2023-12-05 PROCEDURE — 3079F PR MOST RECENT DIASTOLIC BLOOD PRESSURE 80-89 MM HG: ICD-10-PCS | Mod: CPTII,,, | Performed by: INTERNAL MEDICINE

## 2023-12-05 PROCEDURE — 3044F HG A1C LEVEL LT 7.0%: CPT | Mod: CPTII,,, | Performed by: INTERNAL MEDICINE

## 2023-12-05 PROCEDURE — 3074F PR MOST RECENT SYSTOLIC BLOOD PRESSURE < 130 MM HG: ICD-10-PCS | Mod: CPTII,,, | Performed by: INTERNAL MEDICINE

## 2023-12-05 PROCEDURE — 3008F PR BODY MASS INDEX (BMI) DOCUMENTED: ICD-10-PCS | Mod: CPTII,,, | Performed by: INTERNAL MEDICINE

## 2023-12-05 PROCEDURE — 3044F PR MOST RECENT HEMOGLOBIN A1C LEVEL <7.0%: ICD-10-PCS | Mod: CPTII,,, | Performed by: INTERNAL MEDICINE

## 2023-12-05 PROCEDURE — 1160F RVW MEDS BY RX/DR IN RCRD: CPT | Mod: CPTII,,, | Performed by: INTERNAL MEDICINE

## 2023-12-05 PROCEDURE — 99214 OFFICE O/P EST MOD 30 MIN: CPT | Mod: PBBFAC,PO | Performed by: INTERNAL MEDICINE

## 2023-12-05 PROCEDURE — 99214 PR OFFICE/OUTPT VISIT, EST, LEVL IV, 30-39 MIN: ICD-10-PCS | Mod: S$PBB,,, | Performed by: INTERNAL MEDICINE

## 2023-12-05 PROCEDURE — 3074F SYST BP LT 130 MM HG: CPT | Mod: CPTII,,, | Performed by: INTERNAL MEDICINE

## 2023-12-05 RX ORDER — PANTOPRAZOLE SODIUM 40 MG/1
40 TABLET, DELAYED RELEASE ORAL EVERY MORNING
Qty: 90 TABLET | Refills: 1 | Status: SHIPPED | OUTPATIENT
Start: 2023-12-05

## 2023-12-05 RX ORDER — LOSARTAN POTASSIUM 100 MG/1
100 TABLET ORAL DAILY
Qty: 90 TABLET | Refills: 1 | Status: SHIPPED | OUTPATIENT
Start: 2023-12-05 | End: 2024-12-04

## 2023-12-05 RX ORDER — SPIRONOLACTONE 100 MG/1
100 TABLET, FILM COATED ORAL DAILY
Qty: 90 TABLET | Refills: 1 | Status: SHIPPED | OUTPATIENT
Start: 2023-12-05 | End: 2024-03-11 | Stop reason: SDUPTHER

## 2023-12-05 RX ORDER — ALLOPURINOL 300 MG/1
300 TABLET ORAL DAILY
Qty: 90 TABLET | Refills: 1 | Status: SHIPPED | OUTPATIENT
Start: 2023-12-05

## 2023-12-05 NOTE — PATIENT INSTRUCTIONS
We were happy to see you today    For your Testing  Please have your labs and/or imaging test done  1  Calcium score   Labs in 6 months        For your Medication   We can use the calcium score to help us decided on medications   For more information about side effects please visit medlineplus.gov        For your Referrals        For your Vaccinations  We gave your the following vaccines    Please obtain the following vaccines at the pharmacy  flu        Please return to clinic in    Follow up for 6 months Office Visit with ELVIA thomas.        Extra resources

## 2023-12-05 NOTE — PROGRESS NOTES
Assessment:         1. Essential hypertension    2. Prediabetes    3. Other secondary chronic gout of multiple sites without tophus    4. Gastroesophageal reflux disease, unspecified whether esophagitis present    5. Pure hypertriglyceridemia    6. Family history of early CAD          Plan:           José Miguel was seen today for follow-up.    Diagnoses and all orders for this visit:    Essential hypertension  Chronic  Controlled  Patient is at goal today   I have reviewed lifestyle modification to achieve/maintain goals  We will continue the current medication regimen as listed below  Patient will follow up in 6 months   -     spironolactone (ALDACTONE) 100 MG tablet; Take 1 tablet (100 mg total) by mouth once daily.  -     losartan (COZAAR) 100 MG tablet; Take 1 tablet (100 mg total) by mouth once daily.  -     Basic Metabolic Panel; Standing  -     Hemoglobin A1C; Standing  -     Hepatic Function Panel; Standing  -     Lipid Panel; Standing  -     Uric Acid; Standing    Prediabetes  -     Basic Metabolic Panel; Standing  -     Hemoglobin A1C; Standing  -     Hepatic Function Panel; Standing  -     Lipid Panel; Standing  -     Uric Acid; Standing    Other secondary chronic gout of multiple sites without tophus  -     allopurinoL (ZYLOPRIM) 300 MG tablet; Take 1 tablet (300 mg total) by mouth once daily.    Gastroesophageal reflux disease, unspecified whether esophagitis present  -     pantoprazole (PROTONIX) 40 MG tablet; Take 1 tablet (40 mg total) by mouth every morning.    Pure hypertriglyceridemia  New  Uncontrolled  Check CAC score  -     CT Cardiac Scoring; Future  -     Lipids Digital Medicine (LDMP) Enrollment Order  -     Lipids Digital Medicine (LDMP): Assign Onboarding Questionnaires    Family history of early CAD          Sister with early CAD  Coronary calcium score to help decided on medcation  Labs in 6 month with OV HTN       Subjective:       Patient ID: José Miguel Meng is a 48 y.o.  "female.    Chief Complaint: Follow-up        Interim Hx  Last seen by me in August    Concerns today  Elevated cholestrol     Chronic problems       Hypertension  This is a chronic problem. The current episode started more than 1 year ago. The problem has been waxing and waning since onset. Past treatments include angiotensin blockers (MRA).      Last 5 Patient Entered Readings                Current 30 Day Average: 124/87  Recent Readings 11/29/2023 11/29/2023 11/20/2023 11/20/2023 11/8/2023   SBP (mmHg) 127 116 118 110 134   DBP (mmHg) 93 82 83 78 91   Pulse 98 93 99 99 83                 Review of Systems   All other systems reviewed and are negative.            Health Maintenance Due   Topic Date Due    COVID-19 Vaccine (1) Never done    Influenza Vaccine (1) 09/01/2023         Objective:     /84 (BP Location: Right arm, Patient Position: Sitting, BP Method: Large (Manual))   Pulse 85   Ht 5' 8" (1.727 m)   Wt 88.5 kg (195 lb 1.7 oz)   LMP 04/07/2016 (Exact Date)   SpO2 100%   BMI 29.67 kg/m²         12/5/2023     9:11 AM 8/31/2023     9:01 AM 8/28/2023     1:20 PM 7/6/2023     8:02 AM 6/12/2023     8:19 AM   Vitals   Height 5' 8" (1.727 m) 5' 8" (1.727 m)  5' 8" (1.727 m) 5' 8" (1.727 m)   Weight (lbs) 195.11 204.59 201.72 213 213   BMI (kg/m2) 29.7 31.1  32.4 32.4                Physical Exam  Vitals and nursing note reviewed.   Constitutional:       General: She is not in acute distress.     Appearance: She is well-developed. She is not diaphoretic.   HENT:      Head: Normocephalic.      Nose: Nose normal.   Eyes:      General:         Right eye: No discharge.         Left eye: No discharge.      Conjunctiva/sclera: Conjunctivae normal.      Pupils: Pupils are equal, round, and reactive to light.   Cardiovascular:      Rate and Rhythm: Normal rate and regular rhythm.      Heart sounds: Normal heart sounds. No murmur heard.     No friction rub. No gallop.   Pulmonary:      Effort: Pulmonary effort " is normal. No respiratory distress.   Abdominal:      General: Bowel sounds are normal. There is no distension.      Palpations: Abdomen is soft.   Musculoskeletal:         General: No deformity. Normal range of motion.      Cervical back: Normal range of motion.   Skin:     General: Skin is warm.   Neurological:      Mental Status: She is alert and oriented to person, place, and time.      Cranial Nerves: No cranial nerve deficit.           The 10-year ASCVD risk score (Lavonne MO, et al., 2019) is: 2%    Values used to calculate the score:      Age: 48 years      Sex: Female      Is Non- : Yes      Diabetic: No      Tobacco smoker: No      Systolic Blood Pressure: 120 mmHg      Is BP treated: Yes      HDL Cholesterol: 64 mg/dL      Total Cholesterol: 236 mg/dL    Recent Results (from the past 336 hour(s))   Basic Metabolic Panel    Collection Time: 11/28/23  8:13 AM   Result Value Ref Range    Sodium 138 136 - 145 mmol/L    Potassium 4.3 3.5 - 5.1 mmol/L    Chloride 105 95 - 110 mmol/L    CO2 19 (L) 23 - 29 mmol/L    Glucose 96 70 - 110 mg/dL    BUN 11 7 - 17 mg/dL    Creatinine 0.97 0.50 - 1.40 mg/dL    Calcium 9.5 8.7 - 10.5 mg/dL    Anion Gap 14 8 - 16 mmol/L    eGFR >60.0 >60 mL/min/1.73 m^2   Lipid Panel    Collection Time: 11/28/23  8:13 AM   Result Value Ref Range    Cholesterol 236 (H) 120 - 199 mg/dL    Triglycerides 419 (H) 30 - 150 mg/dL    HDL 64 40 - 75 mg/dL    LDL Cholesterol Invalid, Trig>400.0 63.0 - 159.0 mg/dL    HDL/Cholesterol Ratio 27.1 20.0 - 50.0 %    Total Cholesterol/HDL Ratio 3.7 2.0 - 5.0    Non-HDL Cholesterol 172 mg/dL       Future Appointments   Date Time Provider Department Center   12/8/2023 12:00 PM [facility] OI-CT2 500 LB LIMIT [facility]   6/3/2024  8:00 AM [facility] [place]   6/5/2024  9:00 AM Steve Skelton III, MD [facility]           Medication List with Changes/Refills   Current Medications    CHLORHEXIDINE (PERIDEX)  0.12 % SOLUTION        COLCHICINE (COLCRYS) 0.6 MG TABLET    Take 1 tablet (0.6 mg total) by mouth 2 (two) times daily.    INDOMETHACIN (INDOCIN) 50 MG CAPSULE    Take 1 capsule (50 mg total) by mouth 2 (two) times daily with meals.   Changed and/or Refilled Medications    Modified Medication Previous Medication    ALLOPURINOL (ZYLOPRIM) 300 MG TABLET allopurinoL (ZYLOPRIM) 300 MG tablet       Take 1 tablet (300 mg total) by mouth once daily.    Take 1 tablet (300 mg total) by mouth once daily.    LOSARTAN (COZAAR) 100 MG TABLET losartan (COZAAR) 100 MG tablet       Take 1 tablet (100 mg total) by mouth once daily.    Take 1 tablet (100 mg total) by mouth once daily.    PANTOPRAZOLE (PROTONIX) 40 MG TABLET pantoprazole (PROTONIX) 40 MG tablet       Take 1 tablet (40 mg total) by mouth every morning.    Take 1 tablet (40 mg total) by mouth every morning.    SPIRONOLACTONE (ALDACTONE) 100 MG TABLET spironolactone (ALDACTONE) 100 MG tablet       Take 1 tablet (100 mg total) by mouth once daily.    Take 1 tablet (100 mg total) by mouth once daily.         Disclaimer:  This note has been generated using voice-recognition software. There may be grammatical or spelling errors that have been missed during proof-reading

## 2023-12-11 ENCOUNTER — HOSPITAL ENCOUNTER (OUTPATIENT)
Dept: RADIOLOGY | Facility: HOSPITAL | Age: 48
Discharge: HOME | End: 2023-12-11
Attending: INTERNAL MEDICINE
Payer: MEDICAID

## 2023-12-11 DIAGNOSIS — E78.1 PURE HYPERTRIGLYCERIDEMIA: ICD-10-CM

## 2023-12-11 PROCEDURE — 75571 CT HRT W/O DYE W/CA TEST: CPT | Mod: 26,,, | Performed by: RADIOLOGY

## 2023-12-11 PROCEDURE — 75571 CT CALCIUM SCORING CARDIAC: ICD-10-PCS | Mod: 26,,, | Performed by: RADIOLOGY

## 2023-12-11 PROCEDURE — 75571 CT HRT W/O DYE W/CA TEST: CPT | Mod: TC

## 2024-05-23 DIAGNOSIS — K21.9 GASTROESOPHAGEAL REFLUX DISEASE, UNSPECIFIED WHETHER ESOPHAGITIS PRESENT: ICD-10-CM

## 2024-05-23 RX ORDER — PANTOPRAZOLE SODIUM 40 MG/1
40 TABLET, DELAYED RELEASE ORAL
Qty: 90 TABLET | Refills: 1 | OUTPATIENT
Start: 2024-05-23

## 2024-05-23 NOTE — TELEPHONE ENCOUNTER
No care due was identified.  [facility] Embedded Care Due Messages. Reference number: [id number].   5/23/2024 5:06:08 PM CDT

## 2024-05-24 NOTE — TELEPHONE ENCOUNTER
Pharmacy is requesting that a PRIOR AUTHORIZATION be completed for the PANTOPRAZOLE SOD DR 40 MG TAB . Please forward this request to the staff member handling PAs for your clinic. Thank you.      Note composed:7:03 PM 05/23/2024

## 2024-05-24 NOTE — TELEPHONE ENCOUNTER
Refill Decision Note   José Miguel Meng  is requesting a refill authorization.  Brief Assessment and Rationale for Refill:  Route     Medication Therapy Plan:  PA request will be forwarded to provider staff.       Comments:     Note composed:7:02 PM 05/23/2024

## 2024-06-05 ENCOUNTER — OFFICE VISIT (OUTPATIENT)
Dept: INTERNAL MEDICINE | Facility: CLINIC | Age: 49
End: 2024-06-05
Payer: MEDICAID

## 2024-06-05 VITALS
SYSTOLIC BLOOD PRESSURE: 104 MMHG | BODY MASS INDEX: 27.9 KG/M2 | WEIGHT: 184.06 LBS | HEART RATE: 113 BPM | OXYGEN SATURATION: 100 % | DIASTOLIC BLOOD PRESSURE: 66 MMHG | HEIGHT: 68 IN

## 2024-06-05 DIAGNOSIS — Z12.31 ENCOUNTER FOR SCREENING MAMMOGRAM FOR BREAST CANCER: ICD-10-CM

## 2024-06-05 DIAGNOSIS — E78.00 PURE HYPERCHOLESTEROLEMIA: ICD-10-CM

## 2024-06-05 DIAGNOSIS — I10 ESSENTIAL HYPERTENSION: Primary | ICD-10-CM

## 2024-06-05 DIAGNOSIS — K21.9 GASTROESOPHAGEAL REFLUX DISEASE, UNSPECIFIED WHETHER ESOPHAGITIS PRESENT: ICD-10-CM

## 2024-06-05 DIAGNOSIS — Z82.49 FAMILY HISTORY OF EARLY CAD: ICD-10-CM

## 2024-06-05 DIAGNOSIS — R73.03 PREDIABETES: ICD-10-CM

## 2024-06-05 PROCEDURE — 3008F BODY MASS INDEX DOCD: CPT | Mod: CPTII,,, | Performed by: INTERNAL MEDICINE

## 2024-06-05 PROCEDURE — 99999 PR PBB SHADOW E&M-EST. PATIENT-LVL III: CPT | Mod: PBBFAC,,, | Performed by: INTERNAL MEDICINE

## 2024-06-05 PROCEDURE — 1160F RVW MEDS BY RX/DR IN RCRD: CPT | Mod: CPTII,,, | Performed by: INTERNAL MEDICINE

## 2024-06-05 PROCEDURE — 1159F MED LIST DOCD IN RCRD: CPT | Mod: CPTII,,, | Performed by: INTERNAL MEDICINE

## 2024-06-05 PROCEDURE — 99214 OFFICE O/P EST MOD 30 MIN: CPT | Mod: S$PBB,,, | Performed by: INTERNAL MEDICINE

## 2024-06-05 PROCEDURE — 99213 OFFICE O/P EST LOW 20 MIN: CPT | Mod: PBBFAC,PO | Performed by: INTERNAL MEDICINE

## 2024-06-05 PROCEDURE — 3044F HG A1C LEVEL LT 7.0%: CPT | Mod: CPTII,,, | Performed by: INTERNAL MEDICINE

## 2024-06-05 PROCEDURE — 4010F ACE/ARB THERAPY RXD/TAKEN: CPT | Mod: CPTII,,, | Performed by: INTERNAL MEDICINE

## 2024-06-05 PROCEDURE — 3078F DIAST BP <80 MM HG: CPT | Mod: CPTII,,, | Performed by: INTERNAL MEDICINE

## 2024-06-05 PROCEDURE — 3074F SYST BP LT 130 MM HG: CPT | Mod: CPTII,,, | Performed by: INTERNAL MEDICINE

## 2024-06-05 PROCEDURE — G2211 COMPLEX E/M VISIT ADD ON: HCPCS | Mod: S$PBB,,, | Performed by: INTERNAL MEDICINE

## 2024-06-05 RX ORDER — PANTOPRAZOLE SODIUM 40 MG/1
40 TABLET, DELAYED RELEASE ORAL EVERY MORNING
Qty: 90 TABLET | Refills: 1 | Status: SHIPPED | OUTPATIENT
Start: 2024-06-05

## 2024-06-05 NOTE — PROGRESS NOTES
Assessment:         1. Essential hypertension    2. Encounter for screening mammogram for breast cancer    3. Pure hypercholesterolemia    4. Prediabetes    5. Family history of early CAD    6. Gastroesophageal reflux disease, unspecified whether esophagitis present          Plan:           José Miguel was seen today for follow-up.    Diagnoses and all orders for this visit:    Essential hypertension  Chronic  Controlled  Patient is at goal today   I have reviewed lifestyle modification to achieve/maintain goals  We will continue the current medication regimen as listed below  Patient will follow up in 6 months   -     Basic Metabolic Panel; Standing    Encounter for screening mammogram for breast cancer  -     Mammo Digital Screening Bilat w/ [facility]; Future    Pure hypercholesterolemia  -     Hepatic Function Panel; Standing  -     Lipid Panel; Standing    Prediabetes  -     Hemoglobin A1C; Standing    Family history of early CAD    Gastroesophageal reflux disease, unspecified whether esophagitis present    -     pantoprazole (PROTONIX) 40 MG tablet; Take 1 tablet (40 mg total) by mouth every morning.          GERD   Prior auth- ? Not seen in epic -  Send to [facility] to scheduled  Labs :)  Fu in 6 month with prelabs         Subjective:       Patient ID: José Miguel Meng is a 48 y.o. female.    Chief Complaint: Follow-up      Interim Hx  Last seen by me in [facility]  Has been following with [facility]     Concerns today  Prior auth- for ppi     Chronic problems      Hypertension  This is a chronic problem. The current episode started more than 1 year ago. The problem has been waxing and waning since onset. Past treatments include angiotensin blockers (aldactone).   Hyperlipidemia  This is a chronic problem. The current episode started more than 1 year ago. The problem is uncontrolled. She is currently on no antihyperlipidemic treatment.       Review of Systems   All other systems reviewed and are  "negative.            Health Maintenance Due   Topic Date Due    COVID-19 Vaccine (1 - 2023-24 season) Never done    Mammogram  07/06/2024         Objective:     /66 (BP Location: Right arm, Patient Position: Sitting, BP Method: Large (Manual))   Pulse (!) 113   Ht 5' 8" (1.727 m)   Wt 83.5 kg (184 lb 1.4 oz)   LMP 04/07/2016 (Exact Date)   SpO2 100%   BMI 27.99 kg/m²         6/5/2024     8:51 AM 12/5/2023     9:11 AM 8/31/2023     9:01 AM 8/28/2023     1:20 PM 7/6/2023     8:02 AM   Vitals   Height 5' 8" (1.727 m) 5' 8" (1.727 m) 5' 8" (1.727 m)  5' 8" (1.727 m)   Weight (lbs) 184.08 195.11 204.59 201.72 213   BMI (kg/m2) 28 29.7 31.1  32.4              Physical Exam  Vitals and nursing note reviewed.   Constitutional:       General: She is not in acute distress.     Appearance: She is well-developed. She is not diaphoretic.   HENT:      Head: Normocephalic.      Nose: Nose normal.   Eyes:      General:         Right eye: No discharge.         Left eye: No discharge.      Conjunctiva/sclera: Conjunctivae normal.      Pupils: Pupils are equal, round, and reactive to light.   Cardiovascular:      Rate and Rhythm: Normal rate and regular rhythm.      Heart sounds: Normal heart sounds. No murmur heard.     No friction rub. No gallop.   Pulmonary:      Effort: Pulmonary effort is normal. No respiratory distress.   Abdominal:      General: Bowel sounds are normal. There is no distension.      Palpations: Abdomen is soft.   Musculoskeletal:         General: No deformity. Normal range of motion.      Cervical back: Normal range of motion.   Skin:     General: Skin is warm.   Neurological:      Mental Status: She is alert and oriented to person, place, and time.      Cranial Nerves: No cranial nerve deficit.           Recent Results (from the past 336 hour(s))   Basic Metabolic Panel    Collection Time: 06/03/24  8:09 AM   Result Value Ref Range    Sodium 135 (L) 136 - 145 mmol/L    Potassium 4.0 3.5 - 5.1 mmol/L "    Chloride 103 95 - 110 mmol/L    CO2 20 (L) 23 - 29 mmol/L    Glucose 77 70 - 110 mg/dL    BUN 9 6 - 20 mg/dL    Creatinine 1.0 0.5 - 1.4 mg/dL    Calcium 9.3 8.7 - 10.5 mg/dL    Anion Gap 12 8 - 16 mmol/L    eGFR >60.0 >60 mL/min/1.73 m^2   Hemoglobin A1C    Collection Time: 06/03/24  8:09 AM   Result Value Ref Range    Hemoglobin A1C 5.0 4.0 - 5.6 %    Estimated Avg Glucose 97 68 - 131 mg/dL   Hepatic Function Panel    Collection Time: 06/03/24  8:09 AM   Result Value Ref Range    Total Protein 7.1 6.0 - 8.4 g/dL    Albumin 3.5 3.5 - 5.2 g/dL    Total Bilirubin 0.3 0.1 - 1.0 mg/dL    Bilirubin, Direct 0.2 0.1 - 0.3 mg/dL    AST 24 10 - 40 U/L    ALT 12 10 - 44 U/L    Alkaline Phosphatase 42 (L) 55 - 135 U/L   Lipid Panel    Collection Time: 06/03/24  8:09 AM   Result Value Ref Range    Cholesterol 228 (H) 120 - 199 mg/dL    Triglycerides 437 (H) 30 - 150 mg/dL    HDL 60 40 - 75 mg/dL    LDL Cholesterol Invalid, Trig>400.0 63.0 - 159.0 mg/dL    HDL/Cholesterol Ratio 26.3 20.0 - 50.0 %    Total Cholesterol/HDL Ratio 3.8 2.0 - 5.0    Non-HDL Cholesterol 168 mg/dL   Uric Acid    Collection Time: 06/03/24  8:09 AM   Result Value Ref Range    Uric Acid 1.4 (L) 2.4 - 5.7 mg/dL         Future Appointments   Date Time Provider Department Center   7/8/2024 11:00 AM [facility]   12/5/2024  8:00 AM [facility] [place]   1/14/2025 11:00 AM Steve Skelton III, MD [facility]         Medication List with Changes/Refills   Current Medications    ALLOPURINOL (ZYLOPRIM) 300 MG TABLET    Take 1 tablet (300 mg total) by mouth once daily.    CHLORHEXIDINE (PERIDEX) 0.12 % SOLUTION        COLCHICINE (COLCRYS) 0.6 MG TABLET    Take 1 tablet (0.6 mg total) by mouth 2 (two) times daily.    INDOMETHACIN (INDOCIN) 50 MG CAPSULE    Take 1 capsule (50 mg total) by mouth 2 (two) times daily with meals.    LOSARTAN (COZAAR) 100 MG TABLET    Take 1 tablet (100 mg total) by mouth once daily.     SPIRONOLACTONE (ALDACTONE) 100 MG TABLET    Take 1 tablet (100 mg total) by mouth once daily.   Changed and/or Refilled Medications    Modified Medication Previous Medication    PANTOPRAZOLE (PROTONIX) 40 MG TABLET pantoprazole (PROTONIX) 40 MG tablet       Take 1 tablet (40 mg total) by mouth every morning.    Take 1 tablet (40 mg total) by mouth every morning.         Disclaimer:  This note has been generated using voice-recognition software. There may be grammatical or spelling errors that have been missed during proof-reading Visit complexity inherent to evaluation and management associated with medical care services that serve as the continuing focal point for all needed health care services and/or with medical care services that are part of ongoing care related to a patient's single, serious condition or a complex condition

## 2024-07-10 DIAGNOSIS — M10.9 GOUT, UNSPECIFIED CAUSE, UNSPECIFIED CHRONICITY, UNSPECIFIED SITE: ICD-10-CM

## 2024-07-10 DIAGNOSIS — M17.12 PRIMARY OSTEOARTHRITIS OF LEFT KNEE: ICD-10-CM

## 2024-07-11 RX ORDER — COLCHICINE 0.6 MG/1
0.6 TABLET ORAL 2 TIMES DAILY
Qty: 180 TABLET | Refills: 3 | Status: SHIPPED | OUTPATIENT
Start: 2024-07-11

## 2024-07-11 RX ORDER — INDOMETHACIN 50 MG/1
50 CAPSULE ORAL 2 TIMES DAILY WITH MEALS
Qty: 60 CAPSULE | Refills: 2 | OUTPATIENT
Start: 2024-07-11

## 2024-07-11 NOTE — TELEPHONE ENCOUNTER
No care due was identified.  [facility] Embedded Care Due Messages. Reference number: [id number].   7/10/2024 7:43:30 PM CDT

## 2024-07-11 NOTE — TELEPHONE ENCOUNTER
Refill Routing Note   Medication(s) are not appropriate for processing by [facility] for the following reason(s):        New or recently adjusted medication    [facility] action(s):  Defer               Appointments  past 12m or future 3m with PCP    Date Provider   Last Visit   6/5/2024 Steve Skelton III, MD   Next Visit   Visit date not found Steve Skelton III, MD   ED visits in past 90 days: 0        Note composed:7:18 AM 07/11/2024

## 2024-07-21 DIAGNOSIS — I10 ESSENTIAL HYPERTENSION: ICD-10-CM

## 2024-07-21 RX ORDER — LOSARTAN POTASSIUM 100 MG/1
100 TABLET ORAL
Qty: 90 TABLET | Refills: 3 | Status: SHIPPED | OUTPATIENT
Start: 2024-07-21

## 2024-07-21 NOTE — TELEPHONE ENCOUNTER
Refill Decision Note   José Miguel Meng  is requesting a refill authorization.  Brief Assessment and Rationale for Refill:  Approve     Medication Therapy Plan:         Comments:     Note composed:2:15 AM 07/21/2024

## 2024-07-25 DIAGNOSIS — M1A.49X0 OTHER SECONDARY CHRONIC GOUT OF MULTIPLE SITES WITHOUT TOPHUS: ICD-10-CM

## 2024-07-25 RX ORDER — ALLOPURINOL 300 MG/1
300 TABLET ORAL
Qty: 90 TABLET | Refills: 0 | Status: SHIPPED | OUTPATIENT
Start: 2024-07-25

## 2024-07-25 NOTE — TELEPHONE ENCOUNTER
Refill Decision Note   José Miguel Meng  is requesting a refill authorization.  Brief Assessment and Rationale for Refill:  Approve     Medication Therapy Plan:         Comments:     Note composed:4:21 PM 07/25/2024

## 2024-07-25 NOTE — TELEPHONE ENCOUNTER
No care due was identified.  [facility] Embedded Care Due Messages. Reference number: [id number].   7/25/2024 10:30:08 AM CDT

## 2024-09-27 DIAGNOSIS — M1A.49X0 OTHER SECONDARY CHRONIC GOUT OF MULTIPLE SITES WITHOUT TOPHUS: ICD-10-CM

## 2024-09-27 NOTE — TELEPHONE ENCOUNTER
Care Due:                  Date            Visit Type   Department     Provider  --------------------------------------------------------------------------------                                EP -                              PRIMARY      [facility] INTERNAL  Last Visit: 06-      CARE ([facility])   MEDICINE       Steve Seklton                               -                              PRIMARY      [facility] INTERNAL  Next Visit: 01-      CARE ([facility])   MEDICINE       Steve Skelton                                                            Last  Test          Frequency    Reason                     Performed    Due Date  --------------------------------------------------------------------------------    CBC.........  12 months..  allopurinoL..............  08- 08-    [facility] Embedded Care Due Messages. Reference number: [id number].   9/27/2024 4:46:44 PM CDT

## 2024-09-28 NOTE — TELEPHONE ENCOUNTER
Refill Routing Note   Medication(s) are not appropriate for processing by [facility] for the following reason(s):        Required labs outdated    [facility] action(s):  Defer     Requires labs : Yes             Appointments  past 12m or future 3m with PCP    Date Provider   Last Visit   6/5/2024 Steve Skelton III, MD   Next Visit   1/14/2025 Steve Skelton III, MD   ED visits in past 90 days: 0        Note composed:10:15 AM 09/28/2024

## 2024-09-30 RX ORDER — ALLOPURINOL 300 MG/1
300 TABLET ORAL
Qty: 90 TABLET | Refills: 0 | Status: SHIPPED | OUTPATIENT
Start: 2024-09-30

## 2024-11-09 ENCOUNTER — HOSPITAL ENCOUNTER (INPATIENT)
Facility: HOSPITAL | Age: 49
LOS: 3 days | Discharge: HOME | DRG: 281 | End: 2024-11-12
Attending: INTERNAL MEDICINE | Admitting: INTERNAL MEDICINE
Payer: MEDICAID

## 2024-11-09 DIAGNOSIS — R07.9 CHEST PAIN: ICD-10-CM

## 2024-11-09 DIAGNOSIS — I21.4 NSTEMI (NON-ST ELEVATED MYOCARDIAL INFARCTION): ICD-10-CM

## 2024-11-09 LAB
APTT PPP: 57.5 SEC (ref 21–32)
BASOPHILS # BLD AUTO: 0.02 K/UL (ref 0–0.2)
BASOPHILS NFR BLD: 0.3 % (ref 0–1.9)
DIFFERENTIAL METHOD BLD: ABNORMAL
EOSINOPHIL # BLD AUTO: 0.1 K/UL (ref 0–0.5)
EOSINOPHIL NFR BLD: 1.8 % (ref 0–8)
ERYTHROCYTE [DISTWIDTH] IN BLOOD BY AUTOMATED COUNT: 12.4 % (ref 11.5–14.5)
HCT VFR BLD AUTO: 32.4 % (ref 37–48.5)
HGB BLD-MCNC: 11.3 G/DL (ref 12–16)
IMM GRANULOCYTES # BLD AUTO: 0.02 K/UL (ref 0–0.04)
IMM GRANULOCYTES NFR BLD AUTO: 0.3 % (ref 0–0.5)
INR PPP: 1 (ref 0.8–1.2)
LYMPHOCYTES # BLD AUTO: 2.9 K/UL (ref 1–4.8)
LYMPHOCYTES NFR BLD: 44.5 % (ref 18–48)
MCH RBC QN AUTO: 33 PG (ref 27–31)
MCHC RBC AUTO-ENTMCNC: 34.9 G/DL (ref 32–36)
MCV RBC AUTO: 95 FL (ref 82–98)
MONOCYTES # BLD AUTO: 0.3 K/UL (ref 0.3–1)
MONOCYTES NFR BLD: 4.3 % (ref 4–15)
NEUTROPHILS # BLD AUTO: 3.2 K/UL (ref 1.8–7.7)
NEUTROPHILS NFR BLD: 48.8 % (ref 38–73)
NRBC BLD-RTO: 0 /100 WBC
PLATELET # BLD AUTO: 241 K/UL (ref 150–450)
PMV BLD AUTO: 9.7 FL (ref 9.2–12.9)
PROTHROMBIN TIME: 11.1 SEC (ref 9–12.5)
RBC # BLD AUTO: 3.42 M/UL (ref 4–5.4)
WBC # BLD AUTO: 6.58 K/UL (ref 3.9–12.7)

## 2024-11-09 PROCEDURE — 36415 COLL VENOUS BLD VENIPUNCTURE: CPT | Performed by: INTERNAL MEDICINE

## 2024-11-09 PROCEDURE — 85025 COMPLETE CBC W/AUTO DIFF WBC: CPT | Mod: 91 | Performed by: INTERNAL MEDICINE

## 2024-11-09 PROCEDURE — 11000001 HC ACUTE MED/SURG PRIVATE ROOM

## 2024-11-09 PROCEDURE — 85730 THROMBOPLASTIN TIME PARTIAL: CPT | Mod: 91 | Performed by: INTERNAL MEDICINE

## 2024-11-09 PROCEDURE — 85610 PROTHROMBIN TIME: CPT | Mod: 91 | Performed by: INTERNAL MEDICINE

## 2024-11-09 PROCEDURE — 84484 ASSAY OF TROPONIN QUANT: CPT | Mod: 91 | Performed by: INTERNAL MEDICINE

## 2024-11-09 RX ORDER — HEPARIN SODIUM,PORCINE/D5W 25000/250
0-40 INTRAVENOUS SOLUTION INTRAVENOUS CONTINUOUS
Status: DISCONTINUED | OUTPATIENT
Start: 2024-11-09 | End: 2024-11-11

## 2024-11-09 RX ORDER — PANTOPRAZOLE SODIUM 40 MG/1
40 TABLET, DELAYED RELEASE ORAL EVERY MORNING
Status: DISCONTINUED | OUTPATIENT
Start: 2024-11-10 | End: 2024-11-12 | Stop reason: HOSPADM

## 2024-11-09 RX ORDER — ONDANSETRON HYDROCHLORIDE 2 MG/ML
4 INJECTION, SOLUTION INTRAVENOUS EVERY 8 HOURS PRN
Status: DISCONTINUED | OUTPATIENT
Start: 2024-11-09 | End: 2024-11-12 | Stop reason: HOSPADM

## 2024-11-09 RX ORDER — ALLOPURINOL 100 MG/1
300 TABLET ORAL DAILY
Status: DISCONTINUED | OUTPATIENT
Start: 2024-11-10 | End: 2024-11-12 | Stop reason: HOSPADM

## 2024-11-09 RX ORDER — SPIRONOLACTONE 25 MG/1
100 TABLET ORAL DAILY
Status: DISCONTINUED | OUTPATIENT
Start: 2024-11-10 | End: 2024-11-12

## 2024-11-09 RX ORDER — ALUMINUM HYDROXIDE, MAGNESIUM HYDROXIDE, AND SIMETHICONE 1200; 120; 1200 MG/30ML; MG/30ML; MG/30ML
30 SUSPENSION ORAL
Status: DISCONTINUED | OUTPATIENT
Start: 2024-11-10 | End: 2024-11-09

## 2024-11-09 RX ORDER — NAPROXEN SODIUM 220 MG/1
81 TABLET, FILM COATED ORAL DAILY
Status: DISCONTINUED | OUTPATIENT
Start: 2024-11-10 | End: 2024-11-12 | Stop reason: HOSPADM

## 2024-11-09 RX ORDER — ALUMINUM HYDROXIDE, MAGNESIUM HYDROXIDE, AND SIMETHICONE 1200; 120; 1200 MG/30ML; MG/30ML; MG/30ML
30 SUSPENSION ORAL
Status: DISCONTINUED | OUTPATIENT
Start: 2024-11-10 | End: 2024-11-12 | Stop reason: HOSPADM

## 2024-11-09 RX ORDER — LOSARTAN POTASSIUM 50 MG/1
100 TABLET ORAL DAILY
Status: DISCONTINUED | OUTPATIENT
Start: 2024-11-10 | End: 2024-11-10

## 2024-11-09 RX ORDER — NALOXONE HCL 0.4 MG/ML
0.02 VIAL (ML) INJECTION
Status: DISCONTINUED | OUTPATIENT
Start: 2024-11-09 | End: 2024-11-12 | Stop reason: HOSPADM

## 2024-11-09 NOTE — Clinical Note
The left ventricle was injected. The injected rate was 15 mL/sec. The total injected volume was 30 mL.

## 2024-11-09 NOTE — Clinical Note
The catheter was repositioned into the ostium   left main. An angiography was performed of the left coronary arteries. Multiple views were taken. The angiography was performed via hand injection with 40 mL of contrast.

## 2024-11-10 PROBLEM — I21.4 NSTEMI (NON-ST ELEVATED MYOCARDIAL INFARCTION): Status: ACTIVE | Noted: 2024-11-10

## 2024-11-10 PROBLEM — E78.5 HYPERLIPIDEMIA: Chronic | Status: ACTIVE | Noted: 2024-11-10

## 2024-11-10 LAB
APTT PPP: 46.6 SEC (ref 21–32)
APTT PPP: 48.7 SEC (ref 21–32)
BASOPHILS # BLD AUTO: 0.05 K/UL (ref 0–0.2)
BASOPHILS NFR BLD: 0.7 % (ref 0–1.9)
CHOLEST SERPL-MCNC: 235 MG/DL (ref 120–199)
CHOLEST/HDLC SERPL: 2.2 {RATIO} (ref 2–5)
DIFFERENTIAL METHOD BLD: ABNORMAL
EOSINOPHIL # BLD AUTO: 0.2 K/UL (ref 0–0.5)
EOSINOPHIL NFR BLD: 2.5 % (ref 0–8)
ERYTHROCYTE [DISTWIDTH] IN BLOOD BY AUTOMATED COUNT: 12.7 % (ref 11.5–14.5)
HCT VFR BLD AUTO: 31 % (ref 37–48.5)
HDLC SERPL-MCNC: 107 MG/DL (ref 40–75)
HDLC SERPL: 45.5 % (ref 20–50)
HGB BLD-MCNC: 10.6 G/DL (ref 12–16)
IMM GRANULOCYTES # BLD AUTO: 0.02 K/UL (ref 0–0.04)
IMM GRANULOCYTES NFR BLD AUTO: 0.3 % (ref 0–0.5)
LDLC SERPL CALC-MCNC: 100.4 MG/DL (ref 63–159)
LYMPHOCYTES # BLD AUTO: 3.1 K/UL (ref 1–4.8)
LYMPHOCYTES NFR BLD: 40.5 % (ref 18–48)
MCH RBC QN AUTO: 32.7 PG (ref 27–31)
MCHC RBC AUTO-ENTMCNC: 34.2 G/DL (ref 32–36)
MCV RBC AUTO: 96 FL (ref 82–98)
MONOCYTES # BLD AUTO: 0.5 K/UL (ref 0.3–1)
MONOCYTES NFR BLD: 6.1 % (ref 4–15)
NEUTROPHILS # BLD AUTO: 3.8 K/UL (ref 1.8–7.7)
NEUTROPHILS NFR BLD: 49.9 % (ref 38–73)
NONHDLC SERPL-MCNC: 128 MG/DL
NRBC BLD-RTO: 0 /100 WBC
PLATELET # BLD AUTO: 228 K/UL (ref 150–450)
PMV BLD AUTO: 10.3 FL (ref 9.2–12.9)
RBC # BLD AUTO: 3.24 M/UL (ref 4–5.4)
TRIGL SERPL-MCNC: 138 MG/DL (ref 30–150)
TROPONIN I SERPL DL<=0.01 NG/ML-MCNC: 0.17 NG/ML (ref 0–0.03)
TROPONIN I SERPL DL<=0.01 NG/ML-MCNC: 0.19 NG/ML (ref 0–0.03)
WBC # BLD AUTO: 7.65 K/UL (ref 3.9–12.7)

## 2024-11-10 PROCEDURE — 93005 ELECTROCARDIOGRAM TRACING: CPT

## 2024-11-10 PROCEDURE — 63600175 PHARM REV CODE 636 W HCPCS: Performed by: INTERNAL MEDICINE

## 2024-11-10 PROCEDURE — 80061 LIPID PANEL: CPT | Performed by: INTERNAL MEDICINE

## 2024-11-10 PROCEDURE — 85730 THROMBOPLASTIN TIME PARTIAL: CPT | Performed by: INTERNAL MEDICINE

## 2024-11-10 PROCEDURE — 99223 1ST HOSP IP/OBS HIGH 75: CPT | Mod: ,,, | Performed by: INTERNAL MEDICINE

## 2024-11-10 PROCEDURE — 25000003 PHARM REV CODE 250: Performed by: INTERNAL MEDICINE

## 2024-11-10 PROCEDURE — 84484 ASSAY OF TROPONIN QUANT: CPT | Performed by: INTERNAL MEDICINE

## 2024-11-10 PROCEDURE — 25000242 PHARM REV CODE 250 ALT 637 W/ HCPCS: Performed by: INTERNAL MEDICINE

## 2024-11-10 PROCEDURE — 11000001 HC ACUTE MED/SURG PRIVATE ROOM

## 2024-11-10 PROCEDURE — 85025 COMPLETE CBC W/AUTO DIFF WBC: CPT | Performed by: INTERNAL MEDICINE

## 2024-11-10 PROCEDURE — 36415 COLL VENOUS BLD VENIPUNCTURE: CPT | Performed by: INTERNAL MEDICINE

## 2024-11-10 PROCEDURE — 99900035 HC TECH TIME PER 15 MIN (STAT)

## 2024-11-10 PROCEDURE — 93010 ELECTROCARDIOGRAM REPORT: CPT | Mod: ,,, | Performed by: INTERNAL MEDICINE

## 2024-11-10 PROCEDURE — 25000003 PHARM REV CODE 250

## 2024-11-10 RX ORDER — MUPIROCIN 20 MG/G
OINTMENT TOPICAL 2 TIMES DAILY
Status: DISCONTINUED | OUTPATIENT
Start: 2024-11-10 | End: 2024-11-12 | Stop reason: HOSPADM

## 2024-11-10 RX ORDER — METOPROLOL TARTRATE 25 MG/1
12.5 TABLET ORAL 2 TIMES DAILY
Status: DISCONTINUED | OUTPATIENT
Start: 2024-11-10 | End: 2024-11-11

## 2024-11-10 RX ORDER — NITROGLYCERIN 0.4 MG/1
0.4 TABLET SUBLINGUAL EVERY 5 MIN PRN
Status: DISCONTINUED | OUTPATIENT
Start: 2024-11-10 | End: 2024-11-12 | Stop reason: HOSPADM

## 2024-11-10 RX ORDER — ATORVASTATIN CALCIUM 40 MG/1
40 TABLET, FILM COATED ORAL NIGHTLY
Status: DISCONTINUED | OUTPATIENT
Start: 2024-11-10 | End: 2024-11-12 | Stop reason: HOSPADM

## 2024-11-10 RX ORDER — CLOPIDOGREL BISULFATE 75 MG/1
75 TABLET ORAL DAILY
Status: DISCONTINUED | OUTPATIENT
Start: 2024-11-10 | End: 2024-11-12 | Stop reason: HOSPADM

## 2024-11-10 RX ORDER — LORAZEPAM 0.5 MG/1
0.5 TABLET ORAL EVERY 6 HOURS PRN
Status: DISCONTINUED | OUTPATIENT
Start: 2024-11-10 | End: 2024-11-12 | Stop reason: HOSPADM

## 2024-11-10 RX ADMIN — ALUMINUM HYDROXIDE, MAGNESIUM HYDROXIDE, AND SIMETHICONE 30 ML: 200; 200; 20 SUSPENSION ORAL at 06:11

## 2024-11-10 RX ADMIN — MUPIROCIN: 20 OINTMENT TOPICAL at 10:11

## 2024-11-10 RX ADMIN — ALUMINUM HYDROXIDE, MAGNESIUM HYDROXIDE, AND SIMETHICONE 30 ML: 200; 200; 20 SUSPENSION ORAL at 11:11

## 2024-11-10 RX ADMIN — NITROGLYCERIN 0.4 MG: 0.4 TABLET, ORALLY DISINTEGRATING SUBLINGUAL at 12:11

## 2024-11-10 RX ADMIN — LORAZEPAM 0.5 MG: 0.5 TABLET ORAL at 01:11

## 2024-11-10 RX ADMIN — METOPROLOL TARTRATE 12.5 MG: 25 TABLET, FILM COATED ORAL at 08:11

## 2024-11-10 RX ADMIN — ALUMINUM HYDROXIDE, MAGNESIUM HYDROXIDE, AND SIMETHICONE 30 ML: 200; 200; 20 SUSPENSION ORAL at 04:11

## 2024-11-10 RX ADMIN — CLOPIDOGREL BISULFATE 75 MG: 75 TABLET ORAL at 10:11

## 2024-11-10 RX ADMIN — LORAZEPAM 0.5 MG: 0.5 TABLET ORAL at 10:11

## 2024-11-10 RX ADMIN — METOPROLOL TARTRATE 12.5 MG: 25 TABLET, FILM COATED ORAL at 04:11

## 2024-11-10 RX ADMIN — SPIRONOLACTONE 100 MG: 25 TABLET ORAL at 10:11

## 2024-11-10 RX ADMIN — ASPIRIN 81 MG CHEWABLE TABLET 81 MG: 81 TABLET CHEWABLE at 10:11

## 2024-11-10 RX ADMIN — MUPIROCIN: 20 OINTMENT TOPICAL at 08:11

## 2024-11-10 RX ADMIN — HEPARIN SODIUM 12 UNITS/KG/HR: 10000 INJECTION, SOLUTION INTRAVENOUS at 04:11

## 2024-11-10 RX ADMIN — ALLOPURINOL 300 MG: 100 TABLET ORAL at 10:11

## 2024-11-10 RX ADMIN — PANTOPRAZOLE SODIUM 40 MG: 40 TABLET, DELAYED RELEASE ORAL at 06:11

## 2024-11-10 RX ADMIN — ATORVASTATIN CALCIUM 40 MG: 40 TABLET, FILM COATED ORAL at 08:11

## 2024-11-10 RX ADMIN — ALUMINUM HYDROXIDE, MAGNESIUM HYDROXIDE, AND SIMETHICONE 30 ML: 200; 200; 20 SUSPENSION ORAL at 08:11

## 2024-11-10 RX ADMIN — ATORVASTATIN CALCIUM 40 MG: 40 TABLET, FILM COATED ORAL at 04:11

## 2024-11-10 NOTE — PROGRESS NOTES
[facility] Medicine  Progress Note    Patient Name: José Miguel Meng  MRN: [id number]  Patient Class: IP- Inpatient   Admission Date: 11/9/2024  Length of Stay: 1 days  Attending Physician: Donaldo Fitzgerald MD  Primary Care Provider: Steve Skelton III, MD        Subjective:     Principal Problem:NSTEMI (non-ST elevated myocardial infarction)        HPI:  49-year-old  female with significant past medical history of hypertension that was transferred from [place] emergency room with complaint of midsternal chest pain that began yesterday and has been constant ever since.  She also had 1 episode of nausea and vomiting yesterday but none today.  She has not been worked up in the past for myocardial infarction.  She has no prior history of MI or coronary artery disease.  She does have acid reflux for which she takes Protonix but the chest pain feels different , more like pressure.  She also admits to episodes of lightheadedness and dizziness but denies that currently. She admits to being extremely anxious and has been going through lot of stress lately.  Her chest pain was  a 9/10 at presentation but is currently resolved. The pain does not radiate. She has taken ibuprofen on 2 occasions since the symptoms began and that does help but very little. There are no exacerbating factors. She denies any shortness for breath, left arm pain, jaw pain, current nausea. She denies any history of DVT/PE, recent surgery, recent travel, hormone use, cough, hemoptysis, unilateral leg swelling/redness/warmth /tenderness.   EKG from the pharynx center showed nonspecific ST changes and troponin was elevated at 0.355.      Overview/Hospital Course:  No notes on file    Interval History:  Currently patient alert and oriented x4 free of chest pain denies any shortness of breath no nausea vomiting or diarrhea patient will be NPO at midnight for cardiac catheterization on Monday 11/11.  Currently on  heparin drip with DAPT.    Review of Systems  Constitutional: neg for fever or chills  Eyes: neg for visual changes  ENT: neg for nasal congestion or sore throat  Respiratory: neg for cough or shortness of breath  Cardiovascular: neg for chest pain or palpitations  Gastrointestinal: neg for nausea or vomiting or abdominal pain. Neg for change in bowel habits  Genitourinary: neg for hematuria or dysuria  Integument/Breast: neg for rash or pruritis  Hematologic/Lymphatic: neg for easy bruising neg for lymphadenopathy   Musculoskeletal: neg for arthralgias or myalgias  Neurological: neg for seizures or tremors  Endocrine: neg for heat or cold intolerance  Objective:     Vital Signs (Most Recent):  Temp: 97.6 °F (36.4 °C) (11/10/24 0750)  Pulse: 88 (11/10/24 0750)  Resp: 17 (11/10/24 0750)  BP: 105/77 (11/10/24 0750)  SpO2: 100 % (11/10/24 0750) Vital Signs (24h Range):  Temp:  [97.4 °F (36.3 °C)-98.3 °F (36.8 °C)] 97.6 °F (36.4 °C)  Pulse:  [] 88  Resp:  [10-25] 17  SpO2:  [97 %-100 %] 100 %  BP: (102-124)/(71-88) 105/77     Weight: 74.7 kg (164 lb 10.9 oz)  Body mass index is 25.04 kg/m².    Intake/Output Summary (Last 24 hours) at 11/10/2024 1057  Last data filed at 11/10/2024 0858  Gross per 24 hour   Intake 838 ml   Output --   Net 838 ml         Physical Exam      General: well developed, well nourished, neg for acute distress  Eyes: conjunctivae/corneas clear.   Head: normocephalic, atraumatic.   Neck: supple, symmetrical, trachea midline, neg for JVD, neg for carotid bruits  Lungs: clear to auscultation bilaterally, normal respiratory effort  Heart: regular rate and rhythm, neg for murmur  Extremities: neg for edema, neg for joint swelling, pulses: 2+ at ankles   Abdomen: Soft, non-tender; liver and spleen not palpable, bowel sounds active, neg for aortic bruits  Skin: Skin color, texture, turgor normal. Neg for rashes or lesions.   Neurologic: CN II-XII grossly intact, DTR: 2/4 bilaterally. Normal muscle  strength and tone. Neg for focal numbness or weakness  Mental status: Alert, oriented x 4, affect appropriate, memory intact    Significant Labs: All pertinent labs within the past 24 hours have been reviewed.  Recent Lab Results  (Last 5 results in the past 24 hours)        11/10/24  0749   11/10/24  0240   11/09/24  2319   11/09/24  2150   11/09/24  1427        Benzodiazepines               Methadone metabolites               Phencyclidine               Amphetamines, Urine               PTT 48.7  Comment: Refer to local heparin nomogram for intensity/dose specific   therapeutic   range.  LOT^050^APTT FSL^[id number]     46.6  Comment: Refer to local heparin nomogram for intensity/dose specific   therapeutic   range.  LOT^050^APTT FSL^[id number]       57.5  Comment: Refer to local heparin nomogram for intensity/dose specific   therapeutic   range.  LOT^050^APTT FSL^[id number]     29.9  Comment: Refer to local heparin nomogram for intensity/dose specific   therapeutic   range.  LOT^050^APTT FSL^[id number]         Barbituates, Urine               Baso #   0.05     0.02         Basophil %   0.7     0.3         Cholesterol Total   235  Comment: The National Cholesterol Education Program (NCEP) has set the  following guidelines (reference ranges) for Cholesterol:  Optimal.....................<200 mg/dL  Borderline High.............200-239 mg/dL  High........................> or = 240 mg/dL               Cocaine, Urine               Urine Creatinine               Differential Method   Automated     Automated         Eos #   0.2     0.1         Eos %   2.5     1.8         Gran # (ANC)   3.8     3.2         Gran %   49.9     48.8         HDL   107  Comment: The National Cholesterol Education Program (NCEP) has set the  following guidelines (reference values) for HDL Cholesterol:  Low...............<40 mg/dL  Optimal...........>60 mg/dL               HDL/Cholesterol Ratio   45.5             Hematocrit   31.0     32.4         Hemoglobin    10.6     11.3         Immature Grans (Abs)   0.02  Comment: Mild elevation in immature granulocytes is non specific and   can be seen in a variety of conditions including stress response,   acute inflammation, trauma and pregnancy. Correlation with other   laboratory and clinical findings is essential.       0.02  Comment: Mild elevation in immature granulocytes is non specific and   can be seen in a variety of conditions including stress response,   acute inflammation, trauma and pregnancy. Correlation with other   laboratory and clinical findings is essential.           Immature Granulocytes   0.3     0.3         INR       1.0  Comment: Coumadin Therapy:  2.0 - 3.0 for INR for all indicators except mechanical heart valves  and antiphospholipid syndromes which should use 2.5 - 3.5.  LOT^040^PT Inn^[id number]     1.0  Comment: Coumadin Therapy:  2.0 - 3.0 for INR for all indicators except mechanical heart valves  and antiphospholipid syndromes which should use 2.5 - 3.5.  LOT^040^PT Inn^[id number]         LDL Cholesterol   100.4  Comment: The National Cholesterol Education Program (NCEP) has set the  following guidelines (reference values) for LDL Cholesterol:  Optimal.......................<130 mg/dL  Borderline High...............130-159 mg/dL  High..........................160-189 mg/dL  Very High.....................>190 mg/dL               Lymph #   3.1     2.9         Lymph %   40.5     44.5         MCH   32.7     33.0         MCHC   34.2     34.9         MCV   96     95         Mono #   0.5     0.3         Mono %   6.1     4.3         MPV   10.3     9.7         Non-HDL Cholesterol   128  Comment: Risk category and Non-HDL cholesterol goals:  Coronary heart disease (CHD)or equivalent (10-year risk of CHD >20%):  Non-HDL cholesterol goal     <130 mg/dL  Two or more CHD risk factors and 10-year risk of CHD <= 20%:  Non-HDL cholesterol goal     <160 mg/dL  0 to 1 CHD risk factor:  Non-HDL cholesterol goal     <190  mg/dL               nRBC   0     0         Opiates, Urine               Platelet Count   228     241         PT       11.1   10.9       RBC   3.24     3.42         RDW   12.7     12.4         Marijuana (THC) Metabolite               Total Cholesterol/HDL Ratio   2.2             Toxicology Information               Triglycerides   138  Comment: The National Cholesterol Education Program (NCEP) has set the  following guidelines (reference values) for triglycerides:  Normal......................<150 mg/dL  Borderline High.............150-199 mg/dL  High........................200-499 mg/dL               Troponin I   0.168  Comment: The reference interval for Troponin I represents the 99th percentile   cutoff   for our facility and is consistent with 3rd generation assay   performance.     0.188  Comment: The reference interval for Troponin I represents the 99th percentile   cutoff   for our facility and is consistent with 3rd generation assay   performance.             WBC   7.65     6.58                                Significant Imaging: I have reviewed all pertinent imaging results/findings within the past 24 hours.    Assessment/Plan:      * NSTEMI (non-ST elevated myocardial infarction)  -EKG showed picture of NSTEMI with elevated troponin.  -started on heparin drip and DA PT, cardiology consulted appreciate recs  -planning for cardiac angio on 11/11      Hyperlipidemia  Lab Results   Component Value Date    CHOL 235 (H) 11/10/2024    CHOL 228 (H) 06/03/2024    CHOL 236 (H) 11/28/2023     Lab Results   Component Value Date     (H) 11/10/2024    HDL 60 06/03/2024    HDL 64 11/28/2023     Lab Results   Component Value Date    LDLCALC 100.4 11/10/2024    LDLCALC Invalid, Trig>400.0 06/03/2024    LDLCALC Invalid, Trig>400.0 11/28/2023     Lab Results   Component Value Date    TRIG 138 11/10/2024    TRIG 437 (H) 06/03/2024    TRIG 419 (H) 11/28/2023     Plan:  -started her on statin  -cardiac diet  -we will  monitor closely        Chest pain  Resolved  -has nitro sublingual  -EKG shows picture of NSTEMI  -currently on heparin drip, DA PT, pending echo.  -cardiology consulted, appreciate recs  -planning for cardiac angio on 11/11      Essential hypertension  Patients blood pressure range in the last 24 hours was: BP  Min: 102/71  Max: 124/86.The patient's inpatient anti-hypertensive regimen is listed below:  Current Antihypertensives  spironolactone tablet 100 mg, Daily, Oral  nitroGLYCERIN SL tablet 0.4 mg, Every 5 min PRN, Sublingual  metoprolol tartrate (LOPRESSOR) split tablet 12.5 mg, 2 times daily, Oral    Plan  - BP is controlled, no changes needed to their regimen  - we will monitor closely      VTE Risk Mitigation (From admission, onward)           Ordered     heparin 25,000 units in dextrose 5% (100 units/ml) IV bolus from bag LOW INTENSITY nomogram - [facility]  As needed (PRN)        Question:  Heparin Infusion Adjustment (DO NOT MODIFY ANSWER)  Answer:  \\[email or web address]\epic\Images\Pharmacy\HeparinInfusions\heparin LOW INTENSITY nomogram for [facility] [id number].pdf    11/09/24 2128     heparin 25,000 units in dextrose 5% (100 units/ml) IV bolus from bag LOW INTENSITY nomogram - [facility]  As needed (PRN)        Question:  Heparin Infusion Adjustment (DO NOT MODIFY ANSWER)  Answer:  \\[email or web address]\epic\Images\Pharmacy\HeparinInfusions\heparin LOW INTENSITY nomogram for [facility] [id number].pdf    11/09/24 2128     heparin 25,000 units in dextrose 5% 250 mL (100 units/mL) infusion LOW INTENSITY nomogram - [facility]  Continuous        Question:  Begin at (units/kg/hr)  Answer:  12    11/09/24 2128     Place sequential compression device  Until discontinued         11/09/24 2128     IP VTE LOW RISK PATIENT  Once         11/09/24 2128                    Discharge Planning   DELMI:      Code Status: Full Code   Is the patient medically ready for discharge?:     Reason for patient still in hospital (select all that apply): Patient unstable and Consult  recommendations                     Donaldo Carreon MD  Department of Hospital Medicine   [facility]

## 2024-11-10 NOTE — ASSESSMENT & PLAN NOTE
Patients blood pressure range in the last 24 hours was: BP  Min: 102/71  Max: 124/86.The patient's inpatient anti-hypertensive regimen is listed below:  Current Antihypertensives  spironolactone tablet 100 mg, Daily, Oral  nitroGLYCERIN SL tablet 0.4 mg, Every 5 min PRN, Sublingual  metoprolol tartrate (LOPRESSOR) split tablet 12.5 mg, 2 times daily, Oral    Plan  - BP is controlled, no changes needed to their regimen  - we will monitor closely

## 2024-11-10 NOTE — NURSING
"Pt complained of 6/10 chest pain or chest pressure. Pt stated "not sure how to describe it". MD notified, order placed for nitroglycerin. Nitroglycerin 0.4 given. Pt states "chest feels better now 3/10". Pt believes it could be anxiety, pt states "feels scared". Lorazepam given.   "

## 2024-11-10 NOTE — NURSING
Pt arrived to the unit via stretcher on RA. Heparin gtt infusing at 12 units/kg. Aaox4 able to make needs known. Oriented to room and call light system. Denies CP at this time. Ambulated to bathroom gait steady. 115/83, Pulse 109, RR 20, sats 97% on RA. SR up x 2,call light in reach. Bed is locked in lowest position, alarm declined. Bed side handoff given to night nurse.

## 2024-11-10 NOTE — NURSING
Notified Dr. Emery on hospital medicine team of hep gtt saying to stop via the MAR. Per MD continue heparin gtt at 12 units/kg and he will be up shortly to assess pt. Night shift nurse aware.

## 2024-11-10 NOTE — NURSING
"RAPID RESPONSE NURSE PROACTIVE ROUNDING NOTE     Time of Visit: 035    Admit Date: 2024  LOS: 1  Code Status: Full Code   Date of Visit: 11/10/2024  : 1975  Age: 49 y.o.  Sex: female  Race: Black or   Bed: K457/K457 A:   MRN: [id number]  Was the patient discharged from an ICU this admission? No   Was the patient discharged from a PACU within last 24 hours?  No  Did the patient receive conscious sedation/general anesthesia in last 24 hours?  No  Was the patient in the ED within the past 24 hours?  Yes  Was the patient started on NIPPV within the past 24 hours?  No  Attending Physician: Prem Emery MD  Primary Service: Networked reference to record PCT     ASSESSMENT     Notified by bedside RN via phone call.  Reason for alert: Abnormal EKG    Diagnosis: Chest pain    Abnormal Vital Signs: /88 (Patient Position: Sitting)   Pulse 102   Temp 97.4 °F (36.3 °C) (Oral)   Resp 20   Ht 5' 8" (1.727 m)   Wt 74.7 kg (164 lb 10.9 oz)   LMP 2016 (Exact Date)   SpO2 100%   Breastfeeding No   BMI 25.04 kg/m²      Clinical Issues: Dysrythmia    Patient  has a past medical history of Hypertension.      Pt admitted on  with c/o chest pain. Pt currently on heparin gtt. Per bedside RN pt required one dose of SL Nitro and Ativan at 0100 due to chest pain/pressure and anxiety. At 0345 bedside RN contacted this RN with concerns of pt's telestrip. Upon review of strip, pts rhythm remained relatively the same. EKG was performed and sent to chart. Asked RN to reach out to primary team with results.     INTERVENTIONS/ RECOMMENDATIONS     Trend trop, currently downtrending  Monitor chest pain  Trend electrolytes  Control anxiety  Await Cardiology consult and recs    Discussed plan of care with RNVerna.    PHYSICIAN ESCALATION     Yes/No  Yes    Orders received and case discussed with Dr. Samuels .    Disposition: Remain in room 457.    FOLLOW-UP     Call back the Rapid Response " Nurse, Lupillo Daniel RN at [phone] for additional questions or concerns.

## 2024-11-10 NOTE — PLAN OF CARE
11/09/24 2032   Admission   Initial VN Admission Questions Complete   Communication Issues? None   Shift   Virtual Nurse - Rounding Complete   Pain Management Interventions quiet environment facilitated;relaxation techniques promoted   Virtual Nurse - Patient Verbalized Approval Of VN Rounding;Camera Use   Type of Frequent Check   Type Patient Rounds   Safety/Activity   Patient Rounds bed in low position;bed wheels locked;call light in patient/parent reach;clutter free environment maintained;ID band on;visualized patient;placement of personal items at bedside   Safety Promotion/Fall Prevention assistive device/personal item within reach;bed alarm set;side rails raised x 2;Fall Risk reviewed with patient/family;nonskid shoes/socks when out of bed;patient expresses understanding of fall risk and prevention;instructed to call staff for mobility   Safety Precautions emergency equipment at bedside   Safety Bands on Patient Fall Risk Band   Activity Management Ambulated in room - L4   Activity Assistance Provided assistance, stand-by   Positioning   Body Position position changed independently   Head of Bed (HOB) Positioning HOB elevated   Positioning/Transfer Devices pillows;in use        VIRTUAL NURSE: Pt arrived to unit. Permission received per patient to turn camera to view patient. VIP model explained; patient informed this VN will be working with bedside nurse and the rest of the care team. Plan of care reviewed with patient.  Educated patient on VTE, fall risk and fall risk precautions in place. Call light within reach, side rails up x2. Admission questions completed. Patient instucted to ask staff for assistance. Patient verbalized complete understanding. Patient denies complaints or any needs at this time. Will continue to be available and intervene as needed.

## 2024-11-10 NOTE — ASSESSMENT & PLAN NOTE
Lab Results   Component Value Date    CHOL 235 (H) 11/10/2024    CHOL 228 (H) 06/03/2024    CHOL 236 (H) 11/28/2023     Lab Results   Component Value Date     (H) 11/10/2024    HDL 60 06/03/2024    HDL 64 11/28/2023     Lab Results   Component Value Date    LDLCALC 100.4 11/10/2024    LDLCALC Invalid, Trig>400.0 06/03/2024    LDLCALC Invalid, Trig>400.0 11/28/2023     Lab Results   Component Value Date    TRIG 138 11/10/2024    TRIG 437 (H) 06/03/2024    TRIG 419 (H) 11/28/2023     Plan:  -started her on statin  -cardiac diet  -we will monitor closely

## 2024-11-10 NOTE — H&P
Hospital Medicine  History and Physical Exam    Team: Networked reference to record PCT  Prem Emery MD, MD  Admit Date: 11/9/2024  [facility]   Principal Problem:  <principal problem not specified>   Patient information was obtained from patient and ER records.   Primary care Physician: Steve Skelton III, MD  Code status: Full Code    CC- Chest pain    HPI:    49-year-old  female with significant past medical history of hypertension that was transferred from [facility] emergency room with complaint of midsternal chest pain that began yesterday and has been constant ever since.  She also had 1 episode of nausea and vomiting yesterday but none today.  She has not been worked up in the past for myocardial infarction.  She has no prior history of MI or coronary artery disease.  She does have acid reflux for which she takes Protonix but the chest pain feels different , more like pressure.  She also admits to episodes of lightheadedness and dizziness but denies that currently. She admits to being extremely anxious and has been going through lot of stress lately.  Her chest pain was  a 9/10 at presentation but is currently resolved. The pain does not radiate. She has taken ibuprofen on 2 occasions since the symptoms began and that does help but very little. There are no exacerbating factors. She denies any shortness for breath, left arm pain, jaw pain, current nausea. She denies any history of DVT/PE, recent surgery, recent travel, hormone use, cough, hemoptysis, unilateral leg swelling/redness/warmth /tenderness.   EKG from the pharynx center showed nonspecific ST changes and troponin was elevated at 0.355.      Past Medical History: Patient has a past medical history of Hypertension.  Hyperlipidemia but not on any medication, gastroesophageal reflux disease    Past Surgical History: Patient has a past surgical history that includes Tubal ligation; Tubal ligation; Hysterectomy; Reduction of both  breasts (Bilateral, 02/15/2019); Total Reduction Mammoplasty (02/14/2019); and Breast surgery (02/15/2019).    Social History: Patient reports that she has never smoked. She has been exposed to tobacco smoke. She has never used smokeless tobacco. She reports current alcohol use of about 5.0 standard drinks of alcohol per week. She reports that she does not use drugs.    Family History: family history includes Heart disease (age of onset: 45) in her sister; Hypertension in her father and mother; Stroke in her father; Vision loss in her maternal grandmother.    Medications:   Prior to Admission medications    Medication Sig Start Date End Date Taking? Authorizing Provider   allopurinoL (ZYLOPRIM) 300 MG tablet TAKE 1 TABLET BY MOUTH EVERY DAY 9/30/24  Yes Emmie Frausto PA-C   chlorhexidine (PERIDEX) 0.12 % solution  5/4/23   Provider, Sara   colchicine (COLCRYS) 0.6 mg tablet Take 1 tablet (0.6 mg total) by mouth 2 (two) times daily. 7/11/24   Steve Skelton III, MD   indomethacin (INDOCIN) 50 MG capsule Take 1 capsule (50 mg total) by mouth 2 (two) times daily with meals. 3/3/23   Jose Angel Jolley MD   losartan (COZAAR) 100 MG tablet TAKE 1 TABLET BY MOUTH EVERY DAY 7/21/24  Yes Steve Skelton III, MD   pantoprazole (PROTONIX) 40 MG tablet Take 1 tablet (40 mg total) by mouth every morning. 6/5/24  Yes Steve Skelton III, MD   spironolactone (ALDACTONE) 100 MG tablet Take 1 tablet (100 mg total) by mouth once daily. 3/12/24  Yes Steve Skelton III, MD       Allergies: Patient has No Known Allergies.    ROS    Constitutional: neg for fever or chills  Eyes: neg for visual changes  ENT: neg for nasal congestion or sore throat  Respiratory: neg for cough or shortness of breath  Cardiovascular: neg for chest pain or palpitations  Gastrointestinal: neg for nausea or vomiting or abdominal pain. Neg for change in bowel habits  Genitourinary: neg for hematuria or dysuria  Integument/Breast: neg for rash or  "pruritis  Hematologic/Lymphatic: neg for easy bruising neg for lymphadenopathy   Musculoskeletal: neg for arthralgias or myalgias  Neurological: neg for seizures or tremors  Endocrine: neg for heat or cold intolerance    PEx  Temp:  [97.6 °F (36.4 °C)-98.3 °F (36.8 °C)]   Pulse:  []   Resp:  [10-25]   BP: (115-124)/(80-86)   SpO2:  [97 %-100 %]   There is no height or weight on file to calculate BMI.     General: well developed, well nourished, neg for acute distress  Eyes: conjunctivae/corneas clear.   Head: normocephalic, atraumatic.   Neck: supple, symmetrical, trachea midline, neg for JVD, neg for carotid bruits  Lungs: clear to auscultation bilaterally, normal respiratory effort  Heart: regular rate and rhythm, neg for murmur  Extremities: neg for edema, neg for joint swelling, pulses: 2+ at ankles   Abdomen: Soft, non-tender; liver and spleen not palpable, bowel sounds active, neg for aortic bruits  Skin: Skin color, texture, turgor normal. Neg for rashes or lesions.   Neurologic: CN II-XII grossly intact, DTR: 2/4 bilaterally. Normal muscle strength and tone. Neg for focal numbness or weakness  Mental status: Alert, oriented x 4, affect appropriate, memory intact    Recent Labs   Lab 11/09/24  1338   WBC 5.86   HGB 11.1*   HCT 31.2*        Recent Labs   Lab 11/09/24  1338   *   K 4.3      CO2 18*   BUN 11   CREATININE 1.4   *   CALCIUM 9.5     Recent Labs   Lab 11/09/24  1338 11/09/24  1427   ALKPHOS 48  --    ALT 12  --    AST 31  --    ALBUMIN 4.1  --    PROT 7.9  --    BILITOT 0.9  --    INR  --  1.0      No results for input(s): "POCTGLUCOSE" in the last 168 hours.  No results for input(s): "LACTATE" in the last 72 hours.     @LABRCNT(CPK:3,CPKMB:3,mb:3,TroponinI:3)  )@  Hemoglobin A1C   Date Value Ref Range Status   06/03/2024 5.0 4.0 - 5.6 % Final     Comment:     ADA Screening Guidelines:  5.7-6.4%  Consistent with prediabetes  >or=6.5%  Consistent with diabetes    High " levels of fetal hemoglobin interfere with the HbA1C  assay. Heterozygous hemoglobin variants (HbS, HgC, etc)do  not significantly interfere with this assay.   However, presence of multiple variants may affect accuracy.     08/29/2023 5.5 4.0 - 5.6 % Final     Comment:     ADA Screening Guidelines:  5.7-6.4%  Consistent with prediabetes  >or=6.5%  Consistent with diabetes    High levels of fetal hemoglobin interfere with the HbA1C  assay. Heterozygous hemoglobin variants (HbS, HgC, etc)do  not significantly interfere with this assay.   However, presence of multiple variants may affect accuracy.     05/08/2023 5.4 4.0 - 5.6 % Final     Comment:     ADA Screening Guidelines:  5.7-6.4%  Consistent with prediabetes  >or=6.5%  Consistent with diabetes    High levels of fetal hemoglobin interfere with the HbA1C  assay. Heterozygous hemoglobin variants (HbS, HgC, etc)do  not significantly interfere with this assay.   However, presence of multiple variants may affect accuracy.           There are no hospital problems to display for this patient.    EKG Readings: (Independently Interpreted)   Initial Reading: No STEMI. Rhythm: Sinus Tachycardia. Heart Rate: 122. Clinical Impression: Sinus Tachycardia Other Impression: Inverted T-waves        Chest x-ray: shows no acute cardiopulmonary process.         Assessment and Plan:    49-year-old woman with history of hypertension presenting with chest pressure abnormal EKG and positive troponin concerning for non STEMI.  Patient has been discussed with Cardiology will be evaluated prior to discharge with a possible stress test/cardiac catheterization.    The patient is currently chest pain-free.  She is currently on heparin drip which is well tolerated and will be continued.    # chest pain with abnormal EKG and positive troponin concerning for non STEMI  Patient got Plavix and aspirin from the referring emergency room  We will continue Aspirin 81 mg daily   Continue heparin  drip  Echocardiogram   Cardiology consulted   Will trend troponin  Given history of reflux, we will continue Protonix     # hypertension   Continue home blood pressure medicine   2 g sodium diet    # hyperlipidemia   Start statins   Check lipid panel     Goals of Care: Return to prior functional status  Discharge plan:  Home      All questions answered  Time (47 minutes) spent in care of the patient (Greater than 1/2 spent in direct face-to-face contact)     Prem Emery MD  [facility] Medicine Team

## 2024-11-10 NOTE — PHARMACY MED REC
"  [facility]           Pharmacy  Admission Medication History     The home medication history was taken by Marleny Law PharmD.      Medication history obtained from Medications listed below were obtained from: Patient/family and Analytic software- [employer]    Based on information gathered for medication list, you may go to "Admission" then "Reconcile Home Medications" tabs to review and/or act upon those items.     The home medication list has been updated by the Pharmacy department.   Please read ALL comments highlighted in yellow.   Please address this information as you see fit.    Feel free to contact us if you have any questions or require assistance.    The current inpatient medication list has been compared to the home medication list and the following discrepancies were noted:    NO DISCREPANCIES NOTED    Potential issues to be addressed PRIOR TO DISCHARGE      Current Facility-Administered Medications on File Prior to Encounter   Medication Dose Route Frequency Provider Last Rate Last Admin    [COMPLETED] aspirin tablet 325 mg  325 mg Oral ED 1 Time Reece Sousa PA-C   325 mg at 11/09/24 1324    [COMPLETED] clopidogreL tablet 600 mg  600 mg Oral ED 1 Time Reece Sousa PA-C   600 mg at 11/09/24 1431    [COMPLETED] heparin 25,000 units in dextrose 5% (100 units/ml) IV bolus from bag LOW INTENSITY nomogram - [facility]  57.3 Units/kg (Adjusted) Intravenous Once Randy Sandoval MD   3,990 Units at 11/09/24 1438    [COMPLETED] LORazepam injection 0.5 mg  0.5 mg Intravenous ED 1 Time Reece Sousa PA-C   0.5 mg at 11/09/24 1341    [COMPLETED] morphine injection 4 mg  4 mg Intravenous ED 1 Time Reece Sousa PA-C   4 mg at 11/09/24 1554    [DISCONTINUED] heparin 25,000 units in dextrose 5% (100 units/ml) IV bolus from bag HIGH INTENSITY nomogram - [facility]  80 Units/kg (Adjusted) Intravenous Once Reece Sousa PA-C        [DISCONTINUED] heparin 25,000 units in " dextrose 5% (100 units/ml) IV bolus from bag HIGH INTENSITY nomogram - [facility]  60 Units/kg (Adjusted) Intravenous PRN Reece Sousa PA-C        [DISCONTINUED] heparin 25,000 units in dextrose 5% (100 units/ml) IV bolus from bag HIGH INTENSITY nomogram - [facility]  30 Units/kg (Adjusted) Intravenous PRN Reece Sousa PA-C        [DISCONTINUED] heparin 25,000 units in dextrose 5% (100 units/ml) IV bolus from bag LOW INTENSITY nomogram - [facility]  57.3 Units/kg (Adjusted) Intravenous PRN Randy Sandoval MD        [DISCONTINUED] heparin 25,000 units in dextrose 5% (100 units/ml) IV bolus from bag LOW INTENSITY nomogram - [facility]  30 Units/kg (Adjusted) Intravenous PRN Randy Sandoval MD        [DISCONTINUED] heparin 25,000 units in dextrose 5% 250 mL (100 units/mL) infusion HIGH INTENSITY nomogram - [facility]  30 Units/kg/hr (Adjusted) Intravenous Continuous Reece Sousa PA-C        [DISCONTINUED] heparin 25,000 units in dextrose 5% 250 mL (100 units/mL) infusion LOW INTENSITY nomogram - [facility]  12 Units/kg/hr (Adjusted) Intravenous Continuous Randy Sandoval MD 8.4 mL/hr at 11/09/24 1438 12 Units/kg/hr at 11/09/24 1438     Current Outpatient Medications on File Prior to Encounter   Medication Sig Dispense Refill    allopurinoL (ZYLOPRIM) 300 MG tablet TAKE 1 TABLET BY MOUTH EVERY DAY 90 tablet 0    losartan (COZAAR) 100 MG tablet TAKE 1 TABLET BY MOUTH EVERY DAY 90 tablet 3    pantoprazole (PROTONIX) 40 MG tablet Take 1 tablet (40 mg total) by mouth every morning. 90 tablet 1    spironolactone (ALDACTONE) 100 MG tablet Take 1 tablet (100 mg total) by mouth once daily. 90 tablet 2    colchicine (COLCRYS) 0.6 mg tablet Take 1 tablet (0.6 mg total) by mouth 2 (two) times daily. 180 tablet 3    indomethacin (INDOCIN) 50 MG capsule Take 1 capsule (50 mg total) by mouth 2 (two) times daily with meals. 60 capsule 2    [DISCONTINUED] chlorhexidine (PERIDEX) 0.12 % solution          Please address this information as you see  fit.  Feel free to contact us if you have any questions or require assistance.    Marleny Law, PharmD  [phone]                .

## 2024-11-10 NOTE — ASSESSMENT & PLAN NOTE
Resolved  -has nitro sublingual  -EKG shows picture of NSTEMI  -currently on heparin drip, DA PT, pending echo.  -cardiology consulted, appreciate recs  -planning for cardiac angio on 11/11

## 2024-11-10 NOTE — NURSING
Tele strip with inverted T waves. PRN EKG done. Significant changes on EKG compared to previous EKG from 11/9 at 1:22pm. MD notified.

## 2024-11-10 NOTE — CONSULTS
[facility] - Telemetry  Cardiology  Consult Note    Patient Name: José Miguel Meng  MRN: [id number]  Admission Date: 11/9/2024  Hospital Length of Stay: 1 days  Code Status: Full Code   Attending Provider: Donaldo Fitzgerald MD   Consulting Provider: Jorden Fong MD  Primary Care Physician: Steve Skelton III, MD  Principal Problem:NSTEMI (non-ST elevated myocardial infarction)    Patient information was obtained from patient and ER records.     Consults  Subjective:     Chief Complaint:    Chest pain     HPI:      49-year-old female with past history of reflux disease, hypertension, strong family history of premature CAD with sister having MI in 40s, father  had premature CAD in his early 50s, admitted to the hospital with substernal chest pain and pressure associated with nausea and vomiting, significant improvement in chest pain with sublingual nitroglycerin, currently chest pain-free, EKG shows inferior and anterior T-wave inversions with troponin peak at 0.3 now trending down, LDL of 100 A1c of 5, echocardiogram in April 23 showed normal left ventricular ejection fraction without any focal wall motion abnormalities or major valvular abnormalities    Past Medical History:   Diagnosis Date    Hypertension        Past Surgical History:   Procedure Laterality Date    BREAST SURGERY  02/15/2019    Reduction    HYSTERECTOMY      REDUCTION OF BOTH BREASTS Bilateral 02/15/2019    Procedure: MAMMOPLASTY, REDUCTION, BILATERAL;  Surgeon: Rajinder Aldrich MD;  Location: [facility] OR [address] [place] [address];  Service: Plastics;  Laterality: Bilateral;    TOTAL REDUCTION MAMMOPLASTY  02/14/2019    TUBAL LIGATION      TUBAL LIGATION         Review of patient's allergies indicates:  No Known Allergies    Current Facility-Administered Medications on File Prior to Encounter   Medication    [COMPLETED] aspirin tablet 325 mg    [COMPLETED] clopidogreL tablet 600 mg    [COMPLETED] heparin 25,000 units in dextrose 5% (100 units/ml) IV bolus from bag  LOW INTENSITY nomogram - [facility]    [COMPLETED] LORazepam injection 0.5 mg    [COMPLETED] morphine injection 4 mg    [DISCONTINUED] heparin 25,000 units in dextrose 5% (100 units/ml) IV bolus from bag HIGH INTENSITY nomogram - [facility]    [DISCONTINUED] heparin 25,000 units in dextrose 5% (100 units/ml) IV bolus from bag HIGH INTENSITY nomogram - [facility]    [DISCONTINUED] heparin 25,000 units in dextrose 5% (100 units/ml) IV bolus from bag HIGH INTENSITY nomogram - [facility]    [DISCONTINUED] heparin 25,000 units in dextrose 5% (100 units/ml) IV bolus from bag LOW INTENSITY nomogram - [facility]    [DISCONTINUED] heparin 25,000 units in dextrose 5% (100 units/ml) IV bolus from bag LOW INTENSITY nomogram - [facility]    [DISCONTINUED] heparin 25,000 units in dextrose 5% 250 mL (100 units/mL) infusion HIGH INTENSITY nomogram - [facility]    [DISCONTINUED] heparin 25,000 units in dextrose 5% 250 mL (100 units/mL) infusion LOW INTENSITY nomogram - [facility]     Current Outpatient Medications on File Prior to Encounter   Medication Sig    allopurinoL (ZYLOPRIM) 300 MG tablet TAKE 1 TABLET BY MOUTH EVERY DAY    losartan (COZAAR) 100 MG tablet TAKE 1 TABLET BY MOUTH EVERY DAY    pantoprazole (PROTONIX) 40 MG tablet Take 1 tablet (40 mg total) by mouth every morning.    spironolactone (ALDACTONE) 100 MG tablet Take 1 tablet (100 mg total) by mouth once daily.    colchicine (COLCRYS) 0.6 mg tablet Take 1 tablet (0.6 mg total) by mouth 2 (two) times daily.    indomethacin (INDOCIN) 50 MG capsule Take 1 capsule (50 mg total) by mouth 2 (two) times daily with meals.    [DISCONTINUED] chlorhexidine (PERIDEX) 0.12 % solution      Family History       Problem Relation (Age of Onset)    Heart disease Sister (45)    Hypertension Mother, Father    Stroke Father    Vision loss Maternal Grandmother          Tobacco Use    Smoking status: Never     Passive exposure: Current    Smokeless tobacco: Never   Substance and Sexual Activity    Alcohol use: Yes     Alcohol/week: 5.0  standard drinks of alcohol     Types: 5 Glasses of wine per week     Comment: socially    Drug use: No    Sexual activity: Yes     Partners: Male     Birth control/protection: Condom, Partner-Vasectomy     Review of Systems   Constitutional: Negative for chills and fever.   HENT:  Negative for hearing loss and nosebleeds.    Eyes:  Negative for blurred vision.   Cardiovascular:  Negative for chest pain, leg swelling and palpitations.   Respiratory:  Negative for hemoptysis and shortness of breath.    Hematologic/Lymphatic: Negative for bleeding problem.   Skin:  Negative for itching.   Musculoskeletal:  Negative for falls.   Gastrointestinal:  Negative for abdominal pain and hematochezia.   Genitourinary:  Negative for hematuria.   Neurological:  Negative for dizziness and loss of balance.   Psychiatric/Behavioral:  Negative for altered mental status and depression.      Objective:     Vital Signs (Most Recent):  Temp: 97.2 °F (36.2 °C) (11/10/24 1118)  Pulse: 86 (11/10/24 1118)  Resp: 17 (11/10/24 1118)  BP: 126/78 (11/10/24 1118)  SpO2: 100 % (11/10/24 1118) Vital Signs (24h Range):  Temp:  [97.2 °F (36.2 °C)-98.3 °F (36.8 °C)] 97.2 °F (36.2 °C)  Pulse:  [] 86  Resp:  [10-25] 17  SpO2:  [97 %-100 %] 100 %  BP: (102-126)/(71-88) 126/78     Weight: 74.7 kg (164 lb 10.9 oz)  Body mass index is 25.04 kg/m².    SpO2: 100 %         Intake/Output Summary (Last 24 hours) at 11/10/2024 1235  Last data filed at 11/10/2024 1128  Gross per 24 hour   Intake 1078 ml   Output --   Net 1078 ml       Lines/Drains/Airways       Drain  Duration                  Closed/Suction Drain 02/15/19 1515 Right Breast Bulb 15 Fr. 2094 days         Closed/Suction Drain 02/15/19 1516 Left Breast Bulb 15 Fr. 2094 days              Peripheral Intravenous Line  Duration                  Peripheral IV - Single Lumen 20 G Anterior;Left;Proximal Forearm -- days                    Physical Exam  Constitutional:       Appearance: She is  well-developed.   HENT:      Head: Normocephalic and atraumatic.   Eyes:      Conjunctiva/sclera: Conjunctivae normal.   Neck:      Vascular: No carotid bruit or JVD.   Cardiovascular:      Rate and Rhythm: Normal rate and regular rhythm.      Pulses:           Carotid pulses are 2+ on the right side and 2+ on the left side.       Radial pulses are 2+ on the right side and 2+ on the left side.      Heart sounds: Murmur heard.      No friction rub. No gallop.   Pulmonary:      Effort: Pulmonary effort is normal. No respiratory distress.      Breath sounds: Normal breath sounds. No stridor. No wheezing.   Musculoskeletal:      Cervical back: Neck supple.   Skin:     General: Skin is warm and dry.   Neurological:      Mental Status: She is alert and oriented to person, place, and time.   Psychiatric:         Behavior: Behavior normal.         Significant Labs: All pertinent lab results from the last 24 hours have been reviewed. and   Recent Lab Results  (Last 5 results in the past 24 hours)        11/10/24  0749   11/10/24  0240   11/09/24  2319   11/09/24  2150   11/09/24  1427        Benzodiazepines               Methadone metabolites               Phencyclidine               Amphetamines, Urine               PTT 48.7  Comment: Refer to local heparin nomogram for intensity/dose specific   therapeutic   range.  LOT^050^APTT FSL^[id number]     46.6  Comment: Refer to local heparin nomogram for intensity/dose specific   therapeutic   range.  LOT^050^APTT FSL^[id number]       57.5  Comment: Refer to local heparin nomogram for intensity/dose specific   therapeutic   range.  LOT^050^APTT FSL^[id number]     29.9  Comment: Refer to local heparin nomogram for intensity/dose specific   therapeutic   range.  LOT^050^APTT FSL^[id number]         Barbituates, Urine               Baso #   0.05     0.02         Basophil %   0.7     0.3         Cholesterol Total   235  Comment: The National Cholesterol Education Program (NCEP) has set the  following  guidelines (reference ranges) for Cholesterol:  Optimal.....................<200 mg/dL  Borderline High.............200-239 mg/dL  High........................> or = 240 mg/dL               Cocaine, Urine               Urine Creatinine               Differential Method   Automated     Automated         Eos #   0.2     0.1         Eos %   2.5     1.8         Gran # (ANC)   3.8     3.2         Gran %   49.9     48.8         HDL   107  Comment: The National Cholesterol Education Program (NCEP) has set the  following guidelines (reference values) for HDL Cholesterol:  Low...............<40 mg/dL  Optimal...........>60 mg/dL               HDL/Cholesterol Ratio   45.5             Hematocrit   31.0     32.4         Hemoglobin   10.6     11.3         Immature Grans (Abs)   0.02  Comment: Mild elevation in immature granulocytes is non specific and   can be seen in a variety of conditions including stress response,   acute inflammation, trauma and pregnancy. Correlation with other   laboratory and clinical findings is essential.       0.02  Comment: Mild elevation in immature granulocytes is non specific and   can be seen in a variety of conditions including stress response,   acute inflammation, trauma and pregnancy. Correlation with other   laboratory and clinical findings is essential.           Immature Granulocytes   0.3     0.3         INR       1.0  Comment: Coumadin Therapy:  2.0 - 3.0 for INR for all indicators except mechanical heart valves  and antiphospholipid syndromes which should use 2.5 - 3.5.  LOT^040^PT Inn^[id number]     1.0  Comment: Coumadin Therapy:  2.0 - 3.0 for INR for all indicators except mechanical heart valves  and antiphospholipid syndromes which should use 2.5 - 3.5.  LOT^040^PT Inn^[id number]         LDL Cholesterol   100.4  Comment: The National Cholesterol Education Program (NCEP) has set the  following guidelines (reference values) for LDL Cholesterol:  Optimal.......................<130  mg/dL  Borderline High...............130-159 mg/dL  High..........................160-189 mg/dL  Very High.....................>190 mg/dL               Lymph #   3.1     2.9         Lymph %   40.5     44.5         MCH   32.7     33.0         MCHC   34.2     34.9         MCV   96     95         Mono #   0.5     0.3         Mono %   6.1     4.3         MPV   10.3     9.7         Non-HDL Cholesterol   128  Comment: Risk category and Non-HDL cholesterol goals:  Coronary heart disease (CHD)or equivalent (10-year risk of CHD >20%):  Non-HDL cholesterol goal     <130 mg/dL  Two or more CHD risk factors and 10-year risk of CHD <= 20%:  Non-HDL cholesterol goal     <160 mg/dL  0 to 1 CHD risk factor:  Non-HDL cholesterol goal     <190 mg/dL               nRBC   0     0         Opiates, Urine               Platelet Count   228     241         PT       11.1   10.9       RBC   3.24     3.42         RDW   12.7     12.4         Marijuana (THC) Metabolite               Total Cholesterol/HDL Ratio   2.2             Toxicology Information               Triglycerides   138  Comment: The National Cholesterol Education Program (NCEP) has set the  following guidelines (reference values) for triglycerides:  Normal......................<150 mg/dL  Borderline High.............150-199 mg/dL  High........................200-499 mg/dL               Troponin I   0.168  Comment: The reference interval for Troponin I represents the 99th percentile   cutoff   for our facility and is consistent with 3rd generation assay   performance.     0.188  Comment: The reference interval for Troponin I represents the 99th percentile   cutoff   for our facility and is consistent with 3rd generation assay   performance.             WBC   7.65     6.58                                Significant Imaging: Echocardiogram: Transthoracic echo (TTE) complete ([facility] Only):   Results for orders placed or performed during the hospital encounter of 04/25/23   Echo Saline  Bubble? Yes   Result Value Ref Range    BSA 2.2 m2    TDI SEPTAL 0.07 m/s    LV LATERAL E/E' RATIO 11.50 m/s    LV SEPTAL E/E' RATIO 9.86 m/s    LA WIDTH 3.60 cm    IVC diameter 1.66 cm    Left Ventricular Outflow Tract Mean Velocity 0.62 cm/s    Left Ventricular Outflow Tract Mean Gradient 1.77 mmHg    TDI LATERAL 0.06 m/s    LVIDd 4.91 3.5 - 6.0 cm    IVS 1.10 0.6 - 1.1 cm    PW 1.09 0.6 - 1.1 cm    LVIDs 2.92 2.1 - 4.0 cm    FS 41 28 - 44 %    LA Vol 53.13 cm3    LV mass 199.90 g    LA size 3.82 cm    RVDD 3.22 cm    RV S' 0.01 cm/s    Left Ventricle Relative Wall Thickness 0.44 cm    AV mean gradient 4 mmHg    AV valve area 2.24 cm2    AV Velocity Ratio 0.73     AV index (prosthetic) 0.70     MV mean gradient 2 mmHg    MV valve area p 1/2 method 4.01 cm2    MV valve area by continuity eq 2.29 cm2    E/A ratio 0.83     Mean e' 0.07 m/s    E wave deceleration time 165.81 msec    IVRT 119.89 msec    LVOT diameter 2.02 cm    LVOT area 3.2 cm2    LVOT peak dago 0.98 m/s    LVOT peak VTI 14.50 cm    Ao peak dago 1.34 m/s    Ao VTI 20.7 cm    Mr max dago 5.53 m/s    LVOT stroke volume 46.45 cm3    AV peak gradient 7 mmHg    MV peak gradient 6 mmHg    E/E' ratio 10.62 m/s    MV Peak E Dago 0.69 m/s    TR Max Dago 2.73 m/s    MV VTI 20.3 cm    MV stenosis pressure 1/2 time 54.83 ms    MV Peak A Dago 0.83 m/s    LV Systolic Volume 17.02 mL    LV Systolic Volume Index 7.9 mL/m2    LV Diastolic Volume 49.11 mL    LV Diastolic Volume Index 22.84 mL/m2    TRENT 24.7 mL/m2    LV Mass Index 93 g/m2    RA Major Axis 4.51 cm    Left Atrium Minor Axis 4.16 cm    Left Atrium Major Axis 5.01 cm    Triscuspid Valve Regurgitation Peak Gradient 30 mmHg    TRENT (MOD) 17.7 mL/m2    LA Vol (MOD) 38.09 cm3    Machado's Biplane MOD Ejection Fraction 7 %    Right Atrial Pressure (from IVC) 3 mmHg    EF 60 %    TV resting pulmonary artery pressure 33 mmHg    Narrative    · The left ventricle is normal in size with concentric remodeling and   normal  systolic function.  · The estimated ejection fraction is 60%.  · Normal right ventricular size with normal right ventricular systolic   function.  · Normal left ventricular diastolic function.  · The estimated PA systolic pressure is 33 mmHg.  · Normal central venous pressure (3 mmHg).  · There is no evidence of intracardiac shunting.        Assessment and Plan:      NSTEMI   Hypertension   Family history of premature CAD      -  continue dual antiplatelet therapy and high-intensity statin.  -   Continue Aldactone and metoprolol for blood pressure management.   -  patient has non ST elevation MI.  Echocardiogram tomorrow morning.  NPO after midnight for possible left heart catheterization tomorrow.  Risks and benefits of the procedure discussed with the patient.   Patient not at high bleeding risk.    Active Diagnoses:    Diagnosis Date Noted POA    PRINCIPAL PROBLEM:  NSTEMI (non-ST elevated myocardial infarction) [I21.4] 11/10/2024 Yes    Hyperlipidemia [E78.5] 11/10/2024 Yes     Chronic    Chest pain [R07.9] 11/09/2024 Yes    Essential hypertension [I10] 06/27/2022 Yes      Problems Resolved During this Admission:       VTE Risk Mitigation (From admission, onward)           Ordered     heparin 25,000 units in dextrose 5% (100 units/ml) IV bolus from bag LOW INTENSITY nomogram - [facility]  As needed (PRN)        Question:  Heparin Infusion Adjustment (DO NOT MODIFY ANSWER)  Answer:  [facility]\Images\Pharmacy\HeparinInfusions\heparin LOW INTENSITY nomogram for [facility] [id number]    11/09/24 2128     heparin 25,000 units in dextrose 5% (100 units/ml) IV bolus from bag LOW INTENSITY nomogram - [facility]  As needed (PRN)        Question:  Heparin Infusion Adjustment (DO NOT MODIFY ANSWER)  Answer:  [facility]\Images\Pharmacy\HeparinInfusions\heparin LOW INTENSITY nomogram for [facility] [id number]    11/09/24 2128     heparin 25,000 units in dextrose 5% 250 mL (100 units/mL) infusion LOW INTENSITY nomogram - [facility]  Continuous         Question:  Begin at (units/kg/hr)  Answer:  12    11/09/24 2128     Place sequential compression device  Until discontinued         11/09/24 2128     IP VTE LOW RISK PATIENT  Once         11/09/24 2128                    Thank you for your consult. I will follow-up with patient. Please contact us if you have any additional questions.    Jorden Fong MD  Cardiology   [place] - Telemetry

## 2024-11-10 NOTE — ASSESSMENT & PLAN NOTE
-EKG showed picture of NSTEMI with elevated troponin.  -started on heparin drip and DA PT, cardiology consulted appreciate recs  -planning for cardiac angio on 11/11

## 2024-11-10 NOTE — HPI
49-year-old  female with significant past medical history of hypertension that was transferred from [facility] emergency room with complaint of midsternal chest pain that began yesterday and has been constant ever since.  She also had 1 episode of nausea and vomiting yesterday but none today.  She has not been worked up in the past for myocardial infarction.  She has no prior history of MI or coronary artery disease.  She does have acid reflux for which she takes Protonix but the chest pain feels different , more like pressure.  She also admits to episodes of lightheadedness and dizziness but denies that currently. She admits to being extremely anxious and has been going through lot of stress lately.  Her chest pain was  a 9/10 at presentation but is currently resolved. The pain does not radiate. She has taken ibuprofen on 2 occasions since the symptoms began and that does help but very little. There are no exacerbating factors. She denies any shortness for breath, left arm pain, jaw pain, current nausea. She denies any history of DVT/PE, recent surgery, recent travel, hormone use, cough, hemoptysis, unilateral leg swelling/redness/warmth /tenderness.   EKG from the pharynx center showed nonspecific ST changes and troponin was elevated at 0.355.    
16

## 2024-11-10 NOTE — SUBJECTIVE & OBJECTIVE
Interval History:  Currently patient alert and oriented x4 free of chest pain denies any shortness of breath no nausea vomiting or diarrhea patient will be NPO at midnight for cardiac catheterization on Monday 11/11.  Currently on heparin drip with DAPT.    Review of Systems  Constitutional: neg for fever or chills  Eyes: neg for visual changes  ENT: neg for nasal congestion or sore throat  Respiratory: neg for cough or shortness of breath  Cardiovascular: neg for chest pain or palpitations  Gastrointestinal: neg for nausea or vomiting or abdominal pain. Neg for change in bowel habits  Genitourinary: neg for hematuria or dysuria  Integument/Breast: neg for rash or pruritis  Hematologic/Lymphatic: neg for easy bruising neg for lymphadenopathy   Musculoskeletal: neg for arthralgias or myalgias  Neurological: neg for seizures or tremors  Endocrine: neg for heat or cold intolerance  Objective:     Vital Signs (Most Recent):  Temp: 97.6 °F (36.4 °C) (11/10/24 0750)  Pulse: 88 (11/10/24 0750)  Resp: 17 (11/10/24 0750)  BP: 105/77 (11/10/24 0750)  SpO2: 100 % (11/10/24 0750) Vital Signs (24h Range):  Temp:  [97.4 °F (36.3 °C)-98.3 °F (36.8 °C)] 97.6 °F (36.4 °C)  Pulse:  [] 88  Resp:  [10-25] 17  SpO2:  [97 %-100 %] 100 %  BP: (102-124)/(71-88) 105/77     Weight: 74.7 kg (164 lb 10.9 oz)  Body mass index is 25.04 kg/m².    Intake/Output Summary (Last 24 hours) at 11/10/2024 1057  Last data filed at 11/10/2024 0858  Gross per 24 hour   Intake 838 ml   Output --   Net 838 ml         Physical Exam      General: well developed, well nourished, neg for acute distress  Eyes: conjunctivae/corneas clear.   Head: normocephalic, atraumatic.   Neck: supple, symmetrical, trachea midline, neg for JVD, neg for carotid bruits  Lungs: clear to auscultation bilaterally, normal respiratory effort  Heart: regular rate and rhythm, neg for murmur  Extremities: neg for edema, neg for joint swelling, pulses: 2+ at ankles   Abdomen: Soft,  non-tender; liver and spleen not palpable, bowel sounds active, neg for aortic bruits  Skin: Skin color, texture, turgor normal. Neg for rashes or lesions.   Neurologic: CN II-XII grossly intact, DTR: 2/4 bilaterally. Normal muscle strength and tone. Neg for focal numbness or weakness  Mental status: Alert, oriented x 4, affect appropriate, memory intact    Significant Labs: All pertinent labs within the past 24 hours have been reviewed.  Recent Lab Results  (Last 5 results in the past 24 hours)        11/10/24  0749   11/10/24  0240   11/09/24  2319   11/09/24  2150   11/09/24  1427        Benzodiazepines               Methadone metabolites               Phencyclidine               Amphetamines, Urine               PTT 48.7  Comment: Refer to local heparin nomogram for intensity/dose specific   therapeutic   range.  LOT^050^APTT FSL^[id number]     46.6  Comment: Refer to local heparin nomogram for intensity/dose specific   therapeutic   range.  LOT^050^APTT FSL^[id number]       57.5  Comment: Refer to local heparin nomogram for intensity/dose specific   therapeutic   range.  LOT^050^APTT FSL^[id number]     29.9  Comment: Refer to local heparin nomogram for intensity/dose specific   therapeutic   range.  LOT^050^APTT FSL^[id number]         Barbituates, Urine               Baso #   0.05     0.02         Basophil %   0.7     0.3         Cholesterol Total   235  Comment: The National Cholesterol Education Program (NCEP) has set the  following guidelines (reference ranges) for Cholesterol:  Optimal.....................<200 mg/dL  Borderline High.............200-239 mg/dL  High........................> or = 240 mg/dL               Cocaine, Urine               Urine Creatinine               Differential Method   Automated     Automated         Eos #   0.2     0.1         Eos %   2.5     1.8         Gran # (ANC)   3.8     3.2         Gran %   49.9     48.8         HDL   107  Comment: The National Cholesterol Education Program (NCEP) has  set the  following guidelines (reference values) for HDL Cholesterol:  Low...............<40 mg/dL  Optimal...........>60 mg/dL               HDL/Cholesterol Ratio   45.5             Hematocrit   31.0     32.4         Hemoglobin   10.6     11.3         Immature Grans (Abs)   0.02  Comment: Mild elevation in immature granulocytes is non specific and   can be seen in a variety of conditions including stress response,   acute inflammation, trauma and pregnancy. Correlation with other   laboratory and clinical findings is essential.       0.02  Comment: Mild elevation in immature granulocytes is non specific and   can be seen in a variety of conditions including stress response,   acute inflammation, trauma and pregnancy. Correlation with other   laboratory and clinical findings is essential.           Immature Granulocytes   0.3     0.3         INR       1.0  Comment: Coumadin Therapy:  2.0 - 3.0 for INR for all indicators except mechanical heart valves  and antiphospholipid syndromes which should use 2.5 - 3.5.  LOT^040^PT Inn^[id number]     1.0  Comment: Coumadin Therapy:  2.0 - 3.0 for INR for all indicators except mechanical heart valves  and antiphospholipid syndromes which should use 2.5 - 3.5.  LOT^040^PT Inn^[id number]         LDL Cholesterol   100.4  Comment: The National Cholesterol Education Program (NCEP) has set the  following guidelines (reference values) for LDL Cholesterol:  Optimal.......................<130 mg/dL  Borderline High...............130-159 mg/dL  High..........................160-189 mg/dL  Very High.....................>190 mg/dL               Lymph #   3.1     2.9         Lymph %   40.5     44.5         MCH   32.7     33.0         MCHC   34.2     34.9         MCV   96     95         Mono #   0.5     0.3         Mono %   6.1     4.3         MPV   10.3     9.7         Non-HDL Cholesterol   128  Comment: Risk category and Non-HDL cholesterol goals:  Coronary heart disease (CHD)or equivalent  (10-year risk of CHD >20%):  Non-HDL cholesterol goal     <130 mg/dL  Two or more CHD risk factors and 10-year risk of CHD <= 20%:  Non-HDL cholesterol goal     <160 mg/dL  0 to 1 CHD risk factor:  Non-HDL cholesterol goal     <190 mg/dL               nRBC   0     0         Opiates, Urine               Platelet Count   228     241         PT       11.1   10.9       RBC   3.24     3.42         RDW   12.7     12.4         Marijuana (THC) Metabolite               Total Cholesterol/HDL Ratio   2.2             Toxicology Information               Triglycerides   138  Comment: The National Cholesterol Education Program (NCEP) has set the  following guidelines (reference values) for triglycerides:  Normal......................<150 mg/dL  Borderline High.............150-199 mg/dL  High........................200-499 mg/dL               Troponin I   0.168  Comment: The reference interval for Troponin I represents the 99th percentile   cutoff   for our facility and is consistent with 3rd generation assay   performance.     0.188  Comment: The reference interval for Troponin I represents the 99th percentile   cutoff   for our facility and is consistent with 3rd generation assay   performance.             WBC   7.65     6.58                                Significant Imaging: I have reviewed all pertinent imaging results/findings within the past 24 hours.

## 2024-11-11 ENCOUNTER — TELEPHONE (OUTPATIENT)
Dept: CARDIOLOGY | Facility: CLINIC | Age: 49
End: 2024-11-11
Payer: MEDICAID

## 2024-11-11 LAB
ANION GAP SERPL CALC-SCNC: 10 MMOL/L (ref 8–16)
APICAL FOUR CHAMBER EJECTION FRACTION: 41 %
APICAL TWO CHAMBER EJECTION FRACTION: 60 %
APTT PPP: 54.2 SEC (ref 21–32)
ASCENDING AORTA: 3.65 CM
AV INDEX (PROSTH): 1
AV MEAN GRADIENT: 2.6 MMHG
AV PEAK GRADIENT: 4.8 MMHG
AV VALVE AREA BY VELOCITY RATIO: 4.2 CM²
AV VALVE AREA: 4.1 CM²
AV VELOCITY RATIO: 1
BASOPHILS # BLD AUTO: 0.03 K/UL (ref 0–0.2)
BASOPHILS NFR BLD: 0.5 % (ref 0–1.9)
BSA FOR ECHO PROCEDURE: 1.89 M2
BUN SERPL-MCNC: 10 MG/DL (ref 6–20)
CALCIUM SERPL-MCNC: 9.2 MG/DL (ref 8.7–10.5)
CHLORIDE SERPL-SCNC: 98 MMOL/L (ref 95–110)
CO2 SERPL-SCNC: 17 MMOL/L (ref 23–29)
CREAT SERPL-MCNC: 1.2 MG/DL (ref 0.5–1.4)
CV ECHO LV RWT: 0.5 CM
DIFFERENTIAL METHOD BLD: ABNORMAL
DOP CALC AO PEAK VEL: 1.1 M/S
DOP CALC AO VTI: 21.3 CM
DOP CALC LVOT AREA: 4.2 CM2
DOP CALC LVOT DIAMETER: 2.3 CM
DOP CALC LVOT PEAK VEL: 1.1 M/S
DOP CALC LVOT STROKE VOLUME: 88 CM3
DOP CALC MV VTI: 27.3 CM
DOP CALCLVOT PEAK VEL VTI: 21.2 CM
E WAVE DECELERATION TIME: 150.77 MSEC
E/A RATIO: 0.65
E/E' RATIO: 11.78 M/S
ECHO LV POSTERIOR WALL: 1.3 CM (ref 0.6–1.1)
EOSINOPHIL # BLD AUTO: 0.2 K/UL (ref 0–0.5)
EOSINOPHIL NFR BLD: 2.6 % (ref 0–8)
ERYTHROCYTE [DISTWIDTH] IN BLOOD BY AUTOMATED COUNT: 12.5 % (ref 11.5–14.5)
EST. GFR  (NO RACE VARIABLE): 55 ML/MIN/1.73 M^2
FRACTIONAL SHORTENING: 42.3 % (ref 28–44)
GLUCOSE SERPL-MCNC: 98 MG/DL (ref 70–110)
HCT VFR BLD AUTO: 29.2 % (ref 37–48.5)
HGB BLD-MCNC: 10 G/DL (ref 12–16)
IMM GRANULOCYTES # BLD AUTO: 0.02 K/UL (ref 0–0.04)
IMM GRANULOCYTES NFR BLD AUTO: 0.3 % (ref 0–0.5)
INTERVENTRICULAR SEPTUM: 1.4 CM (ref 0.6–1.1)
LEFT ATRIUM AREA SYSTOLIC (APICAL 2 CHAMBER): 16.55 CM2
LEFT ATRIUM AREA SYSTOLIC (APICAL 4 CHAMBER): 15.36 CM2
LEFT ATRIUM VOLUME INDEX MOD: 23.2 ML/M2
LEFT ATRIUM VOLUME MOD: 43.59 ML
LEFT INTERNAL DIMENSION IN SYSTOLE: 3 CM (ref 2.1–4)
LEFT VENTRICLE DIASTOLIC VOLUME INDEX: 67.56 ML/M2
LEFT VENTRICLE DIASTOLIC VOLUME: 127.02 ML
LEFT VENTRICLE END DIASTOLIC VOLUME APICAL 2 CHAMBER: 76.76 ML
LEFT VENTRICLE END DIASTOLIC VOLUME APICAL 4 CHAMBER: 45.07 ML
LEFT VENTRICLE END SYSTOLIC VOLUME APICAL 2 CHAMBER: 47.19 ML
LEFT VENTRICLE END SYSTOLIC VOLUME APICAL 4 CHAMBER: 35.64 ML
LEFT VENTRICLE MASS INDEX: 156.3 G/M2
LEFT VENTRICLE SYSTOLIC VOLUME INDEX: 17.9 ML/M2
LEFT VENTRICLE SYSTOLIC VOLUME: 33.66 ML
LEFT VENTRICULAR INTERNAL DIMENSION IN DIASTOLE: 5.2 CM (ref 3.5–6)
LEFT VENTRICULAR MASS: 293.8 G
LV LATERAL E/E' RATIO: 10.6 M/S
LV SEPTAL E/E' RATIO: 13.25 M/S
LVED V (TEICH): 127.02 ML
LVES V (TEICH): 33.66 ML
LVOT MG: 2.14 MMHG
LVOT MV: 0.65 CM/S
LYMPHOCYTES # BLD AUTO: 2.7 K/UL (ref 1–4.8)
LYMPHOCYTES NFR BLD: 41.4 % (ref 18–48)
MAGNESIUM SERPL-MCNC: 1.5 MG/DL (ref 1.6–2.6)
MCH RBC QN AUTO: 32.6 PG (ref 27–31)
MCHC RBC AUTO-ENTMCNC: 34.2 G/DL (ref 32–36)
MCV RBC AUTO: 95 FL (ref 82–98)
MONOCYTES # BLD AUTO: 0.3 K/UL (ref 0.3–1)
MONOCYTES NFR BLD: 5.2 % (ref 4–15)
MV MEAN GRADIENT: 1 MMHG
MV PEAK A VEL: 0.82 M/S
MV PEAK E VEL: 0.53 M/S
MV PEAK GRADIENT: 3 MMHG
MV STENOSIS PRESSURE HALF TIME: 43.72 MS
MV VALVE AREA BY CONTINUITY EQUATION: 3.22 CM2
MV VALVE AREA P 1/2 METHOD: 5.03 CM2
NEUTROPHILS # BLD AUTO: 3.2 K/UL (ref 1.8–7.7)
NEUTROPHILS NFR BLD: 50 % (ref 38–73)
NRBC BLD-RTO: 0 /100 WBC
OHS CV RV/LV RATIO: 0.63 CM
OHS LV EJECTION FRACTION SIMPSONS BIPLANE MOD: 53 %
OHS QRS DURATION: 78 MS
OHS QTC CALCULATION: 516 MS
PHOSPHATE SERPL-MCNC: 2.8 MG/DL (ref 2.7–4.5)
PISA MRMAX VEL: 5.58 M/S
PISA TR MAX VEL: 2.22 M/S
PLATELET # BLD AUTO: 190 K/UL (ref 150–450)
PMV BLD AUTO: 10 FL (ref 9.2–12.9)
POC ACTIVATED CLOTTING TIME K: 140 SEC (ref 74–137)
POCT GLUCOSE: 95 MG/DL (ref 70–110)
POTASSIUM SERPL-SCNC: 3.8 MMOL/L (ref 3.5–5.1)
PULM VEIN S/D RATIO: 1.14
PV MV: 0.54 M/S
PV PEAK D VEL: 0.28 M/S
PV PEAK GRADIENT: 2 MMHG
PV PEAK S VEL: 0.32 M/S
PV PEAK VELOCITY: 0.72 M/S
RA PRESSURE ESTIMATED: 3 MMHG
RA VOL SYS: 30.9 ML
RBC # BLD AUTO: 3.07 M/UL (ref 4–5.4)
RIGHT ATRIAL AREA: 12.5 CM2
RIGHT ATRIUM VOLUME AREA LENGTH APICAL 4 CHAMBER: 29.47 ML
RIGHT VENTRICLE DIASTOLIC BASEL DIMENSION: 3.3 CM
RV TB RVSP: 5 MMHG
RV TISSUE DOPPLER FREE WALL SYSTOLIC VELOCITY 1 (APICAL 4 CHAMBER VIEW): 10.51 CM/S
SAMPLE: ABNORMAL
SINUS: 3.3 CM
SODIUM SERPL-SCNC: 125 MMOL/L (ref 136–145)
STJ: 3.04 CM
TDI LATERAL: 0.05 M/S
TDI SEPTAL: 0.04 M/S
TDI: 0.05 M/S
TR MAX PG: 20 MMHG
TRICUSPID ANNULAR PLANE SYSTOLIC EXCURSION: 1.63 CM
TV REST PULMONARY ARTERY PRESSURE: 23 MMHG
WBC # BLD AUTO: 6.48 K/UL (ref 3.9–12.7)
Z-SCORE OF LEFT VENTRICULAR DIMENSION IN END DIASTOLE: -0.1
Z-SCORE OF LEFT VENTRICULAR DIMENSION IN END SYSTOLE: -0.6

## 2024-11-11 PROCEDURE — 93010 ELECTROCARDIOGRAM REPORT: CPT | Mod: ,,, | Performed by: STUDENT IN AN ORGANIZED HEALTH CARE EDUCATION/TRAINING PROGRAM

## 2024-11-11 PROCEDURE — 93010 ELECTROCARDIOGRAM REPORT: CPT | Mod: ,,, | Performed by: INTERNAL MEDICINE

## 2024-11-11 PROCEDURE — C1894 INTRO/SHEATH, NON-LASER: HCPCS | Performed by: INTERNAL MEDICINE

## 2024-11-11 PROCEDURE — 25500020 PHARM REV CODE 255: Performed by: INTERNAL MEDICINE

## 2024-11-11 PROCEDURE — 85347 COAGULATION TIME ACTIVATED: CPT | Performed by: INTERNAL MEDICINE

## 2024-11-11 PROCEDURE — 63600175 PHARM REV CODE 636 W HCPCS

## 2024-11-11 PROCEDURE — 93005 ELECTROCARDIOGRAM TRACING: CPT

## 2024-11-11 PROCEDURE — 80048 BASIC METABOLIC PNL TOTAL CA: CPT

## 2024-11-11 PROCEDURE — 25000003 PHARM REV CODE 250: Performed by: INTERNAL MEDICINE

## 2024-11-11 PROCEDURE — 11000001 HC ACUTE MED/SURG PRIVATE ROOM

## 2024-11-11 PROCEDURE — 4A023N7 MEASUREMENT OF CARDIAC SAMPLING AND PRESSURE, LEFT HEART, PERCUTANEOUS APPROACH: ICD-10-PCS | Performed by: INTERNAL MEDICINE

## 2024-11-11 PROCEDURE — B2111ZZ FLUOROSCOPY OF MULTIPLE CORONARY ARTERIES USING LOW OSMOLAR CONTRAST: ICD-10-PCS | Performed by: INTERNAL MEDICINE

## 2024-11-11 PROCEDURE — 84100 ASSAY OF PHOSPHORUS: CPT

## 2024-11-11 PROCEDURE — 63600175 PHARM REV CODE 636 W HCPCS: Performed by: INTERNAL MEDICINE

## 2024-11-11 PROCEDURE — 93458 L HRT ARTERY/VENTRICLE ANGIO: CPT | Performed by: INTERNAL MEDICINE

## 2024-11-11 PROCEDURE — 85730 THROMBOPLASTIN TIME PARTIAL: CPT | Performed by: INTERNAL MEDICINE

## 2024-11-11 PROCEDURE — C1769 GUIDE WIRE: HCPCS | Performed by: INTERNAL MEDICINE

## 2024-11-11 PROCEDURE — B2151ZZ FLUOROSCOPY OF LEFT HEART USING LOW OSMOLAR CONTRAST: ICD-10-PCS | Performed by: INTERNAL MEDICINE

## 2024-11-11 PROCEDURE — 93458 L HRT ARTERY/VENTRICLE ANGIO: CPT | Mod: 26,,, | Performed by: INTERNAL MEDICINE

## 2024-11-11 PROCEDURE — 25000242 PHARM REV CODE 250 ALT 637 W/ HCPCS: Performed by: INTERNAL MEDICINE

## 2024-11-11 PROCEDURE — 85025 COMPLETE CBC W/AUTO DIFF WBC: CPT | Performed by: INTERNAL MEDICINE

## 2024-11-11 PROCEDURE — C1887 CATHETER, GUIDING: HCPCS | Performed by: INTERNAL MEDICINE

## 2024-11-11 PROCEDURE — 83735 ASSAY OF MAGNESIUM: CPT

## 2024-11-11 PROCEDURE — 36415 COLL VENOUS BLD VENIPUNCTURE: CPT

## 2024-11-11 PROCEDURE — 94761 N-INVAS EAR/PLS OXIMETRY MLT: CPT

## 2024-11-11 PROCEDURE — 25000003 PHARM REV CODE 250

## 2024-11-11 RX ORDER — SODIUM CHLORIDE 9 MG/ML
INJECTION, SOLUTION INTRAVENOUS CONTINUOUS
Status: ACTIVE | OUTPATIENT
Start: 2024-11-11 | End: 2024-11-11

## 2024-11-11 RX ORDER — FENTANYL CITRATE 50 UG/ML
INJECTION, SOLUTION INTRAMUSCULAR; INTRAVENOUS
Status: DISCONTINUED | OUTPATIENT
Start: 2024-11-11 | End: 2024-11-11 | Stop reason: HOSPADM

## 2024-11-11 RX ORDER — ATORVASTATIN CALCIUM 40 MG/1
40 TABLET, FILM COATED ORAL NIGHTLY
Qty: 90 TABLET | Refills: 3 | Status: SHIPPED | OUTPATIENT
Start: 2024-11-11 | End: 2025-11-11

## 2024-11-11 RX ORDER — LIDOCAINE HYDROCHLORIDE 10 MG/ML
INJECTION, SOLUTION INFILTRATION; PERINEURAL
Status: DISCONTINUED | OUTPATIENT
Start: 2024-11-11 | End: 2024-11-11 | Stop reason: HOSPADM

## 2024-11-11 RX ORDER — MIDAZOLAM HYDROCHLORIDE 1 MG/ML
INJECTION, SOLUTION INTRAMUSCULAR; INTRAVENOUS
Status: DISCONTINUED | OUTPATIENT
Start: 2024-11-11 | End: 2024-11-11 | Stop reason: HOSPADM

## 2024-11-11 RX ORDER — IODIXANOL 320 MG/ML
INJECTION, SOLUTION INTRAVASCULAR
Status: DISCONTINUED | OUTPATIENT
Start: 2024-11-11 | End: 2024-11-11 | Stop reason: HOSPADM

## 2024-11-11 RX ORDER — CLOPIDOGREL BISULFATE 75 MG/1
75 TABLET ORAL DAILY
Qty: 360 TABLET | Refills: 0 | Status: SHIPPED | OUTPATIENT
Start: 2024-11-12 | End: 2025-11-12

## 2024-11-11 RX ORDER — ACETAMINOPHEN 325 MG/1
650 TABLET ORAL EVERY 4 HOURS PRN
Status: DISCONTINUED | OUTPATIENT
Start: 2024-11-11 | End: 2024-11-12 | Stop reason: HOSPADM

## 2024-11-11 RX ORDER — HEPARIN SODIUM 200 [USP'U]/100ML
INJECTION, SOLUTION INTRAVENOUS
Status: DISCONTINUED | OUTPATIENT
Start: 2024-11-11 | End: 2024-11-12 | Stop reason: HOSPADM

## 2024-11-11 RX ORDER — NITROGLYCERIN 0.4 MG/1
0.4 TABLET SUBLINGUAL EVERY 5 MIN PRN
Qty: 90 TABLET | Refills: 3 | Status: SHIPPED | OUTPATIENT
Start: 2024-11-11 | End: 2024-12-11

## 2024-11-11 RX ORDER — NAPROXEN SODIUM 220 MG/1
81 TABLET, FILM COATED ORAL DAILY
Qty: 360 TABLET | Refills: 0 | Status: SHIPPED | OUTPATIENT
Start: 2024-11-12 | End: 2025-11-12

## 2024-11-11 RX ORDER — VERAPAMIL HYDROCHLORIDE 2.5 MG/ML
INJECTION, SOLUTION INTRAVENOUS
Status: DISCONTINUED | OUTPATIENT
Start: 2024-11-11 | End: 2024-11-11 | Stop reason: HOSPADM

## 2024-11-11 RX ORDER — MAGNESIUM SULFATE HEPTAHYDRATE 40 MG/ML
2 INJECTION, SOLUTION INTRAVENOUS ONCE
Status: COMPLETED | OUTPATIENT
Start: 2024-11-11 | End: 2024-11-11

## 2024-11-11 RX ORDER — METOPROLOL SUCCINATE 25 MG/1
25 TABLET, EXTENDED RELEASE ORAL DAILY
Qty: 90 TABLET | Refills: 3 | Status: SHIPPED | OUTPATIENT
Start: 2024-11-11 | End: 2025-11-11

## 2024-11-11 RX ADMIN — CLOPIDOGREL BISULFATE 75 MG: 75 TABLET ORAL at 09:11

## 2024-11-11 RX ADMIN — ALUMINUM HYDROXIDE, MAGNESIUM HYDROXIDE, AND SIMETHICONE 30 ML: 200; 200; 20 SUSPENSION ORAL at 06:11

## 2024-11-11 RX ADMIN — ASPIRIN 81 MG CHEWABLE TABLET 81 MG: 81 TABLET CHEWABLE at 09:11

## 2024-11-11 RX ADMIN — SPIRONOLACTONE 100 MG: 25 TABLET ORAL at 09:11

## 2024-11-11 RX ADMIN — MAGNESIUM SULFATE HEPTAHYDRATE 2 G: 40 INJECTION, SOLUTION INTRAVENOUS at 09:11

## 2024-11-11 RX ADMIN — MUPIROCIN: 20 OINTMENT TOPICAL at 09:11

## 2024-11-11 RX ADMIN — ALUMINUM HYDROXIDE, MAGNESIUM HYDROXIDE, AND SIMETHICONE 30 ML: 200; 200; 20 SUSPENSION ORAL at 05:11

## 2024-11-11 RX ADMIN — ALLOPURINOL 300 MG: 100 TABLET ORAL at 09:11

## 2024-11-11 RX ADMIN — ATORVASTATIN CALCIUM 40 MG: 40 TABLET, FILM COATED ORAL at 09:11

## 2024-11-11 RX ADMIN — PANTOPRAZOLE SODIUM 40 MG: 40 TABLET, DELAYED RELEASE ORAL at 06:11

## 2024-11-11 RX ADMIN — METOPROLOL TARTRATE 12.5 MG: 25 TABLET, FILM COATED ORAL at 09:11

## 2024-11-11 RX ADMIN — ALUMINUM HYDROXIDE, MAGNESIUM HYDROXIDE, AND SIMETHICONE 30 ML: 200; 200; 20 SUSPENSION ORAL at 09:11

## 2024-11-11 RX ADMIN — LORAZEPAM 0.5 MG: 0.5 TABLET ORAL at 09:11

## 2024-11-11 RX ADMIN — NITROGLYCERIN 0.4 MG: 0.4 TABLET, ORALLY DISINTEGRATING SUBLINGUAL at 08:11

## 2024-11-11 NOTE — INTERVAL H&P NOTE
The patient has been examined and the H&P has been reviewed:    I concur with the findings and no changes have occurred since H&P was written.    Anesthesia/Surgery risks, benefits and alternative options discussed and understood by patient/family.          Active Hospital Problems    Diagnosis  POA    *NSTEMI (non-ST elevated myocardial infarction) [I21.4]  Yes    Hyperlipidemia [E78.5]  Yes     Chronic    Chest pain [R07.9]  Yes    Essential hypertension [I10]  Yes      Resolved Hospital Problems   No resolved problems to display.

## 2024-11-11 NOTE — PROGRESS NOTES
[facility] Medicine  Progress Note    Patient Name: José Miguel Meng  MRN: [id number]  Patient Class: IP- Inpatient   Admission Date: 11/9/2024  Length of Stay: 2 days  Attending Physician: Donaldo Fitzgerald MD  Primary Care Provider: Steve Skelton III, MD        Subjective:     Principal Problem:NSTEMI (non-ST elevated myocardial infarction)        HPI:  49-year-old  female with significant past medical history of hypertension that was transferred from [facility] emergency room with complaint of midsternal chest pain that began yesterday and has been constant ever since.  She also had 1 episode of nausea and vomiting yesterday but none today.  She has not been worked up in the past for myocardial infarction.  She has no prior history of MI or coronary artery disease.  She does have acid reflux for which she takes Protonix but the chest pain feels different , more like pressure.  She also admits to episodes of lightheadedness and dizziness but denies that currently. She admits to being extremely anxious and has been going through lot of stress lately.  Her chest pain was  a 9/10 at presentation but is currently resolved. The pain does not radiate. She has taken ibuprofen on 2 occasions since the symptoms began and that does help but very little. There are no exacerbating factors. She denies any shortness for breath, left arm pain, jaw pain, current nausea. She denies any history of DVT/PE, recent surgery, recent travel, hormone use, cough, hemoptysis, unilateral leg swelling/redness/warmth /tenderness.   EKG from the pharynx center showed nonspecific ST changes and troponin was elevated at 0.355.      Overview/Hospital Course:  No notes on file    Interval History:  Patient was tachycardic and was complaining of chest pain, scheduled for cardiac catheterization in the morning, patient was given sublingual nitro EKG looks similar to her baseline.  Meantime to continue on dapt  and today's day 2 of heparin drip.    Review of Systems   Constitutional:  Positive for activity change and fatigue. Negative for chills and diaphoresis.   HENT: Negative.     Eyes: Negative.    Respiratory: Negative.     Cardiovascular:  Positive for chest pain and palpitations. Negative for leg swelling.   Gastrointestinal: Negative.    Endocrine: Negative.    Genitourinary: Negative.    Musculoskeletal: Negative.    Allergic/Immunologic: Negative.    Neurological:  Positive for dizziness.   Psychiatric/Behavioral: Negative.       Objective:     Vital Signs (Most Recent):  Temp: 97.9 °F (36.6 °C) (11/11/24 0743)  Pulse: 81 (11/11/24 0904)  Resp: (!) 22 (11/11/24 0834)  BP: 106/73 (11/11/24 0904)  SpO2: 100 % (11/11/24 0743) Vital Signs (24h Range):  Temp:  [97.2 °F (36.2 °C)-98.1 °F (36.7 °C)] 97.9 °F (36.6 °C)  Pulse:  [] 81  Resp:  [16-22] 22  SpO2:  [95 %-100 %] 100 %  BP: ()/(69-85) 106/73     Weight: 74.7 kg (164 lb 10.9 oz)  Body mass index is 25.04 kg/m².    Intake/Output Summary (Last 24 hours) at 11/11/2024 1041  Last data filed at 11/10/2024 1820  Gross per 24 hour   Intake 1076 ml   Output --   Net 1076 ml         Physical Exam  Constitutional:       General: She is not in acute distress.  Cardiovascular:      Rate and Rhythm: Regular rhythm. Tachycardia present.   Pulmonary:      Effort: Pulmonary effort is normal. No respiratory distress.      Breath sounds: Normal breath sounds.   Abdominal:      General: Abdomen is flat.      Palpations: Abdomen is soft.   Neurological:      Mental Status: She is alert and oriented to person, place, and time.             Significant Labs: All pertinent labs within the past 24 hours have been reviewed.  Recent Lab Results         11/11/24  0322   11/11/24  0321        Anion Gap 10         PTT   54.2  Comment: Refer to local heparin nomogram for intensity/dose specific   therapeutic   range.  LOT^050^APTT FSL^[id number]         Baso #   0.03       Basophil %    0.5       BUN 10         Calcium 9.2         Chloride 98         CO2 17         Creatinine 1.2         Differential Method   Automated       eGFR 55         Eos #   0.2       Eos %   2.6       Glucose 98         Gran # (ANC)   3.2       Gran %   50.0       Hematocrit   29.2       Hemoglobin   10.0       Immature Grans (Abs)   0.02  Comment: Mild elevation in immature granulocytes is non specific and   can be seen in a variety of conditions including stress response,   acute inflammation, trauma and pregnancy. Correlation with other   laboratory and clinical findings is essential.         Immature Granulocytes   0.3       Lymph #   2.7       Lymph %   41.4       Magnesium  1.5         MCH   32.6       MCHC   34.2       MCV   95       Mono #   0.3       Mono %   5.2       MPV   10.0       nRBC   0       Phosphorus Level 2.8         Platelet Count   190       Potassium 3.8         RBC   3.07       RDW   12.5       Sodium 125         WBC   6.48               Significant Imaging: I have reviewed all pertinent imaging results/findings within the past 24 hours.    Assessment/Plan:      * NSTEMI (non-ST elevated myocardial infarction)  -EKG showed picture of NSTEMI with elevated troponin.  -started on heparin drip and DA PT, cardiology consulted appreciate recs  -planning for cardiac angio on 11/11      Hyperlipidemia  Lab Results   Component Value Date    CHOL 235 (H) 11/10/2024    CHOL 228 (H) 06/03/2024    CHOL 236 (H) 11/28/2023     Lab Results   Component Value Date     (H) 11/10/2024    HDL 60 06/03/2024    HDL 64 11/28/2023     Lab Results   Component Value Date    LDLCALC 100.4 11/10/2024    LDLCALC Invalid, Trig>400.0 06/03/2024    LDLCALC Invalid, Trig>400.0 11/28/2023     Lab Results   Component Value Date    TRIG 138 11/10/2024    TRIG 437 (H) 06/03/2024    TRIG 419 (H) 11/28/2023     Plan:  -started her on statin  -cardiac diet  -we will monitor closely        Chest pain  Resolved  -has nitro  sublingual  -EKG shows picture of NSTEMI  -currently on heparin drip, DA PT, pending echo.  -cardiology consulted, appreciate recs  -planning for cardiac angio on 11/11      Essential hypertension  Patients blood pressure range in the last 24 hours was: BP  Min: 99/74  Max: 126/78.The patient's inpatient anti-hypertensive regimen is listed below:  Current Antihypertensives  spironolactone tablet 100 mg, Daily, Oral  nitroGLYCERIN SL tablet 0.4 mg, Every 5 min PRN, Sublingual  metoprolol tartrate (LOPRESSOR) split tablet 12.5 mg, 2 times daily, Oral    Plan  - BP is controlled, no changes needed to their regimen  - we will monitor closely      VTE Risk Mitigation (From admission, onward)           Ordered     heparin 25,000 units in dextrose 5% (100 units/ml) IV bolus from bag LOW INTENSITY nomogram - [facility]  As needed (PRN)        Question:  Heparin Infusion Adjustment (DO NOT MODIFY ANSWER)  Answer:  \\[email or web address]\epic\Images\Pharmacy\HeparinInfusions\heparin LOW INTENSITY nomogram for [facility] [id number].pdf    11/09/24 2128     heparin 25,000 units in dextrose 5% (100 units/ml) IV bolus from bag LOW INTENSITY nomogram - [facility]  As needed (PRN)        Question:  Heparin Infusion Adjustment (DO NOT MODIFY ANSWER)  Answer:  \\[email or web address]\epic\Images\Pharmacy\HeparinInfusions\heparin LOW INTENSITY nomogram for [facility] [id number].pdf    11/09/24 2128     heparin 25,000 units in dextrose 5% 250 mL (100 units/mL) infusion LOW INTENSITY nomogram - [facility]  Continuous        Question:  Begin at (units/kg/hr)  Answer:  12    11/09/24 2128     Place sequential compression device  Until discontinued         11/09/24 2128     IP VTE LOW RISK PATIENT  Once         11/09/24 2128                    Discharge Planning   DELMI:      Code Status: Full Code   Is the patient medically ready for discharge?:     Reason for patient still in hospital treatment                      Donaldo Carreon MD  Department of Hospital Medicine   [facility] - Telemetry

## 2024-11-11 NOTE — CONSULTS
[facility] - Telemetry  Cardiology  Consult Note    Patient Name: José Miguel Meng  MRN: [id number]  Admission Date: 11/9/2024  Hospital Length of Stay: 2 days  Code Status: Full Code   Attending Provider: Donaldo Fitzgerald MD   Consulting Provider: JOAN Mcguire ANP  Primary Care Physician: Steve Skelton III, MD  Principal Problem:NSTEMI (non-ST elevated myocardial infarction)       [facility]  Consult performed by: Nadia Franks APRN, ANP  Consult ordered by: Prem Emery MD        See full consult noted by Dr. Jorden Smith dated 11/10/2024

## 2024-11-11 NOTE — PLAN OF CARE
Assumed care for this patient in cath recovery and ambulated to bathroom in no distress; 2 ml air removed from right radial band and no bleeding, nor hematoma.Lunch and po fluids served.

## 2024-11-11 NOTE — H&P (VIEW-ONLY)
[facility] - Telemetry  Cardiology  Consult Note    Patient Name: José Miguel Meng  MRN: [id number]  Admission Date: 11/9/2024  Hospital Length of Stay: 2 days  Code Status: Full Code   Attending Provider: Donaldo Fitzgerald MD   Consulting Provider: JOAN Mcguire ANP  Primary Care Physician: Steve Skelton III, MD  Principal Problem:NSTEMI (non-ST elevated myocardial infarction)       [facility]  Consult performed by: Nadia Franks APRN, ANP  Consult ordered by: Prem Emery MD        See full consult noted by Dr. Jorden Smith dated 11/10/2024

## 2024-11-11 NOTE — PLAN OF CARE
VN note: progress notes, labs and vital signs reviewed. Will be available to intervene if needed.     Problem: Adult Inpatient Plan of Care  Goal: Plan of Care Review  Outcome: Progressing      Statement Selected

## 2024-11-11 NOTE — NURSING
Heparin gtt going at 12 units/kg, 9.4 ml/hr. Heparin bag and rate verified at the bedside with Reyna CHAHAL. Handoff not documented in MAR.

## 2024-11-11 NOTE — SUBJECTIVE & OBJECTIVE
Interval History:  Patient was tachycardic and was complaining of chest pain, scheduled for cardiac catheterization in the morning, patient was given sublingual nitro EKG looks similar to her baseline.  Meantime to continue on dapt and today's day 2 of heparin drip.    Review of Systems   Constitutional:  Positive for activity change and fatigue. Negative for chills and diaphoresis.   HENT: Negative.     Eyes: Negative.    Respiratory: Negative.     Cardiovascular:  Positive for chest pain and palpitations. Negative for leg swelling.   Gastrointestinal: Negative.    Endocrine: Negative.    Genitourinary: Negative.    Musculoskeletal: Negative.    Allergic/Immunologic: Negative.    Neurological:  Positive for dizziness.   Psychiatric/Behavioral: Negative.       Objective:     Vital Signs (Most Recent):  Temp: 97.9 °F (36.6 °C) (11/11/24 0743)  Pulse: 81 (11/11/24 0904)  Resp: (!) 22 (11/11/24 0834)  BP: 106/73 (11/11/24 0904)  SpO2: 100 % (11/11/24 0743) Vital Signs (24h Range):  Temp:  [97.2 °F (36.2 °C)-98.1 °F (36.7 °C)] 97.9 °F (36.6 °C)  Pulse:  [] 81  Resp:  [16-22] 22  SpO2:  [95 %-100 %] 100 %  BP: ()/(69-85) 106/73     Weight: 74.7 kg (164 lb 10.9 oz)  Body mass index is 25.04 kg/m².    Intake/Output Summary (Last 24 hours) at 11/11/2024 1041  Last data filed at 11/10/2024 1820  Gross per 24 hour   Intake 1076 ml   Output --   Net 1076 ml         Physical Exam  Constitutional:       General: She is not in acute distress.  Cardiovascular:      Rate and Rhythm: Regular rhythm. Tachycardia present.   Pulmonary:      Effort: Pulmonary effort is normal. No respiratory distress.      Breath sounds: Normal breath sounds.   Abdominal:      General: Abdomen is flat.      Palpations: Abdomen is soft.   Neurological:      Mental Status: She is alert and oriented to person, place, and time.             Significant Labs: All pertinent labs within the past 24 hours have been reviewed.  Recent Lab Results          11/11/24  0322   11/11/24  0321        Anion Gap 10         PTT   54.2  Comment: Refer to local heparin nomogram for intensity/dose specific   therapeutic   range.  LOT^050^APTT FSL^[id number]         Baso #   0.03       Basophil %   0.5       BUN 10         Calcium 9.2         Chloride 98         CO2 17         Creatinine 1.2         Differential Method   Automated       eGFR 55         Eos #   0.2       Eos %   2.6       Glucose 98         Gran # (ANC)   3.2       Gran %   50.0       Hematocrit   29.2       Hemoglobin   10.0       Immature Grans (Abs)   0.02  Comment: Mild elevation in immature granulocytes is non specific and   can be seen in a variety of conditions including stress response,   acute inflammation, trauma and pregnancy. Correlation with other   laboratory and clinical findings is essential.         Immature Granulocytes   0.3       Lymph #   2.7       Lymph %   41.4       Magnesium  1.5         MCH   32.6       MCHC   34.2       MCV   95       Mono #   0.3       Mono %   5.2       MPV   10.0       nRBC   0       Phosphorus Level 2.8         Platelet Count   190       Potassium 3.8         RBC   3.07       RDW   12.5       Sodium 125         WBC   6.48               Significant Imaging: I have reviewed all pertinent imaging results/findings within the past 24 hours.

## 2024-11-11 NOTE — PLAN OF CARE
Problem: Adult Inpatient Plan of Care  Goal: Plan of Care Review  Outcome: Progressing  Goal: Patient-Specific Goal (Individualized)  Outcome: Progressing  Goal: Absence of Hospital-Acquired Illness or Injury  Outcome: Progressing  Goal: Optimal Comfort and Wellbeing  Outcome: Progressing  Goal: Readiness for Transition of Care  Outcome: Progressing     Problem: Comorbidity Management  Goal: Blood Pressure in Desired Range  Outcome: Progressing     Problem: Fall Injury Risk  Goal: Absence of Fall and Fall-Related Injury  Outcome: Progressing     Problem: Pain Acute  Goal: Optimal Pain Control and Function  Outcome: Progressing

## 2024-11-11 NOTE — PLAN OF CARE
Remaining air removed from right radial vasc band. Gauze and tegaderm dressing applied c.d.i. No drainage or shadowing noted. No bleeding or hematoma noted around site. +2 jose alfredo radial pulses palpated. Skin normal in color, warm to touch, < 3 sec cap refill. Pt tolerated well.  Will continue to monitor pt.

## 2024-11-11 NOTE — ASSESSMENT & PLAN NOTE
Patients blood pressure range in the last 24 hours was: BP  Min: 99/74  Max: 126/78.The patient's inpatient anti-hypertensive regimen is listed below:  Current Antihypertensives  spironolactone tablet 100 mg, Daily, Oral  nitroGLYCERIN SL tablet 0.4 mg, Every 5 min PRN, Sublingual  metoprolol tartrate (LOPRESSOR) split tablet 12.5 mg, 2 times daily, Oral    Plan  - BP is controlled, no changes needed to their regimen  - we will monitor closely

## 2024-11-11 NOTE — TELEPHONE ENCOUNTER
----- Message from  Rocio sent at 11/11/2024  3:17 PM CST -----  Regarding: Hospital Follow-Up  [facility]!       Patient is currently admitted here at [facility]. Dr. Fong performed an angiogram on patient today. Can you please schedule her a follow-up? She does have Medicaid which I explained will need further appointments at [facility] if needed. Thank you!    Thank you!  Rocio Alicea RN    [phone]

## 2024-11-11 NOTE — ASSESSMENT & PLAN NOTE
-EKG showed picture of NSTEMI with elevated troponin.  -started on heparin drip and DA PT, cardiology consulted appreciate recs  -planning for cardiac angio on 11/11     denies pain/discomfort

## 2024-11-11 NOTE — NURSING
Pt reported CP 8/10.  MD notified. Vitals WNL, placed on 2LNC, nitro SL x1, CP resolved.  Stat EKG.

## 2024-11-11 NOTE — PLAN OF CARE
Patient transported back to tele bed 457 on tele monitor w/ iv fluids post cath recovery w/ nurse. Pt has food with her from lunch.

## 2024-11-11 NOTE — PLAN OF CARE
Hand off telephone report called to [place] for pt to return to tele bed 457 post recovery. Pt resting quietly in bed and no complaints. Chart and tele box at bedside.

## 2024-11-11 NOTE — PLAN OF CARE
Bedside hand off to nurse Bean in room 457 and site checked per nurse--- right radial site. Family at bedside and updated. Nurse aware that heparin infusion is discontinued.

## 2024-11-11 NOTE — PLAN OF CARE
Patient transferred to recovery cath lab slot 6 via stretcher with side rails up x2 .  Pt AAO X 4 and able to follow commands. Pt is stable when connecting to cardiac monitors.  VSS. Right radial TR band in place c.d.i. no bleeding or hematoma noted. Removed 2cc from TR band +2 jose alfredo radial pulses palpated. +2 jose alfredo pedal pulses.Skin normal in color and warm to touch, <3 sec cap refill.  Fall risk precautions given and patient acknowledges.  AIDET completed to pt.  Will continue to monitor patient.

## 2024-11-11 NOTE — PLAN OF CARE
went to meet with patient. Patient spouse Keegan at bedside. She lives with her significant other at home. Patient does not use any DME or have Home Health. She still drives, but family will transport home at discharge. PCP and Cardiology follow-up appointments to be requested. 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 She is scheduled for an angiogram today. Patient and Spouse encouraged to call with any questions or concerns.  will continue to follow patient through transitions of care and assist with any discharge needs.     In-Basket messages sent to PCP and Cardiology offices for follow-up appointments.    Other Contacts    Name Relation Home Work Mobile   Keegan Lawrence Significant other [phone]   Myah Meng Mother   [phone]     Future Appointments   Date Time Provider Department Center   12/5/2024  8:00 AM LAB, SA[facility] LAB [place]   1/14/2025 11:00 AM Steve Skelton III, MD [facility] Jorden Nj MD  Interventional Cardiology Cardiology [phone] [address] [place] [address]      Next Steps: Schedule an appointment as soon as possible for a visit  Instructions: Message sent to MD office for follow-up.    Steve Skelton III, MD PCP - General Hypertension Digital Medicine Responsible Provider Internal Medicine [phone] [address] [facility] [place] [address]     Next Steps: Follow up  Instructions: Message sent to MD office for follow-up appointment.        11/11/24 1210   Discharge Assessment   Assessment Type Discharge Planning Assessment   Confirmed/corrected address, phone number and insurance Yes   Confirmed Demographics Correct on Facesheet   Source of Information patient;family;health record   People in Home significant other   Facility Arrived From:    Do you expect to return to your current living situation? Yes   Do you have help at  home or someone to help you manage your care at home? Yes   Who are your caregiver(s) and their phone number(s)? HowardKeegan Significant other [phone]   Prior to hospitilization cognitive status: Alert/Oriented   Current cognitive status: Alert/Oriented   Walking or Climbing Stairs Difficulty no   Dressing/Bathing Difficulty no   Equipment Currently Used at Home none   Do you take prescription medications? Yes   Do you have prescription coverage? Yes   Do you have any problems affording any of your prescribed medications? No   Is the patient taking medications as prescribed? yes   Who is going to help you get home at discharge? HowradKeegan Significant other [phone]  Myah Meng Mother   [phone]   How do you get to doctors appointments? family or friend will provide;car, drives self   Are you on dialysis? No   Do you take coumadin? No   Discharge Plan A Home   Discharge Plan B Home with family   DME Needed Upon Discharge  none   Discharge Plan discussed with: Patient;Spouse/sig other   Name(s) and Number(s) Keegan Lawrence Significant other [phone]   Transition of Care Barriers None   Physical Activity   On average, how many days per week do you engage in moderate to strenuous exercise (like a brisk walk)? Pt Declined   On average, how many minutes do you engage in exercise at this level? Pt Declined   Financial Resource Strain   How hard is it for you to pay for the very basics like food, housing, medical care, and heating? Not hard   Housing Stability   In the last 12 months, was there a time when you were not able to pay the mortgage or rent on time? N   At any time in the past 12 months, were you homeless or living in a shelter (including now)? N   Transportation Needs   Has the lack of transportation kept you from medical appointments, meetings, work or from getting things needed for daily living? No   Food Insecurity   Within the past 12 months, you worried that your food would run out  before you got the money to buy more. Never true   Within the past 12 months, the food you bought just didn't last and you didn't have money to get more. Never true   Stress   Do you feel stress - tense, restless, nervous, or anxious, or unable to sleep at night because your mind is troubled all the time - these days? Pt Declined   Social Isolation   How often do you feel lonely or isolated from those around you?  Patient declined   Alcohol Use   Q1: How often do you have a drink containing alcohol? Pt Declined   Q2: How many drinks containing alcohol do you have on a typical day when you are drinking? Pt Declined   Q3: How often do you have six or more drinks on one occasion? Pt Declined   Utilities   In the past 12 months has the electric, gas, oil, or water company threatened to shut off services in your home? No     Rocio Alicea RN    [phone]

## 2024-11-12 ENCOUNTER — PATIENT MESSAGE (OUTPATIENT)
Dept: CARDIOLOGY | Facility: CLINIC | Age: 49
End: 2024-11-12
Payer: MEDICAID

## 2024-11-12 VITALS
DIASTOLIC BLOOD PRESSURE: 94 MMHG | HEART RATE: 84 BPM | RESPIRATION RATE: 20 BRPM | OXYGEN SATURATION: 100 % | BODY MASS INDEX: 24.96 KG/M2 | SYSTOLIC BLOOD PRESSURE: 135 MMHG | HEIGHT: 68 IN | TEMPERATURE: 98 F | WEIGHT: 164.69 LBS

## 2024-11-12 LAB
ANION GAP SERPL CALC-SCNC: 10 MMOL/L (ref 8–16)
BASOPHILS # BLD AUTO: 0.01 K/UL (ref 0–0.2)
BASOPHILS NFR BLD: 0.2 % (ref 0–1.9)
BUN SERPL-MCNC: 7 MG/DL (ref 6–20)
CALCIUM SERPL-MCNC: 9.1 MG/DL (ref 8.7–10.5)
CHLORIDE SERPL-SCNC: 100 MMOL/L (ref 95–110)
CO2 SERPL-SCNC: 17 MMOL/L (ref 23–29)
CREAT SERPL-MCNC: 0.9 MG/DL (ref 0.5–1.4)
DIFFERENTIAL METHOD BLD: ABNORMAL
EOSINOPHIL # BLD AUTO: 0.1 K/UL (ref 0–0.5)
EOSINOPHIL NFR BLD: 1.2 % (ref 0–8)
ERYTHROCYTE [DISTWIDTH] IN BLOOD BY AUTOMATED COUNT: 12.6 % (ref 11.5–14.5)
EST. GFR  (NO RACE VARIABLE): >60 ML/MIN/1.73 M^2
GLUCOSE SERPL-MCNC: 101 MG/DL (ref 70–110)
HCT VFR BLD AUTO: 28.2 % (ref 37–48.5)
HGB BLD-MCNC: 9.7 G/DL (ref 12–16)
IMM GRANULOCYTES # BLD AUTO: 0.03 K/UL (ref 0–0.04)
IMM GRANULOCYTES NFR BLD AUTO: 0.5 % (ref 0–0.5)
LYMPHOCYTES # BLD AUTO: 1.7 K/UL (ref 1–4.8)
LYMPHOCYTES NFR BLD: 29.5 % (ref 18–48)
MAGNESIUM SERPL-MCNC: 1.7 MG/DL (ref 1.6–2.6)
MCH RBC QN AUTO: 32.9 PG (ref 27–31)
MCHC RBC AUTO-ENTMCNC: 34.4 G/DL (ref 32–36)
MCV RBC AUTO: 96 FL (ref 82–98)
MONOCYTES # BLD AUTO: 0.4 K/UL (ref 0.3–1)
MONOCYTES NFR BLD: 6.2 % (ref 4–15)
NEUTROPHILS # BLD AUTO: 3.6 K/UL (ref 1.8–7.7)
NEUTROPHILS NFR BLD: 62.4 % (ref 38–73)
NRBC BLD-RTO: 0 /100 WBC
PHOSPHATE SERPL-MCNC: 2.7 MG/DL (ref 2.7–4.5)
PLATELET # BLD AUTO: 186 K/UL (ref 150–450)
PMV BLD AUTO: 10.6 FL (ref 9.2–12.9)
POCT GLUCOSE: 112 MG/DL (ref 70–110)
POCT GLUCOSE: 94 MG/DL (ref 70–110)
POCT GLUCOSE: 97 MG/DL (ref 70–110)
POTASSIUM SERPL-SCNC: 3.6 MMOL/L (ref 3.5–5.1)
RBC # BLD AUTO: 2.95 M/UL (ref 4–5.4)
SODIUM SERPL-SCNC: 127 MMOL/L (ref 136–145)
WBC # BLD AUTO: 5.77 K/UL (ref 3.9–12.7)

## 2024-11-12 PROCEDURE — 83735 ASSAY OF MAGNESIUM: CPT

## 2024-11-12 PROCEDURE — 25000003 PHARM REV CODE 250: Performed by: NURSE PRACTITIONER

## 2024-11-12 PROCEDURE — 36415 COLL VENOUS BLD VENIPUNCTURE: CPT

## 2024-11-12 PROCEDURE — 85025 COMPLETE CBC W/AUTO DIFF WBC: CPT | Performed by: INTERNAL MEDICINE

## 2024-11-12 PROCEDURE — 84100 ASSAY OF PHOSPHORUS: CPT

## 2024-11-12 PROCEDURE — 80048 BASIC METABOLIC PNL TOTAL CA: CPT

## 2024-11-12 PROCEDURE — 94761 N-INVAS EAR/PLS OXIMETRY MLT: CPT

## 2024-11-12 PROCEDURE — 25000003 PHARM REV CODE 250

## 2024-11-12 PROCEDURE — 25000003 PHARM REV CODE 250: Performed by: INTERNAL MEDICINE

## 2024-11-12 RX ORDER — SPIRONOLACTONE 25 MG/1
50 TABLET ORAL DAILY
Status: DISCONTINUED | OUTPATIENT
Start: 2024-11-13 | End: 2024-11-12 | Stop reason: HOSPADM

## 2024-11-12 RX ORDER — METOPROLOL SUCCINATE 25 MG/1
25 TABLET, EXTENDED RELEASE ORAL DAILY
Status: DISCONTINUED | OUTPATIENT
Start: 2024-11-12 | End: 2024-11-12 | Stop reason: HOSPADM

## 2024-11-12 RX ORDER — SPIRONOLACTONE 50 MG/1
50 TABLET, FILM COATED ORAL DAILY
Qty: 30 TABLET | Refills: 11 | Status: SHIPPED | OUTPATIENT
Start: 2024-11-13 | End: 2025-11-13

## 2024-11-12 RX ADMIN — CLOPIDOGREL BISULFATE 75 MG: 75 TABLET ORAL at 08:11

## 2024-11-12 RX ADMIN — MUPIROCIN: 20 OINTMENT TOPICAL at 08:11

## 2024-11-12 RX ADMIN — ALLOPURINOL 300 MG: 100 TABLET ORAL at 08:11

## 2024-11-12 RX ADMIN — PANTOPRAZOLE SODIUM 40 MG: 40 TABLET, DELAYED RELEASE ORAL at 06:11

## 2024-11-12 RX ADMIN — ALUMINUM HYDROXIDE, MAGNESIUM HYDROXIDE, AND SIMETHICONE 30 ML: 200; 200; 20 SUSPENSION ORAL at 06:11

## 2024-11-12 RX ADMIN — ALUMINUM HYDROXIDE, MAGNESIUM HYDROXIDE, AND SIMETHICONE 30 ML: 200; 200; 20 SUSPENSION ORAL at 04:11

## 2024-11-12 RX ADMIN — SPIRONOLACTONE 100 MG: 25 TABLET ORAL at 08:11

## 2024-11-12 RX ADMIN — ASPIRIN 81 MG CHEWABLE TABLET 81 MG: 81 TABLET CHEWABLE at 08:11

## 2024-11-12 RX ADMIN — ALUMINUM HYDROXIDE, MAGNESIUM HYDROXIDE, AND SIMETHICONE 30 ML: 200; 200; 20 SUSPENSION ORAL at 11:11

## 2024-11-12 RX ADMIN — METOPROLOL SUCCINATE 25 MG: 25 TABLET, EXTENDED RELEASE ORAL at 09:11

## 2024-11-12 NOTE — CARE UPDATE
11/11/24 1954   PRE-TX-O2   Device (Oxygen Therapy) room air   SpO2 100 %   $ Pulse Oximetry - Multiple Charge Pulse Oximetry - Multiple

## 2024-11-12 NOTE — ASSESSMENT & PLAN NOTE
-EKG showed picture of NSTEMI with elevated troponin.  -started on heparin drip and DA PT, cardiology consulted appreciate recs  -planning for cardiac angio on 11/11  TTE:     Left Ventricle: The left ventricle is normal in size. There is mild concentric hypertrophy. Regional wall motion abnormalities present. See diagram for wall motion findings. There is mildly reduced systolic function with a visually estimated ejection fraction of 45 - 50%. Biplane (2D) method of discs ejection fraction is 53%. Grade I diastolic dysfunction.    Right Ventricle: Normal right ventricular cavity size. Systolic function is normal.    Aortic Valve: The aortic valve is a trileaflet valve. There is no stenosis. Aortic valve peak velocity is 1.1 m/s. Mean gradient is 2.6 mmHg. There is no significant regurgitation.    Mitral Valve: The mitral valve is structurally normal. There is mild to moderate regurgitation.    Aorta: Aortic root is normal in size measuring 3.30 cm.    Pulmonary Artery: The estimated pulmonary artery systolic pressure is 23 mmHg.    IVC/SVC: Normal venous pressure at 3 mmHg.    Consult cardiology-appreciate recs-continue DA PT and heparin drip, planning for [facility]

## 2024-11-12 NOTE — DISCHARGE SUMMARY
[facility] Medicine  Discharge Summary      Patient Name: José Miguel Meng  MRN: [id number]  CHANCE: [id number]  Patient Class: IP- Inpatient  Admission Date: 11/9/2024  Hospital Length of Stay: 3 days  Discharge Date and Time: 11/12/2024  5:07 PM  Attending Physician: Jenna Vieyra*   Discharging Provider: Jenna Vieyra MD  Primary Care Provider: Steve Skelton III, MD    Primary Care Team: Networked reference to record PCT     HPI:   49-year-old  female with significant past medical history of hypertension that was transferred from [facility] emergency room with complaint of midsternal chest pain that began yesterday and has been constant ever since.  She also had 1 episode of nausea and vomiting yesterday but none today.  She has not been worked up in the past for myocardial infarction.  She has no prior history of MI or coronary artery disease.  She does have acid reflux for which she takes Protonix but the chest pain feels different , more like pressure.  She also admits to episodes of lightheadedness and dizziness but denies that currently. She admits to being extremely anxious and has been going through lot of stress lately.  Her chest pain was  a 9/10 at presentation but is currently resolved. The pain does not radiate. She has taken ibuprofen on 2 occasions since the symptoms began and that does help but very little. There are no exacerbating factors. She denies any shortness for breath, left arm pain, jaw pain, current nausea. She denies any history of DVT/PE, recent surgery, recent travel, hormone use, cough, hemoptysis, unilateral leg swelling/redness/warmth /tenderness.   EKG from the pharynx center showed nonspecific ST changes and troponin was elevated at 0.355.      Procedure(s) (LRB):  Left heart cath (Left)  Ventriculogram, Left      Hospital Course:   No notes on file     Goals of Care Treatment Preferences:  Code Status: Full  Code      SDOH Screening:  The patient was screened for utility difficulties, food insecurity, transport difficulties, housing insecurity, and interpersonal safety and there were no concerns identified this admission.     Consults:   Consults (From admission, onward)          Status Ordering Provider     Cardiology-[facility]  Once        Provider:  (Not yet assigned)    Completed ADONAY SIMMONS            Cardiac/Vascular  * NSTEMI (non-ST elevated myocardial infarction)  -EKG showed picture of NSTEMI with elevated troponin.  -started on heparin drip and DA PT, cardiology consulted appreciate recs  -planning for cardiac angio on 11/11  TTE:     Left Ventricle: The left ventricle is normal in size. There is mild concentric hypertrophy. Regional wall motion abnormalities present. See diagram for wall motion findings. There is mildly reduced systolic function with a visually estimated ejection fraction of 45 - 50%. Biplane (2D) method of discs ejection fraction is 53%. Grade I diastolic dysfunction.    Right Ventricle: Normal right ventricular cavity size. Systolic function is normal.    Aortic Valve: The aortic valve is a trileaflet valve. There is no stenosis. Aortic valve peak velocity is 1.1 m/s. Mean gradient is 2.6 mmHg. There is no significant regurgitation.    Mitral Valve: The mitral valve is structurally normal. There is mild to moderate regurgitation.    Aorta: Aortic root is normal in size measuring 3.30 cm.    Pulmonary Artery: The estimated pulmonary artery systolic pressure is 23 mmHg.    IVC/SVC: Normal venous pressure at 3 mmHg.    Consult cardiology-appreciate recs-continue DA PT and heparin drip, planning for [facility]    Hyperlipidemia  Lab Results   Component Value Date    CHOL 235 (H) 11/10/2024    CHOL 228 (H) 06/03/2024    CHOL 236 (H) 11/28/2023     Lab Results   Component Value Date     (H) 11/10/2024    HDL 60 06/03/2024    HDL 64 11/28/2023     Lab Results   Component Value Date    LDLCALC  100.4 11/10/2024    LDLCALC Invalid, Trig>400.0 06/03/2024    LDLCALC Invalid, Trig>400.0 11/28/2023     Lab Results   Component Value Date    TRIG 138 11/10/2024    TRIG 437 (H) 06/03/2024    TRIG 419 (H) 11/28/2023     Plan:  -started her on statin  -cardiac diet  -we will monitor closely        Chest pain  Resolved  -has nitro sublingual  -EKG shows picture of NSTEMI  -currently on heparin drip, DA PT, pending echo.  -cardiology consulted, appreciate recs  -planning for cardiac angio on 11/11      Essential hypertension  Patients blood pressure range in the last 24 hours was: BP  Min: 99/74  Max: 127/88.The patient's inpatient anti-hypertensive regimen is listed below:  Current Antihypertensives  nitroGLYCERIN SL tablet 0.4 mg, Every 5 min PRN, Sublingual  metoprolol succinate (TOPROL-XL) 24 hr tablet, Daily, Oral  nitroGLYCERIN (NITROSTAT) SL tablet, Every 5 min PRN, Sublingual  spironolactone tablet 50 mg, Daily, Oral  metoprolol succinate (TOPROL-XL) 24 hr tablet 25 mg, Daily, Oral    Plan  - BP is controlled, no changes needed to their regimen  - we will monitor closely    Endocrine  Hyponatremia  Hyponatremia is likely due to unknown. The patient's most recent sodium results are listed below.  Recent Labs     11/09/24  1338 11/11/24  0322 11/12/24  0148   * 125* 127*     Plan  - Correct the sodium by 4-6mEq in 24 hours.   - Obtain the following studies: Urine sodium, urine osmolality, serum osmolality.  - Will treat the hyponatremia with Fluid restriction of:  1.5 liter per day  - Monitor sodium Daily.   - Patient hyponatremia is improving  -

## 2024-11-12 NOTE — PLAN OF CARE
"  Problem: Adult Inpatient Plan of Care  Goal: Plan of Care Review  Outcome: Progressing     Problem: Adult Inpatient Plan of Care  Goal: Optimal Comfort and Wellbeing  Outcome: Progressing     Problem: Chest Pain  Goal: Resolution of Chest Pain Symptoms  Outcome: Progressing     Problem: Acute Coronary Syndrome  Goal: Optimal Adaptation to Illness  Outcome: Progressing       .Plan of care reviewed with the patient. Scheduled medicines given and the patient tolerated well. Fall and safety precautions taken and the standard interventions are in place. On Telemetry monitoring with NSR, no true "red alarms' noted, no acute distress reported either on the shift. Patient's Accu Check was 95 mg/dl.  Advised the patient to call for assistance. Continued monitoring the patient.   "

## 2024-11-12 NOTE — PLAN OF CARE
went to meet with patient. Patient's Arvin Allison at bedside. Patient reports MD had rounded on her this morning, and anticipated to discharge today. No DME or Home Health needs noted. Patient has MyChart and is aware of PCP follow-up scheduled. I updated her message also sent to Cardiology office for post-op follow-up appointment. Per Chart review, MD office is working on follow-up. Family will transport home at discharge. No further Case Management needs noted.    1530--Cardiology follow-up scheduled.    Other Contacts    Name Relation Home Work Mobile   Keegan Lawrence Significant other [phone]   Myah Meng   [phone]     Future Appointments   Date Time Provider Department Center   11/18/2024  9:30 AM Emmie Frausto PA-C [facility]   12/5/2024  8:00 AM LAB, [facility] LAB [place]   12/12/2024 10:00 AM Jorden Fong MD [facility] CARDIO [place]   1/14/2025 11:00 AM Steve Skelton III, MD [facility] [place]         11/12/24 0952   Final Note   Assessment Type Final Discharge Note   Anticipated Discharge Disposition Home   Hospital Resources/Appts/Education Provided Appointments scheduled and added to AVS   Post-Acute Status   Discharge Delays None known at this time     Rocio Alicea RN    [phone]

## 2024-11-12 NOTE — ASSESSMENT & PLAN NOTE
Hyponatremia is likely due to unknown. The patient's most recent sodium results are listed below.  Recent Labs     11/09/24  1338 11/11/24  0322 11/12/24  0148   * 125* 127*     Plan  - Correct the sodium by 4-6mEq in 24 hours.   - Obtain the following studies: Urine sodium, urine osmolality, serum osmolality.  - Will treat the hyponatremia with Fluid restriction of:  1.5 liter per day  - Monitor sodium Daily.   - Patient hyponatremia is improving  -

## 2024-11-12 NOTE — ASSESSMENT & PLAN NOTE
Patients blood pressure range in the last 24 hours was: BP  Min: 99/74  Max: 127/88.The patient's inpatient anti-hypertensive regimen is listed below:  Current Antihypertensives  nitroGLYCERIN SL tablet 0.4 mg, Every 5 min PRN, Sublingual  metoprolol succinate (TOPROL-XL) 24 hr tablet, Daily, Oral  nitroGLYCERIN (NITROSTAT) SL tablet, Every 5 min PRN, Sublingual  spironolactone tablet 50 mg, Daily, Oral  metoprolol succinate (TOPROL-XL) 24 hr tablet 25 mg, Daily, Oral    Plan  - BP is controlled, no changes needed to their regimen  - we will monitor closely

## 2024-11-12 NOTE — SUBJECTIVE & OBJECTIVE
"Interval History:  Awake, alert, denies chest pain report occasional palpitation  S/p C on 11/11      Review of Systems   Constitutional:  Positive for activity change.   Respiratory:  Negative for chest tightness and shortness of breath.    Cardiovascular:  Negative for chest pain and leg swelling.   Psychiatric/Behavioral:  Negative for agitation.      Objective:     Vital Signs (Most Recent):  Temp: 98.1 °F (36.7 °C) (11/12/24 0749)  Pulse: 107 (11/12/24 0749)  Resp: 20 (11/12/24 0749)  BP: 118/85 (11/12/24 0749)  SpO2: 100 % (11/12/24 0749) Vital Signs (24h Range):  Temp:  [97.8 °F (36.6 °C)-98.2 °F (36.8 °C)] 98.1 °F (36.7 °C)  Pulse:  [] 107  Resp:  [13-26] 20  SpO2:  [99 %-100 %] 100 %  BP: ()/(69-88) 118/85     Weight: 74.7 kg (164 lb 10.9 oz)  Body mass index is 25.04 kg/m².  No intake or output data in the 24 hours ending 11/12/24 0750      Physical Exam  Constitutional:       General: She is not in acute distress.  Cardiovascular:      Rate and Rhythm: Normal rate and regular rhythm.   Pulmonary:      Effort: Pulmonary effort is normal. No respiratory distress.      Breath sounds: Normal breath sounds.   Abdominal:      General: Abdomen is flat.      Palpations: Abdomen is soft.   Neurological:      Mental Status: She is alert and oriented to person, place, and time.             Significant Labs: A1C:   Recent Labs   Lab 06/03/24  0809   HGBA1C 5.0     ABGs: No results for input(s): "PH", "PCO2", "HCO3", "POCSATURATED", "BE", "TOTALHB", "COHB", "METHB", "O2HB", "POCFIO2", "PO2" in the last 48 hours.  Blood Culture: No results for input(s): "LABBLOO" in the last 48 hours.  CBC:   Recent Labs   Lab 11/11/24  0321 11/12/24  0148   WBC 6.48 5.77   HGB 10.0* 9.7*   HCT 29.2* 28.2*    186     CMP:   Recent Labs   Lab 11/11/24  0322 11/12/24  0148   * 127*   K 3.8 3.6   CL 98 100   CO2 17* 17*   GLU 98 101   BUN 10 7   CREATININE 1.2 0.9   CALCIUM 9.2 9.1   ANIONGAP 10 10     Cardiac " "Markers: No results for input(s): "CKMB", "MYOGLOBIN", "BNP", "TROPISTAT" in the last 48 hours.  Coagulation:   Recent Labs   Lab 11/11/24  0321   APTT 54.2*     Lipase: No results for input(s): "LIPASE" in the last 48 hours.  Lipid Panel: No results for input(s): "CHOL", "HDL", "LDLCALC", "TRIG", "CHOLHDL" in the last 48 hours.  Magnesium:   Recent Labs   Lab 11/11/24 0322 11/12/24  0148   MG 1.5* 1.7     Troponin: No results for input(s): "TROPONINI", "TROPONINIHS" in the last 48 hours.  TSH: No results for input(s): "TSH" in the last 4320 hours.  Urine Culture: No results for input(s): "LABURIN" in the last 48 hours.  Urine Studies: No results for input(s): "COLORU", "APPEARANCEUA", "PHUR", "SPECGRAV", "PROTEINUA", "GLUCUA", "KETONESU", "BILIRUBINUA", "OCCULTUA", "NITRITE", "UROBILINOGEN", "LEUKOCYTESUR", "RBCUA", "WBCUA", "BACTERIA", "SQUAMEPITHEL", "HYALINECASTS" in the last 48 hours.    Invalid input(s): "WRIGHTSUR"    Significant Imaging: I have reviewed all pertinent imaging results/findings within the past 24 hours.  "

## 2024-11-12 NOTE — PROGRESS NOTES
[facility] Medicine  Progress Note    Patient Name: José Miguel Meng  MRN: [id number]  Patient Class: IP- Inpatient   Admission Date: 11/9/2024  Length of Stay: 3 days  Attending Physician: Jenna Vieyra*  Primary Care Provider: Steve Skelton III, MD        Subjective:     Principal Problem:NSTEMI (non-ST elevated myocardial infarction)        HPI:  49-year-old  female with significant past medical history of hypertension that was transferred from [facility] emergency room with complaint of midsternal chest pain that began yesterday and has been constant ever since.  She also had 1 episode of nausea and vomiting yesterday but none today.  She has not been worked up in the past for myocardial infarction.  She has no prior history of MI or coronary artery disease.  She does have acid reflux for which she takes Protonix but the chest pain feels different , more like pressure.  She also admits to episodes of lightheadedness and dizziness but denies that currently. She admits to being extremely anxious and has been going through lot of stress lately.  Her chest pain was  a 9/10 at presentation but is currently resolved. The pain does not radiate. She has taken ibuprofen on 2 occasions since the symptoms began and that does help but very little. There are no exacerbating factors. She denies any shortness for breath, left arm pain, jaw pain, current nausea. She denies any history of DVT/PE, recent surgery, recent travel, hormone use, cough, hemoptysis, unilateral leg swelling/redness/warmth /tenderness.   EKG from the pharynx center showed nonspecific ST changes and troponin was elevated at 0.355.      Overview/Hospital Course:  No notes on file    Interval History:  Awake, alert, denies chest pain report occasional palpitation  S/p LHC on 11/11      Review of Systems   Constitutional:  Positive for activity change.   Respiratory:  Negative for chest tightness and  "shortness of breath.    Cardiovascular:  Negative for chest pain and leg swelling.   Psychiatric/Behavioral:  Negative for agitation.      Objective:     Vital Signs (Most Recent):  Temp: 98.1 °F (36.7 °C) (11/12/24 0749)  Pulse: 107 (11/12/24 0749)  Resp: 20 (11/12/24 0749)  BP: 118/85 (11/12/24 0749)  SpO2: 100 % (11/12/24 0749) Vital Signs (24h Range):  Temp:  [97.8 °F (36.6 °C)-98.2 °F (36.8 °C)] 98.1 °F (36.7 °C)  Pulse:  [] 107  Resp:  [13-26] 20  SpO2:  [99 %-100 %] 100 %  BP: ()/(69-88) 118/85     Weight: 74.7 kg (164 lb 10.9 oz)  Body mass index is 25.04 kg/m².  No intake or output data in the 24 hours ending 11/12/24 0750      Physical Exam  Constitutional:       General: She is not in acute distress.  Cardiovascular:      Rate and Rhythm: Normal rate and regular rhythm.   Pulmonary:      Effort: Pulmonary effort is normal. No respiratory distress.      Breath sounds: Normal breath sounds.   Abdominal:      General: Abdomen is flat.      Palpations: Abdomen is soft.   Neurological:      Mental Status: She is alert and oriented to person, place, and time.             Significant Labs: A1C:   Recent Labs   Lab 06/03/24  0809   HGBA1C 5.0     ABGs: No results for input(s): "PH", "PCO2", "HCO3", "POCSATURATED", "BE", "TOTALHB", "COHB", "METHB", "O2HB", "POCFIO2", "PO2" in the last 48 hours.  Blood Culture: No results for input(s): "LABBLOO" in the last 48 hours.  CBC:   Recent Labs   Lab 11/11/24 0321 11/12/24 0148   WBC 6.48 5.77   HGB 10.0* 9.7*   HCT 29.2* 28.2*    186     CMP:   Recent Labs   Lab 11/11/24 0322 11/12/24 0148   * 127*   K 3.8 3.6   CL 98 100   CO2 17* 17*   GLU 98 101   BUN 10 7   CREATININE 1.2 0.9   CALCIUM 9.2 9.1   ANIONGAP 10 10     Cardiac Markers: No results for input(s): "CKMB", "MYOGLOBIN", "BNP", "TROPISTAT" in the last 48 hours.  Coagulation:   Recent Labs   Lab 11/11/24  0321   APTT 54.2*     Lipase: No results for input(s): "LIPASE" in the last 48 " "hours.  Lipid Panel: No results for input(s): "CHOL", "HDL", "LDLCALC", "TRIG", "CHOLHDL" in the last 48 hours.  Magnesium:   Recent Labs   Lab 11/11/24  0322 11/12/24  0148   MG 1.5* 1.7     Troponin: No results for input(s): "TROPONINI", "TROPONINIHS" in the last 48 hours.  TSH: No results for input(s): "TSH" in the last 4320 hours.  Urine Culture: No results for input(s): "LABURIN" in the last 48 hours.  Urine Studies: No results for input(s): "COLORU", "APPEARANCEUA", "PHUR", "SPECGRAV", "PROTEINUA", "GLUCUA", "KETONESU", "BILIRUBINUA", "OCCULTUA", "NITRITE", "UROBILINOGEN", "LEUKOCYTESUR", "RBCUA", "WBCUA", "BACTERIA", "SQUAMEPITHEL", "HYALINECASTS" in the last 48 hours.    Invalid input(s): "WRIGHTSUR"    Significant Imaging: I have reviewed all pertinent imaging results/findings within the past 24 hours.    Assessment/Plan:      * NSTEMI (non-ST elevated myocardial infarction)  -EKG showed picture of NSTEMI with elevated troponin.  -started on heparin drip and DA PT, cardiology consulted appreciate recs  -planning for cardiac angio on 11/11  TTE:     Left Ventricle: The left ventricle is normal in size. There is mild concentric hypertrophy. Regional wall motion abnormalities present. See diagram for wall motion findings. There is mildly reduced systolic function with a visually estimated ejection fraction of 45 - 50%. Biplane (2D) method of discs ejection fraction is 53%. Grade I diastolic dysfunction.    Right Ventricle: Normal right ventricular cavity size. Systolic function is normal.    Aortic Valve: The aortic valve is a trileaflet valve. There is no stenosis. Aortic valve peak velocity is 1.1 m/s. Mean gradient is 2.6 mmHg. There is no significant regurgitation.    Mitral Valve: The mitral valve is structurally normal. There is mild to moderate regurgitation.    Aorta: Aortic root is normal in size measuring 3.30 cm.    Pulmonary Artery: The estimated pulmonary artery systolic pressure is 23 mmHg.    " IVC/SVC: Normal venous pressure at 3 mmHg.    Consult cardiology-appreciate recs-continue DA PT and heparin drip, planning for [facility]    Hyperlipidemia  Lab Results   Component Value Date    CHOL 235 (H) 11/10/2024    CHOL 228 (H) 06/03/2024    CHOL 236 (H) 11/28/2023     Lab Results   Component Value Date     (H) 11/10/2024    HDL 60 06/03/2024    HDL 64 11/28/2023     Lab Results   Component Value Date    LDLCALC 100.4 11/10/2024    LDLCALC Invalid, Trig>400.0 06/03/2024    LDLCALC Invalid, Trig>400.0 11/28/2023     Lab Results   Component Value Date    TRIG 138 11/10/2024    TRIG 437 (H) 06/03/2024    TRIG 419 (H) 11/28/2023     Plan:  -started her on statin  -cardiac diet  -we will monitor closely        Chest pain  Resolved  -has nitro sublingual  -EKG shows picture of NSTEMI  -currently on heparin drip, DA PT, pending echo.  -cardiology consulted, appreciate recs  -planning for cardiac angio on 11/11      Hyponatremia  Hyponatremia is likely due to unknown. The patient's most recent sodium results are listed below.  Recent Labs     11/09/24  1338 11/11/24  0322 11/12/24  0148   * 125* 127*     Plan  - Correct the sodium by 4-6mEq in 24 hours.   - Obtain the following studies: Urine sodium, urine osmolality, serum osmolality.  - Will treat the hyponatremia with Fluid restriction of:  1.5 liter per day  - Monitor sodium Daily.   - Patient hyponatremia is improving  -     Essential hypertension  Patients blood pressure range in the last 24 hours was: BP  Min: 99/74  Max: 127/88.The patient's inpatient anti-hypertensive regimen is listed below:  Current Antihypertensives  nitroGLYCERIN SL tablet 0.4 mg, Every 5 min PRN, Sublingual  metoprolol succinate (TOPROL-XL) 24 hr tablet, Daily, Oral  nitroGLYCERIN (NITROSTAT) SL tablet, Every 5 min PRN, Sublingual  spironolactone tablet 50 mg, Daily, Oral  metoprolol succinate (TOPROL-XL) 24 hr tablet 25 mg, Daily, Oral    Plan  - BP is controlled, no changes  needed to their regimen  - we will monitor closely      VTE Risk Mitigation (From admission, onward)           Ordered     heparin infusion 1,000 units/500 ml in 0.9% NaCl (pressure line flush)  Intra-op continuous PRN         11/11/24 1230     Place sequential compression device  Until discontinued         11/09/24 2128     IP VTE LOW RISK PATIENT  Once         11/09/24 2128                    Discharge Planning   DELMI: 11/12/2024     Code Status: Full Code   Is the patient medically ready for discharge?:     Reason for patient still in hospital (select all that apply): Pending disposition  Discharge Plan A: Home   Discharge Delays: None known at this time              Jenna Vieyra MD  Department of Hospital Medicine   [place] [facility]

## 2024-11-12 NOTE — PROGRESS NOTES
AVS prepared from VN standpoint   11/12/24 6571   Nurse Notification   Charge Nurse Notified? Yes   Name of Charge Nurse ramón/Nuzhat   Bedside Nurse Notified? Yes   Name of Bedside Nurse Geneva Prince Notfication Method Secure Chat   Nurse Notified Of Orders    Notification    Notified? Yes   Name of  Rocio PEREZ    Notification Method Secure Chat    Notified Of Discharge Status

## 2024-11-12 NOTE — PROGRESS NOTES
Virtual Nurse:Discharge orders noted; additional clinical references attached.  notified.  Patient's discharge instruction packet given by bedside RN.    Cued into patient's room.  Permission received per patient to turn camera to view patient.  Introduced as VN that will be instructing on discharge instructions.  Family member at bedside.  Educated patient on reason for admission; medications to hold, continue, and start, appointment to follow-up with doctor, and when to return to ED. Teach back method used. Verbalized understanding  Number given for 24/7 Nurse Line. Opportunity given for questions and questions answered.  Bedside nurse updated. Transport to [facility] requested.        11/12/24 1652   Shift   Virtual Nurse - Patient Verbalized Approval Of VN Rounding;Camera Use   Type of Frequent Check   Type Patient Rounds   Safety/Activity   Patient Rounds visualized patient   Activity Assistance Provided independent

## 2024-11-13 LAB
OHS QRS DURATION: 72 MS
OHS QRS DURATION: 80 MS
OHS QTC CALCULATION: 506 MS
OHS QTC CALCULATION: 538 MS

## 2024-11-18 ENCOUNTER — OFFICE VISIT (OUTPATIENT)
Dept: FAMILY MEDICINE | Facility: CLINIC | Age: 49
End: 2024-11-18
Payer: MEDICAID

## 2024-11-18 VITALS
WEIGHT: 173.94 LBS | HEART RATE: 70 BPM | BODY MASS INDEX: 26.36 KG/M2 | OXYGEN SATURATION: 96 % | DIASTOLIC BLOOD PRESSURE: 70 MMHG | SYSTOLIC BLOOD PRESSURE: 98 MMHG | HEIGHT: 68 IN

## 2024-11-18 DIAGNOSIS — I10 ESSENTIAL HYPERTENSION: ICD-10-CM

## 2024-11-18 DIAGNOSIS — F41.9 ANXIETY: ICD-10-CM

## 2024-11-18 DIAGNOSIS — I21.4 NSTEMI (NON-ST ELEVATED MYOCARDIAL INFARCTION): Primary | ICD-10-CM

## 2024-11-18 PROCEDURE — 99999 PR PBB SHADOW E&M-EST. PATIENT-LVL V: CPT | Mod: PBBFAC,,,

## 2024-11-18 PROCEDURE — 99215 OFFICE O/P EST HI 40 MIN: CPT | Mod: PBBFAC,PO

## 2024-11-18 RX ORDER — ESCITALOPRAM OXALATE 5 MG/1
5 TABLET ORAL DAILY
Qty: 90 TABLET | Refills: 3 | Status: SHIPPED | OUTPATIENT
Start: 2024-11-18 | End: 2025-11-18

## 2024-11-18 NOTE — PROGRESS NOTES
[facility]    11/18/2024      Subjective:       Patient ID:  José Miguel is a 49 y.o. female .  has a past medical history of Hypertension.    History of Present Illness    CHIEF COMPLAINT:  José Miguel presents for follow-up after a recent hospitalization for chest pain, expressing concerns about severe anxiety and difficulty sleeping since discharge.    HPI:  Hospital follow up       Hospital Discharge Date: 11/12/2024   Hospital Follow up within two days of discharge: no       Family and/or Caretaker present at visit?  No.  Diagnostic tests reviewed/disposition: I have reviewed all completed as well as pending diagnostic tests at the time of discharge.  Disease/illness education: Yes   Home health/community services discussion/referrals: Patient does not have home health established from hospital visit.  They do not need home health.  If needed, we will set up home health for the patient.   Establishment or re-establishment of referral orders for community resources: No other necessary community resources.   Discussion with other health care providers: No discussion with other health care providers necessary.     Admin/Discharge:     Reason for admission: NSTEMI     Brief History:   49-year-old  female with significant past medical history of hypertension that was transferred from [facility] emergency room with complaint of midsternal chest pain that began yesterday and has been constant ever since.  She also had 1 episode of nausea and vomiting yesterday but none today.  She has not been worked up in the past for myocardial infarction.  She has no prior history of MI or coronary artery disease.  She does have acid reflux for which she takes Protonix but the chest pain feels different , more like pressure.  She also admits to episodes of lightheadedness and dizziness but denies that currently. She admits to  being extremely anxious and has been going through lot of stress lately.  Her chest pain was  a 9/10 at presentation but is currently resolved. The pain does not radiate. She has taken ibuprofen on 2 occasions since the symptoms began and that does help but very little. There are no exacerbating factors. She denies any shortness for breath, left arm pain, jaw pain, current nausea. She denies any history of DVT/PE, recent surgery, recent travel, hormone use, cough, hemoptysis, unilateral leg swelling/redness/warmth /tenderness.   EKG from the pharynx center showed nonspecific ST changes and troponin was elevated at 0.355.       Procedure(s) (LRB):  Left heart cath (Left)  Ventriculogram, Left      Pertinent Lab:   Hyperlipidemia        Lab Results   Component Value Date     CHOL 235 (H) 11/10/2024     CHOL 228 (H) 06/03/2024     CHOL 236 (H) 11/28/2023            Lab Results   Component Value Date      (H) 11/10/2024     HDL 60 06/03/2024     HDL 64 11/28/2023            Lab Results   Component Value Date     LDLCALC 100.4 11/10/2024     LDLCALC Invalid, Trig>400.0 06/03/2024     LDLCALC Invalid, Trig>400.0 11/28/2023            Lab Results   Component Value Date     TRIG 138 11/10/2024     TRIG 437 (H) 06/03/2024     TRIG 419 (H) 11/28/2023       Pertinent Imaging;  -EKG showed picture of NSTEMI with elevated troponin.  -started on heparin drip and DA PT, cardiology consulted appreciate recs  -planning for cardiac angio on 11/11  TTE:     Left Ventricle: The left ventricle is normal in size. There is mild concentric hypertrophy. Regional wall motion abnormalities present. See diagram for wall motion findings. There is mildly reduced systolic function with a visually estimated ejection fraction of 45 - 50%. Biplane (2D) method of discs ejection fraction is 53%. Grade I diastolic dysfunction.    Right Ventricle: Normal right ventricular cavity size. Systolic function is normal.    Aortic Valve: The aortic valve  is a trileaflet valve. There is no stenosis. Aortic valve peak velocity is 1.1 m/s. Mean gradient is 2.6 mmHg. There is no significant regurgitation.    Mitral Valve: The mitral valve is structurally normal. There is mild to moderate regurgitation.    Aorta: Aortic root is normal in size measuring 3.30 cm.    Pulmonary Artery: The estimated pulmonary artery systolic pressure is 23 mmHg.    IVC/SVC: Normal venous pressure at 3 mmHg.     Consult cardiology-appreciate recs-continue DA PT and heparin drip, planning for LHC  - Cath findings   Right dominant coronary circulation   No significant obstructive coronary artery disease of left main coronary artery, left circumflex artery, lad, right coronary artery   EF of around 40-45% with hypokinesis of the apical to mid LV wall consistent with takotsubo cardiomyopathy. There is hyperkinesis of the basal LV walls   No significant gradient across the aortic valve.  LVEDP of 6 mm Hg    Discharge Plan;    Continue dual antiplatelet therapy for at least 1 year.    Transitioning to Toprol-XL, continue Aldactone, add losartan on outpatient basis as blood pressure allows.  Discharge pertinent meds:  nitroGLYCERIN SL tablet 0.4 mg, Every 5 min PRN, Sublingual  metoprolol succinate (TOPROL-XL) 24 hr tablet, Daily, Oral  nitroGLYCERIN (NITROSTAT) SL tablet, Every 5 min PRN, Sublingual  spironolactone tablet 50 mg, Daily, Oral  metoprolol succinate (TOPROL-XL) 24 hr tablet 25 mg, Daily, Oral    Labs/Imaging pending at the time of discharged: N/A    Since Discharge:    José Miguel was recently hospitalized for chest pain and diagnosed with takotsubo cardiomyopathy. Since discharge, she has severe anxiety and difficulty sleeping, which began during her hospital stay. She feels paranoid and nervous about all aspects of her life. She frequently monitors her heart rate, which is normal, but perceives her heartbeat as more pronounced. José Miguel denies current chest pain but notes a weakened  area in her heart identified during hospitalization. She is concerned about the safety of an upcoming international trip in December. José Miguel mentions a history of hormone issues related to menopause and questions if this could contribute to her current symptoms. Her last endocrinology appointment was in August 2023. She denies any current chest pain, nausea, or vomiting.      MEDICATIONS:  José Miguel is on Plavix and Aspirin. She is also taking a statin for cholesterol management. Metoprolol is prescribed for blood pressure control and rapid heartbeats. She is on Aldactone and Losartan.    MEDICAL HISTORY:  José Miguel has a history of Takotsubo cardiomyopathy, menopause, and a thyroid condition.    SURGICAL HISTORY:  During a recent hospitalization, the patient underwent an angiogram which revealed no blockage.    TEST RESULTS:  A troponin test was completed prior to the current visit. An EKG was performed prior to the current visit, showing no signs of infarction. An angiogram was also completed before this visit, confirming no blockage.    IMAGING:  An echocardiogram was conducted prior to the current visit, with normal results.      ROS:  Constitutional: -chills, -fever  Respiratory: -cough, -shortness of breath  Cardiovascular: -chest pain  Gastrointestinal: -abdominal pain, -constipation, -diarrhea, -nausea, -vomiting  Neurological: -dizziness, -lightheadedness, -headaches   Psych: +Anxiety          Patient Active Problem List   Diagnosis    Menometrorrhagia    Macromastia    Normocytic anemia    Other secondary gout, multiple sites    Vitamin D deficiency    Essential hypertension    Class 1 obesity due to excess calories with serious comorbidity and body mass index (BMI) of 33.0 to 33.9 in adult    Prediabetes    Impaired mobility and activities of daily living    Syncope and collapse    Hyponatremia    Leg swelling subjective     Pure hypercholesterolemia    Family history of early CAD    Chest pain    NSTEMI  (non-ST elevated myocardial infarction)    Hyperlipidemia         Last HgbA1C:    Lab Results   Component Value Date    HGBA1C 5.0 06/03/2024    HGBA1C 5.5 08/29/2023    HGBA1C 5.4 05/08/2023         Last Lipid Panel:    Lab Results   Component Value Date     (H) 11/10/2024    HDL 60 06/03/2024    HDL 64 11/28/2023       Lab Results   Component Value Date    LDLCALC 100.4 11/10/2024    LDLCALC Invalid, Trig>400.0 06/03/2024    LDLCALC Invalid, Trig>400.0 11/28/2023       Lab Results   Component Value Date    TRIG 138 11/10/2024    TRIG 437 (H) 06/03/2024    TRIG 419 (H) 11/28/2023       Lab Results   Component Value Date    CHOLHDL 45.5 11/10/2024    CHOLHDL 26.3 06/03/2024    CHOLHDL 27.1 11/28/2023         Review of patient's allergies indicates:  No Known Allergies     Medication List with Changes/Refills   New Medications    ESCITALOPRAM OXALATE (LEXAPRO) 5 MG TAB    Take 1 tablet (5 mg total) by mouth once daily.   Current Medications    ALLOPURINOL (ZYLOPRIM) 300 MG TABLET    TAKE 1 TABLET BY MOUTH EVERY DAY    ASPIRIN 81 MG CHEW    Take 1 tablet (81 mg total) by mouth once daily.    ATORVASTATIN (LIPITOR) 40 MG TABLET    Take 1 tablet (40 mg total) by mouth every evening.    CLOPIDOGREL (PLAVIX) 75 MG TABLET    Take 1 tablet (75 mg total) by mouth once daily.    COLCHICINE (COLCRYS) 0.6 MG TABLET    Take 1 tablet (0.6 mg total) by mouth 2 (two) times daily.    INDOMETHACIN (INDOCIN) 50 MG CAPSULE    Take 1 capsule (50 mg total) by mouth 2 (two) times daily with meals.    LOSARTAN (COZAAR) 100 MG TABLET    TAKE 1 TABLET BY MOUTH EVERY DAY    METOPROLOL SUCCINATE (TOPROL-XL) 25 MG 24 HR TABLET    Take 1 tablet (25 mg total) by mouth once daily.    NITROGLYCERIN (NITROSTAT) 0.4 MG SL TABLET    Place 1 tablet (0.4 mg total) under the tongue every 5 (five) minutes as needed for Chest pain.    PANTOPRAZOLE (PROTONIX) 40 MG TABLET    Take 1 tablet (40 mg total) by mouth every morning.    SPIRONOLACTONE  "(ALDACTONE) 50 MG TABLET    Take 1 tablet (50 mg total) by mouth once daily.               Objective:      BP 98/70 (BP Location: Left arm, Patient Position: Sitting)   Pulse 70   Ht 5' 8" (1.727 m)   Wt 78.9 kg (173 lb 15.1 oz)   LMP 04/07/2016 (Exact Date)   SpO2 96%   BMI 26.45 kg/m²   Estimated body mass index is 26.45 kg/m² as calculated from the following:    Height as of this encounter: 5' 8" (1.727 m).    Weight as of this encounter: 78.9 kg (173 lb 15.1 oz).    Physical Exam  Vitals reviewed.   Constitutional:       General: She is not in acute distress.     Appearance: Normal appearance.   HENT:      Head: Normocephalic and atraumatic.      Mouth/Throat:      Mouth: Mucous membranes are moist.   Eyes:      Conjunctiva/sclera: Conjunctivae normal.   Cardiovascular:      Rate and Rhythm: Normal rate and regular rhythm.   Pulmonary:      Effort: Pulmonary effort is normal. No respiratory distress.   Abdominal:      General: Bowel sounds are normal.      Palpations: Abdomen is soft.      Tenderness: There is no abdominal tenderness.   Musculoskeletal:         General: Normal range of motion.      Cervical back: Normal range of motion.   Skin:     General: Skin is warm.   Neurological:      Mental Status: She is alert.   Psychiatric:         Mood and Affect: Mood is anxious.         Behavior: Behavior normal.             Assessment and Plan:     José Miguel was seen today for follow-up.    Diagnoses and all orders for this visit:    NSTEMI (non-ST elevated myocardial infarction)    Anxiety  -     EScitalopram oxalate (LEXAPRO) 5 MG Tab; Take 1 tablet (5 mg total) by mouth once daily.  -     MYC E-Visit  -     Ambulatory referral/consult to Behavioral Health; Future    Essential hypertension          Assessment & Plan    Assessed patient's anxiety symptoms that began during recent hospitalization for takotsubo cardiomyopathy  Reviewed recent cardiac workup, including normal EKG, troponin, and angiogram " showing no coronary artery obstruction  Considered initiation of SSRI for anxiety management  Evaluated appropriateness of upcoming international travel given recent cardiac event    GENERALIZED ANXIETY DISORDER:  Explained SSRI mechanism of action for anxiety management.  Discussed expected timeline for SSRI efficacy (4-8 weeks).  Provided information on anxiety management resources, including [employer] and psychology platforms for finding tailored mental health support.  Started SSRI 5 mg daily for anxiety.  Referred to behavioral health for anxiety management.  Complete MyChart e-visit in 2 weeks to assess SSRI efficacy and consider dose increase to 10 mg if needed.    TAKOTSUBO SYNDROME:  Continued metoprolol for heart rate control and blood pressure management.  Follow up with cardiology in early December.    ESSENTIAL HYPERTENSION:  Continued Aldactone, losartan, Plavix, aspirin, and statin.    FOLLOW-UP:  Follow up in 2 months.         The patient was informed of the following statements     Emergency Care: Seek immediate medical attention in the emergency room if you experience any new or worsening symptoms, or if your current symptoms significantly increase in severity.  Patient Acknowledgment: Patient verbalizes understanding of the plan and agrees to proceed with the recommended care.     Follow Up:  Follow up as scheduled          Other Orders Placed This Visit:  Orders Placed This Encounter   Procedures    Ambulatory referral/consult to Behavioral Health         This note was generated with the assistance of ambient listening technology. Verbal consent was obtained by the patient and accompanying visitor(s) for the recording of patient appointment to facilitate this note. I attest to having reviewed and edited the generated note for accuracy, though some syntax or spelling errors may persist. Please contact the author of this note for any clarification.    Emmie Frausto PA-C

## 2024-11-18 NOTE — PATIENT INSTRUCTIONS
1. [email or web address]  2. [facility] [phone]  3. Medication apps Headspace and insight timer      Psychiatry Resources - [facility]  Ph: [phone] (Call for appointments)    [facility] [place]  [address], [place], [place]   [phone]    [facility]  [address], [place] [address]  [phone]     [address], [place] [address]   [phone]    [facility]  Main Office - [facility]: [address] [place] [address]  [place] Office - [place]: [address] [place] [address]  [phone]    [facility]  [facility]: [address] [place] [address]  [phone]  [facility]: [address], [address] [place], [place] [id number] [phone]     [facility] ( [facility])  * Must reside in [facility] or [address] [address]  [phone]     [facility]  * [email or web address]. [email or web address] see website for schedule of unit  [phone]     [facility]  [email or web address]  Locations:  [address] [facility]  [phone]   [address] [place]  [phone]  [address]  [phone]    [facility]  [address] [place] [address]  [phone]    [facility]  Locations:  [address] [place], [place] [address]  [phone]   [address] [place] [address]  [phone]  [address]  [place] [id number] [phone]   [address] [facility]  [phone]

## 2024-12-06 ENCOUNTER — HOSPITAL ENCOUNTER (OUTPATIENT)
Facility: HOSPITAL | Age: 49
Discharge: HOME | End: 2024-12-07
Attending: EMERGENCY MEDICINE | Admitting: STUDENT IN AN ORGANIZED HEALTH CARE EDUCATION/TRAINING PROGRAM
Payer: MEDICAID

## 2024-12-06 DIAGNOSIS — R07.9 CHEST PAIN: Primary | ICD-10-CM

## 2024-12-06 DIAGNOSIS — I51.81 TAKOTSUBO CARDIOMYOPATHY: ICD-10-CM

## 2024-12-06 DIAGNOSIS — R07.9 CHEST PAIN AT REST: ICD-10-CM

## 2024-12-06 LAB
ALBUMIN SERPL BCP-MCNC: 3.7 G/DL (ref 3.5–5.2)
ALP SERPL-CCNC: 54 U/L (ref 40–150)
ALT SERPL W/O P-5'-P-CCNC: 14 U/L (ref 10–44)
ANION GAP SERPL CALC-SCNC: 8 MMOL/L (ref 8–16)
AST SERPL-CCNC: 30 U/L (ref 10–40)
BASOPHILS # BLD AUTO: 0.02 K/UL (ref 0–0.2)
BASOPHILS NFR BLD: 0.3 % (ref 0–1.9)
BILIRUB SERPL-MCNC: 0.6 MG/DL (ref 0.1–1)
BNP SERPL-MCNC: 14 PG/ML (ref 0–99)
BUN SERPL-MCNC: 11 MG/DL (ref 6–20)
CALCIUM SERPL-MCNC: 9 MG/DL (ref 8.7–10.5)
CHLORIDE SERPL-SCNC: 106 MMOL/L (ref 95–110)
CO2 SERPL-SCNC: 19 MMOL/L (ref 23–29)
CREAT SERPL-MCNC: 1.1 MG/DL (ref 0.5–1.4)
DIFFERENTIAL METHOD BLD: ABNORMAL
EOSINOPHIL # BLD AUTO: 0 K/UL (ref 0–0.5)
EOSINOPHIL NFR BLD: 0.3 % (ref 0–8)
ERYTHROCYTE [DISTWIDTH] IN BLOOD BY AUTOMATED COUNT: 13.1 % (ref 11.5–14.5)
EST. GFR  (NO RACE VARIABLE): >60 ML/MIN/1.73 M^2
GLUCOSE SERPL-MCNC: 97 MG/DL (ref 70–110)
HCT VFR BLD AUTO: 26.7 % (ref 37–48.5)
HGB BLD-MCNC: 9.1 G/DL (ref 12–16)
IMM GRANULOCYTES # BLD AUTO: 0.03 K/UL (ref 0–0.04)
IMM GRANULOCYTES NFR BLD AUTO: 0.5 % (ref 0–0.5)
INFLUENZA A, MOLECULAR: NEGATIVE
INFLUENZA B, MOLECULAR: NEGATIVE
LYMPHOCYTES # BLD AUTO: 2.1 K/UL (ref 1–4.8)
LYMPHOCYTES NFR BLD: 31.8 % (ref 18–48)
MCH RBC QN AUTO: 32.4 PG (ref 27–31)
MCHC RBC AUTO-ENTMCNC: 34.1 G/DL (ref 32–36)
MCV RBC AUTO: 95 FL (ref 82–98)
MONOCYTES # BLD AUTO: 0.4 K/UL (ref 0.3–1)
MONOCYTES NFR BLD: 5.8 % (ref 4–15)
NEUTROPHILS # BLD AUTO: 4 K/UL (ref 1.8–7.7)
NEUTROPHILS NFR BLD: 61.3 % (ref 38–73)
NRBC BLD-RTO: 0 /100 WBC
PLATELET # BLD AUTO: 208 K/UL (ref 150–450)
PMV BLD AUTO: 10.5 FL (ref 9.2–12.9)
POTASSIUM SERPL-SCNC: 4.4 MMOL/L (ref 3.5–5.1)
PROT SERPL-MCNC: 6.9 G/DL (ref 6–8.4)
RBC # BLD AUTO: 2.81 M/UL (ref 4–5.4)
SODIUM SERPL-SCNC: 133 MMOL/L (ref 136–145)
SPECIMEN SOURCE: NORMAL
TROPONIN I SERPL DL<=0.01 NG/ML-MCNC: <0.006 NG/ML (ref 0–0.03)
WBC # BLD AUTO: 6.55 K/UL (ref 3.9–12.7)

## 2024-12-06 PROCEDURE — 87502 INFLUENZA DNA AMP PROBE: CPT

## 2024-12-06 PROCEDURE — G0378 HOSPITAL OBSERVATION PER HR: HCPCS

## 2024-12-06 PROCEDURE — 85025 COMPLETE CBC W/AUTO DIFF WBC: CPT | Performed by: EMERGENCY MEDICINE

## 2024-12-06 PROCEDURE — 84484 ASSAY OF TROPONIN QUANT: CPT | Performed by: EMERGENCY MEDICINE

## 2024-12-06 PROCEDURE — 25000003 PHARM REV CODE 250

## 2024-12-06 PROCEDURE — 25000003 PHARM REV CODE 250: Performed by: EMERGENCY MEDICINE

## 2024-12-06 PROCEDURE — 63600175 PHARM REV CODE 636 W HCPCS

## 2024-12-06 PROCEDURE — 80053 COMPREHEN METABOLIC PANEL: CPT | Performed by: EMERGENCY MEDICINE

## 2024-12-06 PROCEDURE — 96372 THER/PROPH/DIAG INJ SC/IM: CPT

## 2024-12-06 PROCEDURE — 93010 ELECTROCARDIOGRAM REPORT: CPT | Mod: 59,,, | Performed by: INTERNAL MEDICINE

## 2024-12-06 PROCEDURE — 99285 EMERGENCY DEPT VISIT HI MDM: CPT | Mod: 25

## 2024-12-06 PROCEDURE — 83880 ASSAY OF NATRIURETIC PEPTIDE: CPT | Performed by: EMERGENCY MEDICINE

## 2024-12-06 PROCEDURE — 93005 ELECTROCARDIOGRAM TRACING: CPT

## 2024-12-06 RX ORDER — AMOXICILLIN 250 MG
1 CAPSULE ORAL 2 TIMES DAILY
Status: DISCONTINUED | OUTPATIENT
Start: 2024-12-06 | End: 2024-12-07 | Stop reason: HOSPADM

## 2024-12-06 RX ORDER — TALC
6 POWDER (GRAM) TOPICAL NIGHTLY PRN
Status: DISCONTINUED | OUTPATIENT
Start: 2024-12-06 | End: 2024-12-07 | Stop reason: HOSPADM

## 2024-12-06 RX ORDER — HEPARIN SODIUM 5000 [USP'U]/ML
5000 INJECTION, SOLUTION INTRAVENOUS; SUBCUTANEOUS EVERY 8 HOURS
Status: DISCONTINUED | OUTPATIENT
Start: 2024-12-06 | End: 2024-12-07 | Stop reason: HOSPADM

## 2024-12-06 RX ORDER — HYDROCODONE BITARTRATE AND ACETAMINOPHEN 5; 325 MG/1; MG/1
1 TABLET ORAL
Status: COMPLETED | OUTPATIENT
Start: 2024-12-06 | End: 2024-12-06

## 2024-12-06 RX ORDER — IBUPROFEN 200 MG
16 TABLET ORAL
Status: DISCONTINUED | OUTPATIENT
Start: 2024-12-06 | End: 2024-12-07 | Stop reason: HOSPADM

## 2024-12-06 RX ORDER — IBUPROFEN 200 MG
24 TABLET ORAL
Status: DISCONTINUED | OUTPATIENT
Start: 2024-12-06 | End: 2024-12-07 | Stop reason: HOSPADM

## 2024-12-06 RX ORDER — NALOXONE HCL 0.4 MG/ML
0.02 VIAL (ML) INJECTION
Status: DISCONTINUED | OUTPATIENT
Start: 2024-12-06 | End: 2024-12-07 | Stop reason: HOSPADM

## 2024-12-06 RX ORDER — GLUCAGON 1 MG
1 KIT INJECTION
Status: DISCONTINUED | OUTPATIENT
Start: 2024-12-06 | End: 2024-12-07 | Stop reason: HOSPADM

## 2024-12-06 RX ORDER — ONDANSETRON HYDROCHLORIDE 2 MG/ML
4 INJECTION, SOLUTION INTRAVENOUS EVERY 8 HOURS PRN
Status: DISCONTINUED | OUTPATIENT
Start: 2024-12-06 | End: 2024-12-07 | Stop reason: HOSPADM

## 2024-12-06 RX ORDER — LOSARTAN POTASSIUM 50 MG/1
100 TABLET ORAL DAILY
Status: DISCONTINUED | OUTPATIENT
Start: 2024-12-07 | End: 2024-12-07

## 2024-12-06 RX ORDER — ACETAMINOPHEN 325 MG/1
650 TABLET ORAL EVERY 4 HOURS PRN
Status: DISCONTINUED | OUTPATIENT
Start: 2024-12-06 | End: 2024-12-07 | Stop reason: HOSPADM

## 2024-12-06 RX ORDER — METOPROLOL SUCCINATE 25 MG/1
25 TABLET, EXTENDED RELEASE ORAL DAILY
Status: DISCONTINUED | OUTPATIENT
Start: 2024-12-07 | End: 2024-12-07

## 2024-12-06 RX ORDER — ASPIRIN 325 MG
325 TABLET, DELAYED RELEASE (ENTERIC COATED) ORAL
Status: COMPLETED | OUTPATIENT
Start: 2024-12-06 | End: 2024-12-06

## 2024-12-06 RX ORDER — IBUPROFEN 200 MG
200-800 TABLET ORAL EVERY 6 HOURS PRN
COMMUNITY

## 2024-12-06 RX ORDER — NAPROXEN SODIUM 220 MG/1
81 TABLET, FILM COATED ORAL DAILY
Status: DISCONTINUED | OUTPATIENT
Start: 2024-12-07 | End: 2024-12-07 | Stop reason: HOSPADM

## 2024-12-06 RX ORDER — ATORVASTATIN CALCIUM 40 MG/1
40 TABLET, FILM COATED ORAL NIGHTLY
Status: DISCONTINUED | OUTPATIENT
Start: 2024-12-06 | End: 2024-12-07 | Stop reason: HOSPADM

## 2024-12-06 RX ORDER — CLOPIDOGREL BISULFATE 75 MG/1
75 TABLET ORAL DAILY
Status: DISCONTINUED | OUTPATIENT
Start: 2024-12-07 | End: 2024-12-07 | Stop reason: HOSPADM

## 2024-12-06 RX ORDER — ESCITALOPRAM OXALATE 5 MG/1
5 TABLET ORAL DAILY
Status: DISCONTINUED | OUTPATIENT
Start: 2024-12-07 | End: 2024-12-07 | Stop reason: HOSPADM

## 2024-12-06 RX ORDER — SPIRONOLACTONE 25 MG/1
50 TABLET ORAL DAILY
Status: DISCONTINUED | OUTPATIENT
Start: 2024-12-07 | End: 2024-12-07 | Stop reason: HOSPADM

## 2024-12-06 RX ORDER — ALLOPURINOL 100 MG/1
300 TABLET ORAL DAILY
Status: DISCONTINUED | OUTPATIENT
Start: 2024-12-07 | End: 2024-12-07 | Stop reason: HOSPADM

## 2024-12-06 RX ORDER — POLYETHYLENE GLYCOL 3350 17 G/17G
17 POWDER, FOR SOLUTION ORAL DAILY PRN
Status: DISCONTINUED | OUTPATIENT
Start: 2024-12-06 | End: 2024-12-07 | Stop reason: HOSPADM

## 2024-12-06 RX ORDER — SODIUM CHLORIDE 0.9 % (FLUSH) 0.9 %
5 SYRINGE (ML) INJECTION
Status: DISCONTINUED | OUTPATIENT
Start: 2024-12-06 | End: 2024-12-07 | Stop reason: HOSPADM

## 2024-12-06 RX ADMIN — SENNOSIDES AND DOCUSATE SODIUM 1 TABLET: 50; 8.6 TABLET ORAL at 09:12

## 2024-12-06 RX ADMIN — Medication 6 MG: at 10:12

## 2024-12-06 RX ADMIN — HYDROCODONE BITARTRATE AND ACETAMINOPHEN 1 TABLET: 5; 325 TABLET ORAL at 06:12

## 2024-12-06 RX ADMIN — ATORVASTATIN CALCIUM 40 MG: 40 TABLET, FILM COATED ORAL at 09:12

## 2024-12-06 RX ADMIN — ASPIRIN 325 MG: 325 TABLET, COATED ORAL at 06:12

## 2024-12-06 RX ADMIN — HEPARIN SODIUM 5000 UNITS: 5000 INJECTION INTRAVENOUS; SUBCUTANEOUS at 09:12

## 2024-12-06 RX ADMIN — ACETAMINOPHEN 650 MG: 325 TABLET ORAL at 10:12

## 2024-12-06 NOTE — ED PROVIDER NOTES
Encounter Date: 12/6/2024       History     Chief Complaint   Patient presents with    Chest Pain     Patient reports to the ED complaining of chest pain described as pressure that started last night. Patient endorses nausea, denies emesis. Ambulatory, NAD noted.     José Miguel Meng is a 49 y.o. female who  has a past medical history of Hypertension.    The patient presents to the ED due to chest pain.  Patient says she has been having chest pain which started last night while she was studying for an exam.  She reports no radiation of her pain.  She tried taking nitroglycerin without relief.  She reports being under lot of stress.  She was recently admitted to the hospital last month and treated for NSTEMI and found to have takotsubo cardiomyopathy.  She reports today similar symptoms she denies any sweating vomiting but does report exertional chest pain.    The history is provided by the patient.     Review of patient's allergies indicates:  No Known Allergies  Past Medical History:   Diagnosis Date    Hypertension      Past Surgical History:   Procedure Laterality Date    BREAST SURGERY  02/15/2019    Reduction    HYSTERECTOMY      LEFT HEART CATHETERIZATION Left 11/11/2024    Procedure: Left heart cath;  Surgeon: Jorden Fong MD;  Location: [facility] CATH LAB/EP;  Service: Cardiology;  Laterality: Left;    REDUCTION OF BOTH BREASTS Bilateral 02/15/2019    Procedure: MAMMOPLASTY, REDUCTION, BILATERAL;  Surgeon: Rajinder Aldrich MD;  Location: [facility] OR [address] [place] [address];  Service: Plastics;  Laterality: Bilateral;    TOTAL REDUCTION MAMMOPLASTY  02/14/2019    TUBAL LIGATION      TUBAL LIGATION      VENTRICULOGRAM, LEFT  11/11/2024    Procedure: Ventriculogram, Left;  Surgeon: Jorden Fong MD;  Location: [facility] CATH LAB/EP;  Service: Cardiology;;     Family History   Problem Relation Name Age of Onset    Hypertension Mother Myah Meng    Hypertension Father Keegan Meng    Stroke Father Keegan      Heart disease Sister  45    Vision loss Maternal Grandmother Kirsten Stephenson      Social History     Tobacco Use    Smoking status: Never     Passive exposure: Current    Smokeless tobacco: Never   Substance Use Topics    Alcohol use: Yes     Alcohol/week: 5.0 standard drinks of alcohol     Types: 5 Glasses of wine per week     Comment: socially    Drug use: No     Review of Systems   Constitutional:  Negative for fever.   HENT:  Negative for sore throat.    Respiratory:  Positive for shortness of breath.    Cardiovascular:  Positive for chest pain. Negative for leg swelling.   Gastrointestinal:  Negative for nausea.   Genitourinary:  Negative for dysuria.   Musculoskeletal:  Negative for back pain.   Skin:  Negative for rash.   Neurological:  Negative for weakness.   Hematological:  Does not bruise/bleed easily.       Physical Exam     Initial Vitals [12/06/24 1719]   BP Pulse Resp Temp SpO2   130/78 78 18 97.8 °F (36.6 °C) 100 %      MAP       --         Physical Exam    Constitutional:  Non-toxic appearance. No distress.   HENT:   Head: Normocephalic and atraumatic.   Cardiovascular:  Regular rhythm, S1 normal and S2 normal.           No murmur heard.  Pulmonary/Chest: Breath sounds normal. No respiratory distress.   Abdominal: Abdomen is soft. She exhibits no distension. There is no abdominal tenderness.     Neurological: She is alert.   Skin: Skin is warm. No rash noted.   Psychiatric: She has a normal mood and affect.         ED Course   Procedures  Labs Reviewed   CBC W/ AUTO DIFFERENTIAL - Abnormal       Result Value    WBC 6.55      RBC 2.81 (*)     Hemoglobin 9.1 (*)     Hematocrit 26.7 (*)     MCV 95      MCH 32.4 (*)     MCHC 34.1      RDW 13.1      Platelets 208      MPV 10.5      Immature Granulocytes 0.5      Gran # (ANC) 4.0      Immature Grans (Abs) 0.03      Lymph # 2.1      Mono # 0.4      Eos # 0.0      Baso # 0.02      nRBC 0      Gran % 61.3      Lymph % 31.8      Mono % 5.8      Eosinophil % 0.3       Basophil % 0.3      Differential Method Automated     COMPREHENSIVE METABOLIC PANEL - Abnormal    Sodium 133 (*)     Potassium 4.4      Chloride 106      CO2 19 (*)     Glucose 97      BUN 11      Creatinine 1.1      Calcium 9.0      Total Protein 6.9      Albumin 3.7      Total Bilirubin 0.6      Alkaline Phosphatase 54      AST 30      ALT 14      eGFR >60      Anion Gap 8     TROPONIN I    Troponin I <0.006     B-TYPE NATRIURETIC PEPTIDE    BNP 14            Imaging Results              X-Ray Chest AP Portable (Final result)  Result time 12/06/24 19:19:36      Final result by Hans Ramsey MD (12/06/24 19:19:36)                   Impression:      No acute abnormality.      Electronically signed by: Hans Ramsey  Date:    12/06/2024  Time:    19:19               Narrative:    EXAMINATION:  XR CHEST AP PORTABLE    CLINICAL HISTORY:  Chest Pain;    TECHNIQUE:  Single frontal view of the chest was performed.    COMPARISON:  11/09/2024    FINDINGS:  The lungs are clear, with normal appearance of pulmonary vasculature and no pleural effusion or pneumothorax.    The cardiac silhouette is normal in size. The hilar and mediastinal contours are unremarkable.    Bones are intact.                                       Medications   aspirin EC tablet 325 mg (325 mg Oral Given 12/6/24 1851)   HYDROcodone-acetaminophen 5-325 mg per tablet 1 tablet (1 tablet Oral Given 12/6/24 1851)     Medical Decision Making  Differential Diagnosis includes, but is not limited to:  ACS/MI, PE, aortic dissection, pneumothorax, cardiac tamponade, pericarditis/myocarditis, pneumonia, infection/abscess, lung mass, trauma/fracture, costochondritis/pleurisy, MSK pain/contusion, GERD, biliary disease, pancreatitis, anemia      Amount and/or Complexity of Data Reviewed  External Data Reviewed: notes.     Details: Patient underwent left heart catheterization on 11/11/2024 after troponins were elevated for NSTEMI.  No significant coronary  artery disease was noted.  Patient had decreased EF to 40-45% and structural changes consistent with takotsubo cardiomyopathy.  Heparin drip was discontinued and she was advised to be on dual antiplatelet therapy a metoprolol.  Labs: ordered.  Radiology: ordered.  Discussion of management or test interpretation with external provider(s): Spoke with Dr. Castañeda, regarding the patient's history of present illness.  Will plan to admit for troponin trending, cardiac monitoring, echocardiogram in the morning.  Cardiology will evaluate her during her admission    Risk  OTC drugs.  Prescription drug management.  Risk Details: Patient's initial lab work is unremarkable including her troponin.  Patient given her previous ultrasound findings of takotsubo cardiomyopathy he will be admitted for further evaluation and treatment.                   ED Course as of 12/06/24 2030   Fri Dec 06, 2024   1721 EKG: Rate 79.  Normal sinus rhythm.  Diffuse T-wave inversions.  No STEMI.  T-wave inversions present on EKG 11/11/2024 [RN]      ED Course User Index  [RN] Ethan Douglass Jr., MD                           Clinical Impression:  Final diagnoses:  [R07.9] Chest pain          ED Disposition Condition    Observation                Portions of this note were dictated using voice recognition software and may contain dictation related errors in spelling/grammar/syntax not found on text review       Ethan Douglass Jr., MD  12/06/24 2030

## 2024-12-07 VITALS
WEIGHT: 169 LBS | RESPIRATION RATE: 18 BRPM | HEIGHT: 68 IN | TEMPERATURE: 98 F | SYSTOLIC BLOOD PRESSURE: 119 MMHG | HEART RATE: 73 BPM | OXYGEN SATURATION: 100 % | BODY MASS INDEX: 25.61 KG/M2 | DIASTOLIC BLOOD PRESSURE: 74 MMHG

## 2024-12-07 LAB
ALBUMIN SERPL BCP-MCNC: 3.6 G/DL (ref 3.5–5.2)
ALP SERPL-CCNC: 59 U/L (ref 40–150)
ALT SERPL W/O P-5'-P-CCNC: 13 U/L (ref 10–44)
ANION GAP SERPL CALC-SCNC: 9 MMOL/L (ref 8–16)
APICAL FOUR CHAMBER EJECTION FRACTION: 57 %
APICAL TWO CHAMBER EJECTION FRACTION: 43 %
ASCENDING AORTA: 3.19 CM
AST SERPL-CCNC: 32 U/L (ref 10–40)
AV INDEX (PROSTH): 0.81
AV MEAN GRADIENT: 4 MMHG
AV PEAK GRADIENT: 7.8 MMHG
AV VALVE AREA BY VELOCITY RATIO: 3.3 CM²
AV VALVE AREA: 3.4 CM²
AV VELOCITY RATIO: 0.79
BASOPHILS # BLD AUTO: 0.01 K/UL (ref 0–0.2)
BASOPHILS NFR BLD: 0.2 % (ref 0–1.9)
BILIRUB SERPL-MCNC: 0.6 MG/DL (ref 0.1–1)
BSA FOR ECHO PROCEDURE: 1.92 M2
BUN SERPL-MCNC: 11 MG/DL (ref 6–20)
CALCIUM SERPL-MCNC: 9 MG/DL (ref 8.7–10.5)
CHLORIDE SERPL-SCNC: 105 MMOL/L (ref 95–110)
CO2 SERPL-SCNC: 18 MMOL/L (ref 23–29)
CREAT SERPL-MCNC: 1.1 MG/DL (ref 0.5–1.4)
CRP SERPL-MCNC: 0.8 MG/L (ref 0–8.2)
CV ECHO LV RWT: 0.42 CM
DIFFERENTIAL METHOD BLD: ABNORMAL
DOP CALC AO PEAK VEL: 1.4 M/S
DOP CALC AO VTI: 23.9 CM
DOP CALC LVOT AREA: 4.2 CM2
DOP CALC LVOT DIAMETER: 2.3 CM
DOP CALC LVOT PEAK VEL: 1.1 M/S
DOP CALC LVOT STROKE VOLUME: 80.1 CM3
DOP CALCLVOT PEAK VEL VTI: 19.3 CM
E WAVE DECELERATION TIME: 207.89 MSEC
E/A RATIO: 1.12
E/E' RATIO: 10.43 M/S
ECHO LV POSTERIOR WALL: 1.1 CM (ref 0.6–1.1)
EOSINOPHIL # BLD AUTO: 0.1 K/UL (ref 0–0.5)
EOSINOPHIL NFR BLD: 1.2 % (ref 0–8)
ERYTHROCYTE [DISTWIDTH] IN BLOOD BY AUTOMATED COUNT: 13 % (ref 11.5–14.5)
ERYTHROCYTE [SEDIMENTATION RATE] IN BLOOD BY PHOTOMETRIC METHOD: 33 MM/HR (ref 0–36)
EST. GFR  (NO RACE VARIABLE): >60 ML/MIN/1.73 M^2
FRACTIONAL SHORTENING: 35.8 % (ref 28–44)
GLUCOSE SERPL-MCNC: 94 MG/DL (ref 70–110)
HCT VFR BLD AUTO: 27.7 % (ref 37–48.5)
HGB BLD-MCNC: 9.4 G/DL (ref 12–16)
IMM GRANULOCYTES # BLD AUTO: 0.01 K/UL (ref 0–0.04)
IMM GRANULOCYTES NFR BLD AUTO: 0.2 % (ref 0–0.5)
INTERVENTRICULAR SEPTUM: 1.1 CM (ref 0.6–1.1)
LEFT ATRIUM AREA SYSTOLIC (APICAL 2 CHAMBER): 14.51 CM2
LEFT ATRIUM AREA SYSTOLIC (APICAL 4 CHAMBER): 13.46 CM2
LEFT ATRIUM VOLUME INDEX MOD: 15.8 ML/M2
LEFT ATRIUM VOLUME MOD: 30.09 ML
LEFT INTERNAL DIMENSION IN SYSTOLE: 3.4 CM (ref 2.1–4)
LEFT VENTRICLE DIASTOLIC VOLUME INDEX: 70.8 ML/M2
LEFT VENTRICLE DIASTOLIC VOLUME: 134.52 ML
LEFT VENTRICLE END DIASTOLIC VOLUME APICAL 2 CHAMBER: 48.52 ML
LEFT VENTRICLE END DIASTOLIC VOLUME APICAL 4 CHAMBER: 60.43 ML
LEFT VENTRICLE END SYSTOLIC VOLUME APICAL 2 CHAMBER: 33.57 ML
LEFT VENTRICLE END SYSTOLIC VOLUME APICAL 4 CHAMBER: 28.11 ML
LEFT VENTRICLE MASS INDEX: 119.9 G/M2
LEFT VENTRICLE SYSTOLIC VOLUME INDEX: 24.6 ML/M2
LEFT VENTRICLE SYSTOLIC VOLUME: 46.65 ML
LEFT VENTRICULAR INTERNAL DIMENSION IN DIASTOLE: 5.3 CM (ref 3.5–6)
LEFT VENTRICULAR MASS: 227.7 G
LV LATERAL E/E' RATIO: 10.43 M/S
LV SEPTAL E/E' RATIO: 10.43 M/S
LVED V (TEICH): 134.52 ML
LVES V (TEICH): 46.65 ML
LVOT MG: 2.15 MMHG
LVOT MV: 0.68 CM/S
LYMPHOCYTES # BLD AUTO: 2.1 K/UL (ref 1–4.8)
LYMPHOCYTES NFR BLD: 41.1 % (ref 18–48)
MAGNESIUM SERPL-MCNC: 1.5 MG/DL (ref 1.6–2.6)
MCH RBC QN AUTO: 32.2 PG (ref 27–31)
MCHC RBC AUTO-ENTMCNC: 33.9 G/DL (ref 32–36)
MCV RBC AUTO: 95 FL (ref 82–98)
MONOCYTES # BLD AUTO: 0.3 K/UL (ref 0.3–1)
MONOCYTES NFR BLD: 5.4 % (ref 4–15)
MV PEAK A VEL: 0.65 M/S
MV PEAK E VEL: 0.73 M/S
MV STENOSIS PRESSURE HALF TIME: 60.29 MS
MV VALVE AREA P 1/2 METHOD: 3.65 CM2
NEUTROPHILS # BLD AUTO: 2.6 K/UL (ref 1.8–7.7)
NEUTROPHILS NFR BLD: 51.9 % (ref 38–73)
NRBC BLD-RTO: 0 /100 WBC
OHS LV EJECTION FRACTION SIMPSONS BIPLANE MOD: 49 %
PHOSPHATE SERPL-MCNC: 3 MG/DL (ref 2.7–4.5)
PISA MRMAX VEL: 5.13 M/S
PISA TR MAX VEL: 2.39 M/S
PLATELET # BLD AUTO: 228 K/UL (ref 150–450)
PMV BLD AUTO: 10.6 FL (ref 9.2–12.9)
POTASSIUM SERPL-SCNC: 3.5 MMOL/L (ref 3.5–5.1)
PROT SERPL-MCNC: 6.8 G/DL (ref 6–8.4)
PV MV: 0.63 M/S
PV PEAK GRADIENT: 3 MMHG
PV PEAK VELOCITY: 0.83 M/S
RA PRESSURE ESTIMATED: 3 MMHG
RA VOL SYS: 27.04 ML
RBC # BLD AUTO: 2.92 M/UL (ref 4–5.4)
RIGHT ATRIAL AREA: 11.9 CM2
RIGHT ATRIUM VOLUME AREA LENGTH APICAL 4 CHAMBER: 25.82 ML
RV TB RVSP: 5 MMHG
RV TISSUE DOPPLER FREE WALL SYSTOLIC VELOCITY 1 (APICAL 4 CHAMBER VIEW): 10.37 CM/S
SARS-COV-2 RDRP RESP QL NAA+PROBE: NEGATIVE
SINUS: 3.12 CM
SODIUM SERPL-SCNC: 132 MMOL/L (ref 136–145)
STJ: 3.16 CM
TDI LATERAL: 0.07 M/S
TDI SEPTAL: 0.07 M/S
TDI: 0.07 M/S
TR MAX PG: 23 MMHG
TRICUSPID ANNULAR PLANE SYSTOLIC EXCURSION: 2.02 CM
TROPONIN I SERPL DL<=0.01 NG/ML-MCNC: 0.01 NG/ML (ref 0–0.03)
TV REST PULMONARY ARTERY PRESSURE: 26 MMHG
WBC # BLD AUTO: 5.01 K/UL (ref 3.9–12.7)
Z-SCORE OF LEFT VENTRICULAR DIMENSION IN END DIASTOLE: -0.06
Z-SCORE OF LEFT VENTRICULAR DIMENSION IN END SYSTOLE: 0.27

## 2024-12-07 PROCEDURE — 86140 C-REACTIVE PROTEIN: CPT | Performed by: INTERNAL MEDICINE

## 2024-12-07 PROCEDURE — 99214 OFFICE O/P EST MOD 30 MIN: CPT | Mod: 25,,, | Performed by: INTERNAL MEDICINE

## 2024-12-07 PROCEDURE — 84484 ASSAY OF TROPONIN QUANT: CPT

## 2024-12-07 PROCEDURE — 96372 THER/PROPH/DIAG INJ SC/IM: CPT

## 2024-12-07 PROCEDURE — 84100 ASSAY OF PHOSPHORUS: CPT

## 2024-12-07 PROCEDURE — 93005 ELECTROCARDIOGRAM TRACING: CPT

## 2024-12-07 PROCEDURE — 63600175 PHARM REV CODE 636 W HCPCS

## 2024-12-07 PROCEDURE — 96366 THER/PROPH/DIAG IV INF ADDON: CPT

## 2024-12-07 PROCEDURE — 36415 COLL VENOUS BLD VENIPUNCTURE: CPT

## 2024-12-07 PROCEDURE — 36415 COLL VENOUS BLD VENIPUNCTURE: CPT | Performed by: INTERNAL MEDICINE

## 2024-12-07 PROCEDURE — 80053 COMPREHEN METABOLIC PANEL: CPT

## 2024-12-07 PROCEDURE — 93010 ELECTROCARDIOGRAM REPORT: CPT | Mod: ,,, | Performed by: INTERNAL MEDICINE

## 2024-12-07 PROCEDURE — 83735 ASSAY OF MAGNESIUM: CPT

## 2024-12-07 PROCEDURE — 85025 COMPLETE CBC W/AUTO DIFF WBC: CPT

## 2024-12-07 PROCEDURE — 87635 SARS-COV-2 COVID-19 AMP PRB: CPT | Performed by: INTERNAL MEDICINE

## 2024-12-07 PROCEDURE — 96365 THER/PROPH/DIAG IV INF INIT: CPT

## 2024-12-07 PROCEDURE — 99900035 HC TECH TIME PER 15 MIN (STAT)

## 2024-12-07 PROCEDURE — 25000003 PHARM REV CODE 250

## 2024-12-07 PROCEDURE — 85652 RBC SED RATE AUTOMATED: CPT | Performed by: INTERNAL MEDICINE

## 2024-12-07 PROCEDURE — G0378 HOSPITAL OBSERVATION PER HR: HCPCS

## 2024-12-07 RX ORDER — METOPROLOL SUCCINATE 25 MG/1
25 TABLET, EXTENDED RELEASE ORAL 2 TIMES DAILY
Qty: 180 TABLET | Refills: 3 | Status: SHIPPED | OUTPATIENT
Start: 2024-12-07 | End: 2025-12-07

## 2024-12-07 RX ORDER — LOSARTAN POTASSIUM 50 MG/1
50 TABLET ORAL DAILY
Status: DISCONTINUED | OUTPATIENT
Start: 2024-12-08 | End: 2024-12-07 | Stop reason: HOSPADM

## 2024-12-07 RX ORDER — METOPROLOL SUCCINATE 25 MG/1
25 TABLET, EXTENDED RELEASE ORAL 2 TIMES DAILY
Status: DISCONTINUED | OUTPATIENT
Start: 2024-12-07 | End: 2024-12-07 | Stop reason: HOSPADM

## 2024-12-07 RX ORDER — LOSARTAN POTASSIUM 50 MG/1
50 TABLET ORAL DAILY
Qty: 90 TABLET | Refills: 3 | Status: SHIPPED | OUTPATIENT
Start: 2024-12-08 | End: 2025-12-08

## 2024-12-07 RX ORDER — POTASSIUM CHLORIDE 20 MEQ/1
40 TABLET, EXTENDED RELEASE ORAL ONCE
Status: COMPLETED | OUTPATIENT
Start: 2024-12-07 | End: 2024-12-07

## 2024-12-07 RX ORDER — MAGNESIUM SULFATE HEPTAHYDRATE 40 MG/ML
4 INJECTION, SOLUTION INTRAVENOUS ONCE
Status: COMPLETED | OUTPATIENT
Start: 2024-12-07 | End: 2024-12-07

## 2024-12-07 RX ADMIN — SPIRONOLACTONE 50 MG: 25 TABLET ORAL at 11:12

## 2024-12-07 RX ADMIN — CLOPIDOGREL BISULFATE 75 MG: 75 TABLET ORAL at 11:12

## 2024-12-07 RX ADMIN — MAGNESIUM SULFATE HEPTAHYDRATE 2 G: 40 INJECTION, SOLUTION INTRAVENOUS at 11:12

## 2024-12-07 RX ADMIN — SENNOSIDES AND DOCUSATE SODIUM 1 TABLET: 50; 8.6 TABLET ORAL at 11:12

## 2024-12-07 RX ADMIN — ALLOPURINOL 300 MG: 100 TABLET ORAL at 11:12

## 2024-12-07 RX ADMIN — METOPROLOL SUCCINATE 25 MG: 25 TABLET, EXTENDED RELEASE ORAL at 11:12

## 2024-12-07 RX ADMIN — HEPARIN SODIUM 5000 UNITS: 5000 INJECTION INTRAVENOUS; SUBCUTANEOUS at 05:12

## 2024-12-07 RX ADMIN — ASPIRIN 81 MG CHEWABLE TABLET 81 MG: 81 TABLET CHEWABLE at 11:12

## 2024-12-07 RX ADMIN — POTASSIUM CHLORIDE 40 MEQ: 1500 TABLET, EXTENDED RELEASE ORAL at 11:12

## 2024-12-07 NOTE — PLAN OF CARE
Discharge orders noted. AVS prepared with medication list, importance of medication compliance, follow up appointments, diet, home care instructions, treatment plan, self management, and when to seek medical attention. Detailed clinical reference list attached. AVS printed and handed to patient by bedside nurse. VN reviewed discharge instructions with patient using teachback method.  Allowed time for questions, all questions answered.  Patient verbalized complete understanding of discharge instructions and voices no concerns.      Discharge instructions complete.  No new meds, Transport wheelchair requested.  Bedside nurse notified.

## 2024-12-07 NOTE — PHARMACY MED REC
"    [facility]           Pharmacy  Admission Medication History     The home medication history was taken by Any Camejo.      Medication history obtained from Medications listed below were obtained from: Patient/family.    Based on information gathered for medication list, you may go to "Admission" then "Reconcile Home Medications" tabs to review and/or act upon those items.     The home medication list has been updated by the Pharmacy department.   Please read ALL comments highlighted in yellow.   Please address this information as you see fit.    Feel free to contact us if you have any questions or require assistance.    The medications listed below were removed from the home medication list.  Please reorder if appropriate:    Patient reports NOT TAKING the following medication(s):  Indomethacin 50 mg cap  Colchicine 0.6 mg tab    No current facility-administered medications on file prior to encounter.     Current Outpatient Medications on File Prior to Encounter   Medication Sig Dispense Refill    allopurinoL (ZYLOPRIM) 300 MG tablet TAKE 1 TABLET BY MOUTH EVERY DAY (Patient taking differently: Take 300 mg by mouth once daily.) 90 tablet 0    aspirin 81 MG Chew Take 1 tablet (81 mg total) by mouth once daily. 360 tablet 0    atorvastatin (LIPITOR) 40 MG tablet Take 1 tablet (40 mg total) by mouth every evening. 90 tablet 3    clopidogreL (PLAVIX) 75 mg tablet Take 1 tablet (75 mg total) by mouth once daily. 360 tablet 0    EScitalopram oxalate (LEXAPRO) 5 MG Tab Take 1 tablet (5 mg total) by mouth once daily. 90 tablet 3    ibuprofen (ADVIL,MOTRIN) 200 MG tablet Take 200-800 mg by mouth every 6 (six) hours as needed for Pain.      losartan (COZAAR) 100 MG tablet TAKE 1 TABLET BY MOUTH EVERY DAY (Patient taking differently: Take 100 mg by mouth once daily.) 90 tablet 3    metoprolol succinate (TOPROL-XL) 25 MG 24 hr tablet Take 1 tablet (25 mg total) by mouth once daily. 90 tablet 3    " nitroGLYCERIN (NITROSTAT) 0.4 MG SL tablet Place 1 tablet (0.4 mg total) under the tongue every 5 (five) minutes as needed for Chest pain. 90 tablet 3    pantoprazole (PROTONIX) 40 MG tablet Take 1 tablet (40 mg total) by mouth every morning. 90 tablet 1    spironolactone (ALDACTONE) 50 MG tablet Take 1 tablet (50 mg total) by mouth once daily. 30 tablet 11       Please address this information as you see fit.  Feel free to contact us if you have any questions or require assistance.    Any Camejo  [phone]              .

## 2024-12-07 NOTE — CONSULTS
[facility] - Telemetry  Cardiology  Consult Note    Patient Name: José Miguel Meng  MRN: [id number]  Admission Date: 12/6/2024  Hospital Length of Stay: 0 days  Code Status: Full Code   Attending Provider: Salvador Cota MD   Consulting Provider: Dereck Dempsey MD  Primary Care Physician: Steve Skelton III, MD  Principal Problem:Chest pain    Patient information was obtained from patient, past medical records, and ER records.     Inpatient consult to [facility]  Consult performed by: Dereck Dempsey MD  Consult ordered by: Ethan Douglass Jr., MD        Subjective:     Chief Complaint:  Chest pain    HPI:  49 years old female with recent history of stress-induced cardiomyopathy who presented with chest pain.  Pain reminded her by her presentation few weeks ago.  Troponins negative.  EKG with diffuse T-wave inversion which is stable as compared to prior.  She reports the pain with the exertion and describes some palpitation.  She is under a lot of stress at her school.  Monitor reviewed without significant arrhythmias.  Patient has been compliant with her medications        Past Medical History:   Diagnosis Date    Hypertension        Past Surgical History:   Procedure Laterality Date    BREAST SURGERY  02/15/2019    Reduction    HYSTERECTOMY      LEFT HEART CATHETERIZATION Left 11/11/2024    Procedure: Left heart cath;  Surgeon: Jorden Fong MD;  Location: [facility] CATH LAB/EP;  Service: Cardiology;  Laterality: Left;    REDUCTION OF BOTH BREASTS Bilateral 02/15/2019    Procedure: MAMMOPLASTY, REDUCTION, BILATERAL;  Surgeon: Rajinder Aldrich MD;  Location: [facility] OR [address] [place] [address];  Service: Plastics;  Laterality: Bilateral;    TOTAL REDUCTION MAMMOPLASTY  02/14/2019    TUBAL LIGATION      TUBAL LIGATION      VENTRICULOGRAM, LEFT  11/11/2024    Procedure: Ventriculogram, Left;  Surgeon: Jorden Fong MD;  Location: [facility] CATH LAB/EP;  Service: Cardiology;;       Review of patient's allergies  indicates:  No Known Allergies    No current facility-administered medications on file prior to encounter.     Current Outpatient Medications on File Prior to Encounter   Medication Sig    allopurinoL (ZYLOPRIM) 300 MG tablet TAKE 1 TABLET BY MOUTH EVERY DAY (Patient taking differently: Take 300 mg by mouth once daily.)    aspirin 81 MG Chew Take 1 tablet (81 mg total) by mouth once daily.    atorvastatin (LIPITOR) 40 MG tablet Take 1 tablet (40 mg total) by mouth every evening.    clopidogreL (PLAVIX) 75 mg tablet Take 1 tablet (75 mg total) by mouth once daily.    EScitalopram oxalate (LEXAPRO) 5 MG Tab Take 1 tablet (5 mg total) by mouth once daily.    ibuprofen (ADVIL,MOTRIN) 200 MG tablet Take 200-800 mg by mouth every 6 (six) hours as needed for Pain.    losartan (COZAAR) 100 MG tablet TAKE 1 TABLET BY MOUTH EVERY DAY (Patient taking differently: Take 100 mg by mouth once daily.)    metoprolol succinate (TOPROL-XL) 25 MG 24 hr tablet Take 1 tablet (25 mg total) by mouth once daily.    nitroGLYCERIN (NITROSTAT) 0.4 MG SL tablet Place 1 tablet (0.4 mg total) under the tongue every 5 (five) minutes as needed for Chest pain.    pantoprazole (PROTONIX) 40 MG tablet Take 1 tablet (40 mg total) by mouth every morning.    spironolactone (ALDACTONE) 50 MG tablet Take 1 tablet (50 mg total) by mouth once daily.     Family History       Problem Relation (Age of Onset)    Heart disease Sister (45)    Hypertension Mother, Father    Stroke Father    Vision loss Maternal Grandmother          Tobacco Use    Smoking status: Never     Passive exposure: Current    Smokeless tobacco: Never   Substance and Sexual Activity    Alcohol use: Yes     Alcohol/week: 5.0 standard drinks of alcohol     Types: 5 Glasses of wine per week     Comment: socially    Drug use: No    Sexual activity: Yes     Partners: Male     Birth control/protection: Condom, Partner-Vasectomy     Review of Systems   Constitutional: Negative for chills and fever.    HENT:  Negative for hearing loss and nosebleeds.    Eyes:  Negative for blurred vision.   Cardiovascular:         As in HPI   Respiratory:  Negative for hemoptysis and shortness of breath.    Hematologic/Lymphatic: Negative for bleeding problem.   Skin:  Negative for itching.   Musculoskeletal:  Negative for falls.   Gastrointestinal:  Negative for abdominal pain and hematochezia.   Genitourinary:  Negative for hematuria.   Neurological:  Negative for dizziness and loss of balance.   Psychiatric/Behavioral:  Negative for altered mental status and depression.      Objective:     Vital Signs (Most Recent):  Temp: 98.2 °F (36.8 °C) (12/07/24 0754)  Pulse: 88 (12/07/24 0754)  Resp: 18 (12/07/24 0310)  BP: 108/74 (12/07/24 0754)  SpO2: 100 % (12/07/24 0754) Vital Signs (24h Range):  Temp:  [97.4 °F (36.3 °C)-98.5 °F (36.9 °C)] 98.2 °F (36.8 °C)  Pulse:  [70-88] 88  Resp:  [16-59] 18  SpO2:  [100 %] 100 %  BP: (105-134)/(74-92) 108/74     Weight: 76.9 kg (169 lb 8.5 oz)  Body mass index is 25.78 kg/m².    SpO2: 100 %         Intake/Output Summary (Last 24 hours) at 12/7/2024 1026  Last data filed at 12/7/2024 0638  Gross per 24 hour   Intake 360 ml   Output --   Net 360 ml       Lines/Drains/Airways       Peripheral Intravenous Line  Duration                  Peripheral IV - Single Lumen 12/06/24 20 G Anterior;Right Forearm 1 day                    Physical Exam  Constitutional:       Appearance: She is well-developed.   HENT:      Head: Normocephalic and atraumatic.   Eyes:      Conjunctiva/sclera: Conjunctivae normal.   Neck:      Vascular: No carotid bruit or JVD.   Cardiovascular:      Rate and Rhythm: Normal rate and regular rhythm.      Pulses:           Carotid pulses are 2+ on the right side and 2+ on the left side.       Radial pulses are 2+ on the right side and 2+ on the left side.      Heart sounds: Normal heart sounds. No murmur heard.     No friction rub. No gallop.   Pulmonary:      Effort: Pulmonary  effort is normal. No respiratory distress.      Breath sounds: Normal breath sounds. No stridor. No wheezing.   Musculoskeletal:      Cervical back: Neck supple.   Skin:     General: Skin is warm and dry.   Neurological:      Mental Status: She is alert and oriented to person, place, and time.   Psychiatric:         Behavior: Behavior normal.         Significant Labs: BMP:   Recent Labs   Lab 12/06/24  0957 12/06/24  1845 12/07/24  0147    97 94   * 133* 132*   K 4.7 4.4 3.5    106 105   CO2 21* 19* 18*   BUN 12 11 11   CREATININE 1.1 1.1 1.1   CALCIUM 9.6 9.0 9.0   MG  --   --  1.5*   , CMP   Recent Labs   Lab 12/06/24  0957 12/06/24  1845 12/07/24  0147   * 133* 132*   K 4.7 4.4 3.5    106 105   CO2 21* 19* 18*    97 94   BUN 12 11 11   CREATININE 1.1 1.1 1.1   CALCIUM 9.6 9.0 9.0   PROT 7.6 6.9 6.8   ALBUMIN 3.9 3.7 3.6   BILITOT 0.8 0.6 0.6   ALKPHOS 57 54 59   AST 41* 30 32   ALT 19 14 13   ANIONGAP 9 8 9   , Lipid Panel   Recent Labs   Lab 12/06/24  0957   CHOL 151   HDL 76*   LDLCALC 29.2*   TRIG 229*   CHOLHDL 50.3*   , Troponin   Recent Labs   Lab 12/06/24  1848 12/07/24  0147   TROPONINI <0.006 0.009   , and All pertinent lab results from the last 24 hours have been reviewed.    Significant Imaging: Echocardiogram: 2D echo with color flow doppler: No results found for this or any previous visit. and Transthoracic echo (TTE) complete ([facility] Only):   Results for orders placed or performed during the hospital encounter of 11/09/24   Echo   Result Value Ref Range    BSA 1.89 m2    Machado's Biplane MOD Ejection Fraction 53 %    A2C EF 60 %    A4C EF 41 %    LVOT stroke volume 88.0 cm3    LVIDd 5.2 3.5 - 6.0 cm    LV Systolic Volume 33.66 mL    LV Systolic Volume Index 17.9 mL/m2    LVIDs 3.0 2.1 - 4.0 cm    LV ESV A2C 47.19 mL    LV Diastolic Volume 127.02 mL    LV ESV A4C 35.64 mL    LV Diastolic Volume Index 67.56 mL/m2    LV EDV A2C [id number] mL    LV EDV A4C  45.07 mL    Left Ventricular End Systolic Volume by Teichholz Method 33.66 mL    Left Ventricular End Diastolic Volume by Teichholz Method 127.02 mL    IVS 1.4 (A) 0.6 - 1.1 cm    LVOT diameter 2.3 cm    LVOT area 4.2 cm2    FS 42.3 28 - 44 %    Left Ventricle Relative Wall Thickness 0.50 cm    PW 1.3 (A) 0.6 - 1.1 cm    LV mass 293.8 g    LV Mass Index 156.3 g/m2    MV Peak E Dago 0.53 m/s    TDI LATERAL 0.05 m/s    TDI SEPTAL 0.04 m/s    E/E' ratio 11.78 m/s    MV Peak A Dago 0.82 m/s    TR Max Dago 2.22 m/s    E/A ratio 0.65     E wave deceleration time 150.77 msec    LV SEPTAL E/E' RATIO 13.25 m/s    LV LATERAL E/E' RATIO 10.60 m/s    PV Peak S Dago 0.32 m/s    PV Peak D Dago 0.28 m/s    Pulm vein S/D ratio 1.14     LVOT peak dago 1.1 m/s    Left Ventricular Outflow Tract Mean Velocity 0.65 cm/s    Left Ventricular Outflow Tract Mean Gradient 2.14 mmHg    RV- may basal diam 3.3 cm    RV S' 10.51 cm/s    TAPSE 1.63 cm    RV/LV Ratio 0.63 cm    LA Vol (MOD) 43.59 mL    TRENT (MOD) 23.2 mL/m2    RA area length vol 29.47 mL    RA Area 12.5 cm2    RA Vol 30.90 mL    AV mean gradient 2.6 mmHg    AV peak gradient 4.8 mmHg    Ao peak dago 1.1 m/s    Ao VTI 21.3 cm    LVOT peak VTI 21.2 cm    AV valve area 4.1 cm²    AV Velocity Ratio 1.00     AV index (prosthetic) 1.00     KAITY by Velocity Ratio 4.2 cm²    Mr max dago 5.58 m/s    MV mean gradient 1 mmHg    MV peak gradient 3 mmHg    MV stenosis pressure 1/2 time 43.72 ms    MV valve area p 1/2 method 5.03 cm2    MV valve area by continuity eq 3.22 cm2    MV VTI 27.3 cm    Triscuspid Valve Regurgitation Peak Gradient 20 mmHg    PV PEAK VELOCITY 0.72 m/s    PV peak gradient 2 mmHg    Pulmonary Valve Mean Velocity 0.54 m/s    Sinus 3.30 cm    STJ 3.04 cm    Ascending aorta 3.65 cm    Mean e' 0.05 m/s    ZLVIDS -0.60     ZLVIDD -0.10     LA area A4C 15.36 cm2    LA area A2C 16.55 cm2    TV resting pulmonary artery pressure 23 mmHg    RV TB RVSP 5 mmHg    Est. RA pres 3 mmHg     Narrative      Left Ventricle: The left ventricle is normal in size. There is mild   concentric hypertrophy. Regional wall motion abnormalities present. See   diagram for wall motion findings. There is mildly reduced systolic   function with a visually estimated ejection fraction of 45 - 50%. Biplane   (2D) method of discs ejection fraction is 53%. Grade I diastolic   dysfunction.    Right Ventricle: Normal right ventricular cavity size. Systolic   function is normal.    Aortic Valve: The aortic valve is a trileaflet valve. There is no   stenosis. Aortic valve peak velocity is 1.1 m/s. Mean gradient is 2.6   mmHg. There is no significant regurgitation.    Mitral Valve: The mitral valve is structurally normal. There is mild to   moderate regurgitation.    Aorta: Aortic root is normal in size measuring 3.30 cm.    Pulmonary Artery: The estimated pulmonary artery systolic pressure is   23 mmHg.    IVC/SVC: Normal venous pressure at 3 mmHg.       Assessment and Plan:   49 years old female with    1. Chest pain  2. Recent history of stress-induced cardiomyopathy  3. Hypertension    Plan  Angiogram reviewed.  No significant coronary artery disease.  ROLA flow with a about ROLA 2.  Likely some element of microvascular disease as well.  So far troponins negative.    We will recommend medical therapy for time being  I will increase Toprol to 25 mg b.i.d..  Decrease losartan to 50 mg to give room for up titration of her beta-blocker that will help with her angina/microvascular disease.  We will consider Ranexa as an outpatient if continues to have significant symptoms    Repeat echocardiogram.  If no major changes in the echocardiogram then patient can be discharged and follow up with Dr. 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 I will follow-up with patient. Please contact us if you have any additional questions.    Dereck Dempsey MD  Cardiology   [facility] - Telemetry

## 2024-12-07 NOTE — ASSESSMENT & PLAN NOTE
Patient presented with one day of chest pain radiating to the back  History of NSTEMI, had a LHC which showed takotsubo cardiomyopathy  Initial troponin negative. EKG showed ST depressions on inferior leads which was no change from previous EKG  Likely has chest pain 2/2 takotsubo    Plan:  Monitor on tele  Repeat ECHO  Consult cardiology

## 2024-12-07 NOTE — NURSING
Flu and covid swabs sent to lab. Pt tolerated well. Pt informed of NPO status after MN. Pt AAOx4, voice complete understanding, stated she will comply. Assigned PCTTriston also informed.

## 2024-12-07 NOTE — H&P
Family Medicine  History & Physical    Patient Name: José Miguel Meng  MRN: [id number]  Patient Class: OP- Observation  Admission Date: 12/6/2024  Attending Physician: lEaine Mane MD   Primary Care Provider: Steve Skelton III, MD         Patient information was obtained from patient and ER records.     Subjective:     Principal Problem:Chest pain    Chief Complaint:   Chief Complaint   Patient presents with    Chest Pain     Patient reports to the ED complaining of chest pain described as pressure that started last night. Patient endorses nausea, denies emesis. Ambulatory, NAD noted.        HPI: Patient is a 49 y.o.-year-old female w/ PMHx hypertension, HFpEF, and recently diagnosed NSTEMI/takotsubo cardiomyopathy who presents to the ED with reports of chest pain/pressure associated with radiation to the shoulder. Patient states that the chest pain started yesterday and today it worsened with the radiation to the shoulder. She took nitroglycerin at home without relief of the pain which prompted her to come to th ED. She states that the chest pain felt similar to when she came in and was diagnosed with takotsubo. She denies any associated shortness of breath, no recent fevers, chills, diarrhea, or constipation, no sick contacts. She endorses that the chest pain and fatigue was worse with physical exertion.      In the ED, initial vitals Temp 97.8F, /78, HR 78, RR 18, SpO2 100% on room air. CBC significant for H/H of 9.1/26.7, CMP was WNL. And Estimated Creatinine Clearance: 67.5 mL/min (based on SCr of 1.1 mg/dL). BNP negative, initial troponin negative.   CXR showed no acute abnormalities, EKG showed normal sinus rhythm, and t wave depressions on inferior leads similar to previous EKGs from last month  In the ED patient was treated with  and norco. [facility] admitted patient for ACS rule out    Past Medical History:   Diagnosis Date    Hypertension        Past Surgical History:    Procedure Laterality Date    BREAST SURGERY  02/15/2019    Reduction    HYSTERECTOMY      LEFT HEART CATHETERIZATION Left 11/11/2024    Procedure: Left heart cath;  Surgeon: Jorden Fong MD;  Location: [facility] CATH LAB/EP;  Service: Cardiology;  Laterality: Left;    REDUCTION OF BOTH BREASTS Bilateral 02/15/2019    Procedure: MAMMOPLASTY, REDUCTION, BILATERAL;  Surgeon: Rajinder Aldrich MD;  Location: [address];  Service: Plastics;  Laterality: Bilateral;    TOTAL REDUCTION MAMMOPLASTY  02/14/2019    TUBAL LIGATION      TUBAL LIGATION      VENTRICULOGRAM, LEFT  11/11/2024    Procedure: Ventriculogram, Left;  Surgeon: Jorden Fong MD;  Location: [facility] CATH LAB/EP;  Service: Cardiology;;       Review of patient's allergies indicates:  No Known Allergies    No current facility-administered medications on file prior to encounter.     Current Outpatient Medications on File Prior to Encounter   Medication Sig    allopurinoL (ZYLOPRIM) 300 MG tablet TAKE 1 TABLET BY MOUTH EVERY DAY (Patient taking differently: Take 300 mg by mouth once daily.)    aspirin 81 MG Chew Take 1 tablet (81 mg total) by mouth once daily.    atorvastatin (LIPITOR) 40 MG tablet Take 1 tablet (40 mg total) by mouth every evening.    clopidogreL (PLAVIX) 75 mg tablet Take 1 tablet (75 mg total) by mouth once daily.    EScitalopram oxalate (LEXAPRO) 5 MG Tab Take 1 tablet (5 mg total) by mouth once daily.    ibuprofen (ADVIL,MOTRIN) 200 MG tablet Take 200-800 mg by mouth every 6 (six) hours as needed for Pain.    losartan (COZAAR) 100 MG tablet TAKE 1 TABLET BY MOUTH EVERY DAY (Patient taking differently: Take 100 mg by mouth once daily.)    metoprolol succinate (TOPROL-XL) 25 MG 24 hr tablet Take 1 tablet (25 mg total) by mouth once daily.    nitroGLYCERIN (NITROSTAT) 0.4 MG SL tablet Place 1 tablet (0.4 mg total) under the tongue every 5 (five) minutes as needed for Chest pain.    pantoprazole (PROTONIX) 40 MG tablet Take 1 tablet (40 mg  total) by mouth every morning.    spironolactone (ALDACTONE) 50 MG tablet Take 1 tablet (50 mg total) by mouth once daily.     Family History       Problem Relation (Age of Onset)    Heart disease Sister (45)    Hypertension Mother, Father    Stroke Father    Vision loss Maternal Grandmother          Tobacco Use    Smoking status: Never     Passive exposure: Current    Smokeless tobacco: Never   Substance and Sexual Activity    Alcohol use: Yes     Alcohol/week: 5.0 standard drinks of alcohol     Types: 5 Glasses of wine per week     Comment: socially    Drug use: No    Sexual activity: Yes     Partners: Male     Birth control/protection: Condom, Partner-Vasectomy     Review of Systems   Constitutional:  Negative for chills, fatigue and fever.   Respiratory:  Positive for shortness of breath. Negative for cough, choking, chest tightness and wheezing.    Cardiovascular:  Positive for chest pain. Negative for palpitations and leg swelling.   Gastrointestinal:  Negative for diarrhea, nausea and vomiting.   Genitourinary:  Negative for dysuria.   Musculoskeletal:  Positive for back pain.   Skin:  Negative for pallor, rash and wound.     Objective:     Vital Signs (Most Recent):  Temp: 97.4 °F (36.3 °C) (12/06/24 2143)  Pulse: 82 (12/07/24 0014)  Resp: 20 (12/06/24 2143)  BP: 131/87 (12/06/24 2143)  SpO2: 100 % (12/06/24 2143) Vital Signs (24h Range):  Temp:  [97.4 °F (36.3 °C)-97.8 °F (36.6 °C)] 97.4 °F (36.3 °C)  Pulse:  [70-83] 82  Resp:  [16-59] 20  SpO2:  [100 %] 100 %  BP: (105-131)/(76-92) 131/87     Weight: 76.9 kg (169 lb 8.5 oz)  Body mass index is 25.78 kg/m².     Physical Exam  Vitals reviewed.   Constitutional:       General: She is not in acute distress.     Appearance: Normal appearance. She is not ill-appearing, toxic-appearing or diaphoretic.   HENT:      Head: Normocephalic and atraumatic.   Eyes:      General: No scleral icterus.     Extraocular Movements: Extraocular movements intact.      Pupils:  Pupils are equal, round, and reactive to light.   Cardiovascular:      Rate and Rhythm: Normal rate and regular rhythm.      Heart sounds: Normal heart sounds. No murmur heard.     No friction rub. No gallop.   Pulmonary:      Effort: No respiratory distress.      Breath sounds: No stridor. No wheezing, rhonchi or rales.   Abdominal:      General: Abdomen is flat. Bowel sounds are normal. There is no distension.      Palpations: There is no mass.      Tenderness: There is no abdominal tenderness. There is no guarding.      Hernia: No hernia is present.   Musculoskeletal:      Cervical back: Normal range of motion.      Right lower leg: No edema.      Left lower leg: No edema.   Neurological:      General: No focal deficit present.      Mental Status: She is alert and oriented to person, place, and time.              CRANIAL NERVES     CN III, IV, VI   Pupils are equal, round, and reactive to light.       Significant Labs: All pertinent labs within the past 24 hours have been reviewed.  CBC:   Recent Labs   Lab 12/06/24  1845   WBC 6.55   HGB 9.1*   HCT 26.7*        CMP:   Recent Labs   Lab 12/06/24  0957 12/06/24  1845   * 133*   K 4.7 4.4    106   CO2 21* 19*    97   BUN 12 11   CREATININE 1.1 1.1   CALCIUM 9.6 9.0   PROT 7.6 6.9   ALBUMIN 3.9 3.7   BILITOT 0.8 0.6   ALKPHOS 57 54   AST 41* 30   ALT 19 14   ANIONGAP 9 8       Significant Imaging: I have reviewed all pertinent imaging results/findings within the past 24 hours.  Assessment/Plan:     * Chest pain  Patient presented with one day of chest pain radiating to the back  History of NSTEMI, had a C which showed takotsubo cardiomyopathy  Initial troponin negative. EKG showed ST depressions on inferior leads which was no change from previous EKG  Likely has chest pain 2/2 takotsubo    Plan:  Monitor on tele  Trend trop/EKG  Repeat ECHO  Consult cardiology      Takotsubo cardiomyopathy  Patient presented with chest pain about 1 month  ago, at the time had  an NSTEMI.  Previously found to have takotsubo cardiomyopathy on [facility]    Plan:  Repeat ECHO  Consult cardiology      Pure hypercholesterolemia  Resume home atorvastatin 40      Essential hypertension  Patient's blood pressure range in the last 24 hours was: BP  Min: 105/76  Max: 131/87.The patient's inpatient anti-hypertensive regimen is listed below:  Current Antihypertensives  losartan tablet 100 mg, Daily, Oral  metoprolol succinate (TOPROL-XL) 24 hr tablet 25 mg, Daily, Oral  spironolactone tablet 50 mg, Daily, Oral    Plan  - BP is controlled, no changes needed to their regimen      Other secondary gout, multiple sites  Resume home allopurinol        VTE Risk Mitigation (From admission, onward)           Ordered     heparin (porcine) injection 5,000 Units  Every 8 hours         12/06/24 2125     IP VTE HIGH RISK PATIENT  Once         12/06/24 2125     Place sequential compression device  Until discontinued         12/06/24 2125                           On 12/07/2024, patient should be placed in hospital observation services under my care in collaboration with Dr. Mane.    Wilmer Navarrete MD  [facility] Family Medicine, PGY-2  12/07/2024

## 2024-12-07 NOTE — ASSESSMENT & PLAN NOTE
Patient presented with chest pain about 1 month ago, at the time had  an NSTEMI.  Previously found to have takotsubo cardiomyopathy on [facility]    Plan:  Repeat ECHO  Consult cardiology

## 2024-12-07 NOTE — PLAN OF CARE
Problem: Adult Inpatient Plan of Care  Goal: Plan of Care Review  Outcome: Progressing     VIRTUAL NURSE:  Cued into patient's room.  Permission received per patient to turn camera to view patient.  Introduced as VN for night shift that will be working with floor nurse and nursing assistant.  Educated patient on VN's role in patient care and  VIP model.  Plan of care reviewed with patient.  Education per flowsheet.   Informed patient that staff will round on them every 2 hours but to use call light for any other needs they may have; informed of fall risk and fall precautions.  Patient verbalized understanding.  Call light within reach; bed siderails up x3.  Opportunity given for questions and questions answered.  Admission assessment questions answered.  Patient denies complaints or any needs at this time. Instructed to call for assistance.  Will cont to monitor and intervene as needed.    Labs, notes, orders, and careplan initiated.       12/06/24 5159   Patient Request   Patient Requested no complaints or needs currently   Admission   Initial VN Admission Questions Complete   Communication Issues? Technical Issue   Shift   Virtual Nurse - Rounding Complete   Pain Management Interventions pain management plan reviewed with patient/caregiver   Virtual Nurse - Patient Verbalized Approval Of Camera Use;VN Rounding   Type of Frequent Check   Type Patient Rounds   Safety/Activity   Patient Rounds bed in low position;placement of personal items at bedside;bed wheels locked;call light in patient/parent reach;visualized patient;clutter free environment maintained   Safety Promotion/Fall Prevention assistive device/personal item within reach;high risk medications identified;Fall Risk reviewed with patient/family;diversional activities provided;medications reviewed;nonskid shoes/socks when out of bed;room near unit station;side rails raised x 3;supervised activity;Supervised toileting - stay within arms reach;instructed  to call staff for mobility   Safety Precautions emergency equipment at bedside   Positioning   Body Position neutral body alignment;neutral head position;position changed independently   Head of Bed (HOB) Positioning HOB at 60-90 degrees   Pain/Comfort/Sleep   Preferred Pain Scale number (Numeric Rating Pain Scale)   Comfort/Acceptable Pain Level 0   Pain Rating (0-10): Rest 0   POSS (Pasero Opioid-Induced Sed Scale) 1 - Awake and alert   Sleep/Rest/Relaxation no problem identified;awake

## 2024-12-07 NOTE — PLAN OF CARE
[facility] - Telemetry  Initial Discharge Assessment       Primary Care Provider: Steve Skelton III, MD    Admission Diagnosis: Chest pain at rest [R07.9]  Chest pain [R07.9]    Admission Date: 12/6/2024  Expected Discharge Date: 12/7/2024    Consult: krista    Payor: MEDICAID / Plan: [employer] [facility] ([employer]) / Product Type: Managed Medicaid /     Extended Emergency Contact Information  Primary Emergency Contact: Keegan Lawrence  Address: [address] [place] [address]           [place] [address] [facility]  Home Phone: [phone]  Mobile Phone: [phone]  Relation: Significant other    Discharge Plan A: (P) Home with family  Discharge Plan B: (P) Home Health      [id number] - [place], [place] - [address] [place] [address]  Phone: [phone] Fax: [phone]      Initial Assessment (most recent)       Adult Discharge Assessment - 12/07/24 0904          Discharge Assessment    Assessment Type Discharge Planning Assessment     Confirmed/corrected address, phone number and insurance Yes     Confirmed Demographics Correct on Facesheet     Source of Information patient     People in Home significant other   s.o., Keegan Lawrence ([phone])    Do you expect to return to your current living situation? Yes     Do you have help at home or someone to help you manage your care at home? Yes     Prior to hospitilization cognitive status: Alert/Oriented     Current cognitive status: Alert/Oriented     Equipment Currently Used at Home none     Readmission within 30 days? Yes     Patient currently being followed by outpatient case management? No     Do you currently have service(s) that help you manage your care at home? No     Do you take prescription medications? Yes     Do you have prescription coverage? Yes     Do you have any problems affording any of your prescribed medications? No     Is the patient taking medications as prescribed? yes     How do you get to  doctors appointments? car, drives self;family or friend will provide     Are you on dialysis? No     Do you take coumadin? No     Discharge Plan A Home with family (P)      Discharge Plan B Home Health (P)      DME Needed Upon Discharge  none (P)      Discharge Plan discussed with: Patient (P)                       0900  Patient resting quietly in bed with s.o., Keegan Lawrence ([phone]), at the bedside when CM rounded via [employer]. Patient was admitted with chest pain & is being followed by [facility]. Pt was recently diagnosed NSTEMI/takotsubo cardiomyopathy. Pt denied chest pain at this time.     Patient lives with her s.o., is independent of all ADLs, & denied the need for assistance with transportation at time of discharge.     CM informed the pt of schedulers  appointments with Dr Fong (cards) on 12/12/2024 at 1000 & Dr Steve Skelton (PCP) on 1/14/2025 at 1100. Pt verbalized understanding.    1320  CM was informed by Dr Farr ([facility]) that the pt is medically stable to dc today.    Patient resting quietly in bed when CM rounded via [employer]. Patient in agreement with plan to discharge home today, denied the need for assistance with transportation at time of discharge.    1330  Message sent to nurse Michelle, charge nurse Rosalia, & virtual nurse Miladys informing that the pt is cleared to discharge.       Will continue to follow.

## 2024-12-07 NOTE — ED NOTES
First contact with patient: 12/6/2024 7:07 PM     HPI  Treniece Nicole Meng is a 49 y.o. female complaining of 8/10 chest pain x 1 day. Hx: Prior admission d/t chest pain, HTN. Patient states she took Nitro x5 PTA, no relief reported. Denies SOB. Patient is alert and oriented x 4, respirations even and unlabored, skin color normal, dry, and intact. No acute distress noted. VSS. Patient attached to tele and cont pulse ox, bed low flat and locked. Side rails up x 2. Call light within reach. No acute distress noted.  at bedside.        Initial meet  Introduced myself to patient. All questions and concerns were addressed. Fall precautions maintained. Call bell within reach.     Past Medical History  Past Medical History:   Diagnosis Date    Hypertension

## 2024-12-07 NOTE — NURSING
Pt to be discharged home. AVS given and VN went over AVS w/ pt. IV and heart monitor removed. Call light in reach, safety measures in place.

## 2024-12-07 NOTE — DISCHARGE SUMMARY
[facility] Medicine  Discharge Summary      Patient Name: José Miguel Meng  MRN: [id number]  CHANCE: [id number]  Patient Class: OP- Observation  Admission Date: 12/6/2024  Hospital Length of Stay: 0 days  Discharge Date and Time:  12/07/2024 1:37 PM  Attending Physician: Salvador Cota MD   Discharging Provider: Keegan Farr MD  Primary Care Provider: Steve Skelton III, MD    Primary Care Team: Networked reference to record PCT     HPI:   Patient is a 49 y.o.-year-old female w/ PMHx hypertension, HFpEF, and recently diagnosed NSTEMI/takotsubo cardiomyopathy who presents to the ED with reports of chest pain/pressure associated with radiation to the shoulder. Patient states that the chest pain started yesterday and today it worsened with the radiation to the shoulder. She took nitroglycerin at home without relief of the pain which prompted her to come to th ED. She states that the chest pain felt similar to when she came in and was diagnosed with takotsubo. She denies any associated shortness of breath, no recent fevers, chills, diarrhea, or constipation, no sick contacts. She endorses that the chest pain and fatigue was worse with physical exertion.      In the ED, initial vitals Temp 97.8F, /78, HR 78, RR 18, SpO2 100% on room air. CBC significant for H/H of 9.1/26.7, CMP was WNL. And Estimated Creatinine Clearance: 67.5 mL/min (based on SCr of 1.1 mg/dL). BNP negative, initial troponin negative.   CXR showed no acute abnormalities, EKG showed normal sinus rhythm, and t wave depressions on inferior leads similar to previous EKGs from last month  In the ED patient was treated with  and norco. [facility] admitted patient for ACS rule out    * No surgery found *      Hospital Course:   HPI as above. Patient seen by Cardiology for high risk chest pain in setting of negative troponin's although T wave depressions seen on EKG, which were similar to prior EKG's last month.  Cardiology was consulted, recommend decreasing losartan to 50mg QD as well as increased metoprolol to 25 BID. An echocardiogram was done which was improved from prior echo with no atypical findings to explain chest pain. Patient was stable for discharge. She was instructed to follow up with outpatient cardiology and PCP. All questions answered. Patient verbalized understanding.      Goals of Care Treatment Preferences:  Code Status: Full Code      SDOH Screening:  The patient was screened for utility difficulties, food insecurity, transport difficulties, housing insecurity, and interpersonal safety and there were no concerns identified this admission.     Consults:   Consults (From admission, onward)          Status Ordering Provider     Inpatient consult to Cardiology-[facility]  Once        Provider:  (Not yet assigned)    Completed SHRUTHI YANEZ JR            * Chest pain  Patient presented with one day of chest pain radiating to the back  History of NSTEMI, had a LHC which showed takotsubo cardiomyopathy  Initial troponin negative. EKG showed ST depressions on inferior leads which was no change from previous EKG  Likely has chest pain 2/2 takotsubo    Plan:  Monitor on tele  Repeat ECHO  Consult cardiology    Takotsubo cardiomyopathy  Patient presented with chest pain about 1 month ago, at the time had  an NSTEMI.  Previously found to have takotsubo cardiomyopathy on LHC    Plan:  Repeat ECHO  Consult cardiology    Pure hypercholesterolemia  Resume home atorvastatin 40      Essential hypertension  Patient's blood pressure range in the last 24 hours was: BP  Min: 105/76  Max: 134/74.The patient's inpatient anti-hypertensive regimen is listed below:  Current Antihypertensives  spironolactone tablet 50 mg, Daily, Oral  losartan tablet 50 mg, Daily, Oral  metoprolol succinate (TOPROL-XL) 24 hr tablet 25 mg, 2 times daily, Oral    Plan  - BP is controlled, no changes needed to their regimen      Other secondary  gout, multiple sites  Resume home allopurinol        Final Active Diagnoses:    Diagnosis Date Noted POA    PRINCIPAL PROBLEM:  Chest pain [R07.9] 11/09/2024 Yes    Takotsubo cardiomyopathy [I51.81] 12/06/2024 Unknown    Pure hypercholesterolemia [E78.00] 12/05/2023 Yes    Other secondary gout, multiple sites [M10.49] 06/27/2022 Yes    Essential hypertension [I10] 06/27/2022 Yes      Problems Resolved During this Admission:       Discharged Condition: good    Disposition: Home or Self Care    Follow Up:   Follow-up Information       Jorden Fong MD Follow up on 12/12/2024.    Specialties: Interventional Cardiology, Cardiology  Why: at 10:00 AM; previously scheduled cardiology appointment  Contact information:  [address]  [address]  [place] [address]  [phone]               Steve Skelton III, MD Follow up on 1/14/2025.    Specialty: Internal Medicine  Why: at 11:00 AM; previously scheduled PCP appointment  Contact information:  [address] [facility]  [place] [address]  [phone]                           Patient Instructions:      Notify your health care provider if you experience any of the following:  temperature >100.4     Notify your health care provider if you experience any of the following:  persistent nausea and vomiting or diarrhea     Notify your health care provider if you experience any of the following:  severe uncontrolled pain     Notify your health care provider if you experience any of the following:  redness, tenderness, or signs of infection (pain, swelling, redness, odor or green/yellow discharge around incision site)     Notify your health care provider if you experience any of the following:  difficulty breathing or increased cough     Notify your health care provider if you experience any of the following:  worsening rash     Notify your health care provider if you experience any of the following:  severe persistent headache     Notify your health care provider if you experience  any of the following:  persistent dizziness, light-headedness, or visual disturbances     Notify your health care provider if you experience any of the following:  increased confusion or weakness     Activity as tolerated       Significant Diagnostic Studies: Labs: CMP   Recent Labs   Lab 12/06/24  0957 12/06/24  1845 12/07/24  0147   * 133* 132*   K 4.7 4.4 3.5    106 105   CO2 21* 19* 18*    97 94   BUN 12 11 11   CREATININE 1.1 1.1 1.1   CALCIUM 9.6 9.0 9.0   PROT 7.6 6.9 6.8   ALBUMIN 3.9 3.7 3.6   BILITOT 0.8 0.6 0.6   ALKPHOS 57 54 59   AST 41* 30 32   ALT 19 14 13   ANIONGAP 9 8 9    and CBC   Recent Labs   Lab 12/06/24  1845 12/07/24  0147   WBC 6.55 5.01   HGB 9.1* 9.4*   HCT 26.7* 27.7*    228       Pending Diagnostic Studies:       None           Medications:  Reconciled Home Medications:      Medication List        CHANGE how you take these medications      losartan 50 MG tablet  Commonly known as: COZAAR  Take 1 tablet (50 mg total) by mouth once daily.  Start taking on: December 8, 2024  What changed:   medication strength  how much to take  when to take this     metoprolol succinate 25 MG 24 hr tablet  Commonly known as: TOPROL-XL  Take 1 tablet (25 mg total) by mouth 2 (two) times daily.  What changed: when to take this            CONTINUE taking these medications      allopurinoL 300 MG tablet  Commonly known as: ZYLOPRIM  TAKE 1 TABLET BY MOUTH EVERY DAY     aspirin 81 MG Chew  Take 1 tablet (81 mg total) by mouth once daily.     atorvastatin 40 MG tablet  Commonly known as: LIPITOR  Take 1 tablet (40 mg total) by mouth every evening.     clopidogreL 75 mg tablet  Commonly known as: PLAVIX  Take 1 tablet (75 mg total) by mouth once daily.     EScitalopram oxalate 5 MG Tab  Commonly known as: LEXAPRO  Take 1 tablet (5 mg total) by mouth once daily.     ibuprofen 200 MG tablet  Commonly known as: ADVIL,MOTRIN  Take 200-800 mg by mouth every 6 (six) hours as needed for  Pain.     nitroGLYCERIN 0.4 MG SL tablet  Commonly known as: NITROSTAT  Place 1 tablet (0.4 mg total) under the tongue every 5 (five) minutes as needed for Chest pain.     pantoprazole 40 MG tablet  Commonly known as: PROTONIX  Take 1 tablet (40 mg total) by mouth every morning.     spironolactone 50 MG tablet  Commonly known as: ALDACTONE  Take 1 tablet (50 mg total) by mouth once daily.              Indwelling Lines/Drains at time of discharge:   Lines/Drains/Airways       None                   Time spent on the discharge of patient: 35 minutes         Keegan Farr MD  Department of Hospital Medicine  [facility]

## 2024-12-07 NOTE — SUBJECTIVE & OBJECTIVE
Interval History: NAEON. Per Cardiology, pending echo and if stable can discharge.     Review of Systems   Constitutional:  Negative for chills, fatigue and fever.   Respiratory:  Negative for cough, choking, chest tightness, shortness of breath and wheezing.    Cardiovascular:  Negative for chest pain, palpitations and leg swelling.   Gastrointestinal:  Negative for diarrhea, nausea and vomiting.   Genitourinary:  Negative for dysuria.   Musculoskeletal:  Negative for back pain.   Skin:  Negative for pallor, rash and wound.     Objective:     Vital Signs (Most Recent):  Temp: 98.2 °F (36.8 °C) (12/07/24 0754)  Pulse: 88 (12/07/24 0754)  Resp: 18 (12/07/24 0754)  BP: 108/74 (12/07/24 0754)  SpO2: 100 % (12/07/24 0754) Vital Signs (24h Range):  Temp:  [97.4 °F (36.3 °C)-98.5 °F (36.9 °C)] 98.2 °F (36.8 °C)  Pulse:  [70-88] 88  Resp:  [16-59] 18  SpO2:  [100 %] 100 %  BP: (105-134)/(74-92) 108/74     Weight: 76.9 kg (169 lb 8.5 oz)  Body mass index is 25.78 kg/m².    Intake/Output Summary (Last 24 hours) at 12/7/2024 1104  Last data filed at 12/7/2024 0638  Gross per 24 hour   Intake 360 ml   Output --   Net 360 ml         Physical Exam  Vitals reviewed.   Constitutional:       General: She is not in acute distress.     Appearance: Normal appearance. She is not ill-appearing, toxic-appearing or diaphoretic.   HENT:      Head: Normocephalic and atraumatic.   Eyes:      General: No scleral icterus.     Extraocular Movements: Extraocular movements intact.      Pupils: Pupils are equal, round, and reactive to light.   Cardiovascular:      Rate and Rhythm: Normal rate and regular rhythm.      Heart sounds: Normal heart sounds. No murmur heard.     No friction rub. No gallop.   Pulmonary:      Effort: No respiratory distress.      Breath sounds: No stridor. No wheezing, rhonchi or rales.   Abdominal:      General: Abdomen is flat. Bowel sounds are normal. There is no distension.      Palpations: There is no mass.       Tenderness: There is no abdominal tenderness. There is no guarding.      Hernia: No hernia is present.   Musculoskeletal:      Cervical back: Normal range of motion.      Right lower leg: No edema.      Left lower leg: No edema.   Neurological:      General: No focal deficit present.      Mental Status: She is alert and oriented to person, place, and time.             Significant Labs: All pertinent labs within the past 24 hours have been reviewed.  CBC:   Recent Labs   Lab 12/06/24  1845 12/07/24  0147   WBC 6.55 5.01   HGB 9.1* 9.4*   HCT 26.7* 27.7*    228     CMP:   Recent Labs   Lab 12/06/24  0957 12/06/24  1845 12/07/24  0147   * 133* 132*   K 4.7 4.4 3.5    106 105   CO2 21* 19* 18*    97 94   BUN 12 11 11   CREATININE 1.1 1.1 1.1   CALCIUM 9.6 9.0 9.0   PROT 7.6 6.9 6.8   ALBUMIN 3.9 3.7 3.6   BILITOT 0.8 0.6 0.6   ALKPHOS 57 54 59   AST 41* 30 32   ALT 19 14 13   ANIONGAP 9 8 9       Significant Imaging: I have reviewed all pertinent imaging results/findings within the past 24 hours.

## 2024-12-07 NOTE — HPI
Patient is a 49 y.o.-year-old female w/ PMHx hypertension, HFpEF, and recently diagnosed NSTEMI/takotsubo cardiomyopathy who presents to the ED with reports of chest pain/pressure associated with radiation to the shoulder. Patient states that the chest pain started yesterday and today it worsened with the radiation to the shoulder. She took nitroglycerin at home without relief of the pain which prompted her to come to  ED. She states that the chest pain felt similar to when she came in and was diagnosed with takotsubo. She denies any associated shortness of breath, no recent fevers, chills, diarrhea, or constipation, no sick contacts. She endorses that the chest pain and fatigue was worse with physical exertion.      In the ED, initial vitals Temp 97.8F, /78, HR 78, RR 18, SpO2 100% on room air. CBC significant for H/H of 9.1/26.7, CMP was WNL. And Estimated Creatinine Clearance: 67.5 mL/min (based on SCr of 1.1 mg/dL). BNP negative, initial troponin negative.   CXR showed no acute abnormalities, EKG showed normal sinus rhythm, and t wave depressions on inferior leads similar to previous EKGs from last month  In the ED patient was treated with  and norco. [facility] admitted patient for ACS rule out

## 2024-12-07 NOTE — ASSESSMENT & PLAN NOTE
Patient's blood pressure range in the last 24 hours was: BP  Min: 105/76  Max: 131/87.The patient's inpatient anti-hypertensive regimen is listed below:  Current Antihypertensives  losartan tablet 100 mg, Daily, Oral  metoprolol succinate (TOPROL-XL) 24 hr tablet 25 mg, Daily, Oral  spironolactone tablet 50 mg, Daily, Oral    Plan  - BP is controlled, no changes needed to their regimen

## 2024-12-07 NOTE — PROGRESS NOTES
[facility] Medicine  Progress Note    Patient Name: José Miguel Meng  MRN: [id number]  Patient Class: OP- Observation   Admission Date: 12/6/2024  Length of Stay: 0 days  Attending Physician: Salvdaor Cota MD  Primary Care Provider: Steve Skelton III, MD        Subjective     Principal Problem:Chest pain        HPI:  Patient is a 49 y.o.-year-old female w/ PMHx hypertension, HFpEF, and recently diagnosed NSTEMI/takotsubo cardiomyopathy who presents to the ED with reports of chest pain/pressure associated with radiation to the shoulder. Patient states that the chest pain started yesterday and today it worsened with the radiation to the shoulder. She took nitroglycerin at home without relief of the pain which prompted her to come to  ED. She states that the chest pain felt similar to when she came in and was diagnosed with takotsubo. She denies any associated shortness of breath, no recent fevers, chills, diarrhea, or constipation, no sick contacts. She endorses that the chest pain and fatigue was worse with physical exertion.      In the ED, initial vitals Temp 97.8F, /78, HR 78, RR 18, SpO2 100% on room air. CBC significant for H/H of 9.1/26.7, CMP was WNL. And Estimated Creatinine Clearance: 67.5 mL/min (based on SCr of 1.1 mg/dL). BNP negative, initial troponin negative.   CXR showed no acute abnormalities, EKG showed normal sinus rhythm, and t wave depressions on inferior leads similar to previous EKGs from last month  In the ED patient was treated with  and norco. [facility] admitted patient for ACS rule out    Overview/Hospital Course:  No notes on file    Interval History: NAEON. Per Cardiology, pending echo and if stable can discharge.     Review of Systems   Constitutional:  Negative for chills, fatigue and fever.   Respiratory:  Negative for cough, choking, chest tightness, shortness of breath and wheezing.    Cardiovascular:  Negative for chest pain,  palpitations and leg swelling.   Gastrointestinal:  Negative for diarrhea, nausea and vomiting.   Genitourinary:  Negative for dysuria.   Musculoskeletal:  Negative for back pain.   Skin:  Negative for pallor, rash and wound.     Objective:     Vital Signs (Most Recent):  Temp: 98.2 °F (36.8 °C) (12/07/24 0754)  Pulse: 88 (12/07/24 0754)  Resp: 18 (12/07/24 0754)  BP: 108/74 (12/07/24 0754)  SpO2: 100 % (12/07/24 0754) Vital Signs (24h Range):  Temp:  [97.4 °F (36.3 °C)-98.5 °F (36.9 °C)] 98.2 °F (36.8 °C)  Pulse:  [70-88] 88  Resp:  [16-59] 18  SpO2:  [100 %] 100 %  BP: (105-134)/(74-92) 108/74     Weight: 76.9 kg (169 lb 8.5 oz)  Body mass index is 25.78 kg/m².    Intake/Output Summary (Last 24 hours) at 12/7/2024 1104  Last data filed at 12/7/2024 0638  Gross per 24 hour   Intake 360 ml   Output --   Net 360 ml         Physical Exam  Vitals reviewed.   Constitutional:       General: She is not in acute distress.     Appearance: Normal appearance. She is not ill-appearing, toxic-appearing or diaphoretic.   HENT:      Head: Normocephalic and atraumatic.   Eyes:      General: No scleral icterus.     Extraocular Movements: Extraocular movements intact.      Pupils: Pupils are equal, round, and reactive to light.   Cardiovascular:      Rate and Rhythm: Normal rate and regular rhythm.      Heart sounds: Normal heart sounds. No murmur heard.     No friction rub. No gallop.   Pulmonary:      Effort: No respiratory distress.      Breath sounds: No stridor. No wheezing, rhonchi or rales.   Abdominal:      General: Abdomen is flat. Bowel sounds are normal. There is no distension.      Palpations: There is no mass.      Tenderness: There is no abdominal tenderness. There is no guarding.      Hernia: No hernia is present.   Musculoskeletal:      Cervical back: Normal range of motion.      Right lower leg: No edema.      Left lower leg: No edema.   Neurological:      General: No focal deficit present.      Mental Status: She  is alert and oriented to person, place, and time.             Significant Labs: All pertinent labs within the past 24 hours have been reviewed.  CBC:   Recent Labs   Lab 12/06/24  1845 12/07/24  0147   WBC 6.55 5.01   HGB 9.1* 9.4*   HCT 26.7* 27.7*    228     CMP:   Recent Labs   Lab 12/06/24  0957 12/06/24  1845 12/07/24  0147   * 133* 132*   K 4.7 4.4 3.5    106 105   CO2 21* 19* 18*    97 94   BUN 12 11 11   CREATININE 1.1 1.1 1.1   CALCIUM 9.6 9.0 9.0   PROT 7.6 6.9 6.8   ALBUMIN 3.9 3.7 3.6   BILITOT 0.8 0.6 0.6   ALKPHOS 57 54 59   AST 41* 30 32   ALT 19 14 13   ANIONGAP 9 8 9       Significant Imaging: I have reviewed all pertinent imaging results/findings within the past 24 hours.    Assessment and Plan     * Chest pain  Patient presented with one day of chest pain radiating to the back  History of NSTEMI, had a LHC which showed takotsubo cardiomyopathy  Initial troponin negative. EKG showed ST depressions on inferior leads which was no change from previous EKG  Likely has chest pain 2/2 takotsubo    Plan:  Monitor on tele  Repeat ECHO  Consult cardiology    Takotsubo cardiomyopathy  Patient presented with chest pain about 1 month ago, at the time had  an NSTEMI.  Previously found to have takotsubo cardiomyopathy on LHC    Plan:  Repeat ECHO  Consult cardiology    Pure hypercholesterolemia  Resume home atorvastatin 40      Essential hypertension  Patient's blood pressure range in the last 24 hours was: BP  Min: 105/76  Max: 134/74.The patient's inpatient anti-hypertensive regimen is listed below:  Current Antihypertensives  spironolactone tablet 50 mg, Daily, Oral  losartan tablet 50 mg, Daily, Oral  metoprolol succinate (TOPROL-XL) 24 hr tablet 25 mg, 2 times daily, Oral    Plan  - BP is controlled, no changes needed to their regimen      Other secondary gout, multiple sites  Resume home allopurinol        VTE Risk Mitigation (From admission, onward)           Ordered     heparin  (porcine) injection 5,000 Units  Every 8 hours         12/06/24 2125     IP VTE HIGH RISK PATIENT  Once         12/06/24 2125     Place sequential compression device  Until discontinued         12/06/24 2125                    Discharge Planning   DELMI:      Code Status: Full Code   Medical Readiness for Discharge Date:   Discharge Plan A: Home with family          Keegan Farr MD  Department of [facility] Medicine   [facility]

## 2024-12-07 NOTE — ED NOTES
I received this patient.  She is alert and oriented x3.  Denies chest pain at this time.  Family at bedside.

## 2024-12-07 NOTE — SUBJECTIVE & OBJECTIVE
Past Medical History:   Diagnosis Date    Hypertension        Past Surgical History:   Procedure Laterality Date    BREAST SURGERY  02/15/2019    Reduction    HYSTERECTOMY      LEFT HEART CATHETERIZATION Left 11/11/2024    Procedure: Left heart cath;  Surgeon: Jorden Fong MD;  Location: [facility] CATH LAB/EP;  Service: Cardiology;  Laterality: Left;    REDUCTION OF BOTH BREASTS Bilateral 02/15/2019    Procedure: MAMMOPLASTY, REDUCTION, BILATERAL;  Surgeon: Rajinder Aldrich MD;  Location: [address];  Service: Plastics;  Laterality: Bilateral;    TOTAL REDUCTION MAMMOPLASTY  02/14/2019    TUBAL LIGATION      TUBAL LIGATION      VENTRICULOGRAM, LEFT  11/11/2024    Procedure: Ventriculogram, Left;  Surgeon: Jorden Fong MD;  Location: [facility] CATH LAB/EP;  Service: Cardiology;;       Review of patient's allergies indicates:  No Known Allergies    No current facility-administered medications on file prior to encounter.     Current Outpatient Medications on File Prior to Encounter   Medication Sig    allopurinoL (ZYLOPRIM) 300 MG tablet TAKE 1 TABLET BY MOUTH EVERY DAY (Patient taking differently: Take 300 mg by mouth once daily.)    aspirin 81 MG Chew Take 1 tablet (81 mg total) by mouth once daily.    atorvastatin (LIPITOR) 40 MG tablet Take 1 tablet (40 mg total) by mouth every evening.    clopidogreL (PLAVIX) 75 mg tablet Take 1 tablet (75 mg total) by mouth once daily.    EScitalopram oxalate (LEXAPRO) 5 MG Tab Take 1 tablet (5 mg total) by mouth once daily.    ibuprofen (ADVIL,MOTRIN) 200 MG tablet Take 200-800 mg by mouth every 6 (six) hours as needed for Pain.    losartan (COZAAR) 100 MG tablet TAKE 1 TABLET BY MOUTH EVERY DAY (Patient taking differently: Take 100 mg by mouth once daily.)    metoprolol succinate (TOPROL-XL) 25 MG 24 hr tablet Take 1 tablet (25 mg total) by mouth once daily.    nitroGLYCERIN (NITROSTAT) 0.4 MG SL tablet Place 1 tablet (0.4 mg total) under the tongue every 5 (five) minutes as  needed for Chest pain.    pantoprazole (PROTONIX) 40 MG tablet Take 1 tablet (40 mg total) by mouth every morning.    spironolactone (ALDACTONE) 50 MG tablet Take 1 tablet (50 mg total) by mouth once daily.     Family History       Problem Relation (Age of Onset)    Heart disease Sister (45)    Hypertension Mother, Father    Stroke Father    Vision loss Maternal Grandmother          Tobacco Use    Smoking status: Never     Passive exposure: Current    Smokeless tobacco: Never   Substance and Sexual Activity    Alcohol use: Yes     Alcohol/week: 5.0 standard drinks of alcohol     Types: 5 Glasses of wine per week     Comment: socially    Drug use: No    Sexual activity: Yes     Partners: Male     Birth control/protection: Condom, Partner-Vasectomy     Review of Systems   Constitutional:  Negative for chills, fatigue and fever.   Respiratory:  Positive for shortness of breath. Negative for cough, choking, chest tightness and wheezing.    Cardiovascular:  Positive for chest pain. Negative for palpitations and leg swelling.   Gastrointestinal:  Negative for diarrhea, nausea and vomiting.   Genitourinary:  Negative for dysuria.   Musculoskeletal:  Positive for back pain.   Skin:  Negative for pallor, rash and wound.     Objective:     Vital Signs (Most Recent):  Temp: 97.4 °F (36.3 °C) (12/06/24 2143)  Pulse: 82 (12/07/24 0014)  Resp: 20 (12/06/24 2143)  BP: 131/87 (12/06/24 2143)  SpO2: 100 % (12/06/24 2143) Vital Signs (24h Range):  Temp:  [97.4 °F (36.3 °C)-97.8 °F (36.6 °C)] 97.4 °F (36.3 °C)  Pulse:  [70-83] 82  Resp:  [16-59] 20  SpO2:  [100 %] 100 %  BP: (105-131)/(76-92) 131/87     Weight: 76.9 kg (169 lb 8.5 oz)  Body mass index is 25.78 kg/m².     Physical Exam  Vitals reviewed.   Constitutional:       General: She is not in acute distress.     Appearance: Normal appearance. She is not ill-appearing, toxic-appearing or diaphoretic.   HENT:      Head: Normocephalic and atraumatic.   Eyes:      General: No  scleral icterus.     Extraocular Movements: Extraocular movements intact.      Pupils: Pupils are equal, round, and reactive to light.   Cardiovascular:      Rate and Rhythm: Normal rate and regular rhythm.      Heart sounds: Normal heart sounds. No murmur heard.     No friction rub. No gallop.   Pulmonary:      Effort: No respiratory distress.      Breath sounds: No stridor. No wheezing, rhonchi or rales.   Abdominal:      General: Abdomen is flat. Bowel sounds are normal. There is no distension.      Palpations: There is no mass.      Tenderness: There is no abdominal tenderness. There is no guarding.      Hernia: No hernia is present.   Musculoskeletal:      Cervical back: Normal range of motion.      Right lower leg: No edema.      Left lower leg: No edema.   Neurological:      General: No focal deficit present.      Mental Status: She is alert and oriented to person, place, and time.              CRANIAL NERVES     CN III, IV, VI   Pupils are equal, round, and reactive to light.       Significant Labs: All pertinent labs within the past 24 hours have been reviewed.  CBC:   Recent Labs   Lab 12/06/24  1845   WBC 6.55   HGB 9.1*   HCT 26.7*        CMP:   Recent Labs   Lab 12/06/24  0957 12/06/24  1845   * 133*   K 4.7 4.4    106   CO2 21* 19*    97   BUN 12 11   CREATININE 1.1 1.1   CALCIUM 9.6 9.0   PROT 7.6 6.9   ALBUMIN 3.9 3.7   BILITOT 0.8 0.6   ALKPHOS 57 54   AST 41* 30   ALT 19 14   ANIONGAP 9 8       Significant Imaging: I have reviewed all pertinent imaging results/findings within the past 24 hours.

## 2024-12-07 NOTE — PLAN OF CARE
[facility] - Telemetry  Discharge Final Note    Primary Care Provider: Steve Skelton III, MD    Expected Discharge Date: 12/7/2024    Final Discharge Note (most recent)       Final Note - 12/07/24 1412          Final Note    Assessment Type Final Discharge Note (P)      Anticipated Discharge Disposition Home or Self Care (P)      Hospital Resources/Appts/Education Provided Appointments scheduled and added to AVS (P)                      Contact Info       Jorden Fong MD   Specialty: Interventional Cardiology, Cardiology    [address]  [address]  [place] [address]   Phone: [phone]       Next Steps: Follow up on 12/12/2024    Instructions: at 10:00 AM; previously scheduled cardiology appointment    Steve Skelton III, MD   Specialty: Internal Medicine   Relationship: PCP - General  Hypertension Digital Medicine Responsible Provider    [address] [place] [address]  [place] [id number]   Phone: [phone]       Next Steps: Follow up on 1/14/2025    Instructions: at 11:00 AM; previously scheduled PCP appointment

## 2024-12-07 NOTE — HOSPITAL COURSE
HPI as above. Patient seen by Cardiology for high risk chest pain in setting of negative troponin's although T wave depressions seen on EKG, which were similar to prior EKG's last month. Cardiology was consulted, recommend decreasing losartan to 50mg QD as well as increased metoprolol to 25 BID. An echocardiogram was done which was improved from prior echo with no atypical findings to explain chest pain. Patient was stable for discharge. She was instructed to follow up with outpatient cardiology and PCP. All questions answered. Patient verbalized understanding.

## 2024-12-07 NOTE — ASSESSMENT & PLAN NOTE
Patient's blood pressure range in the last 24 hours was: BP  Min: 105/76  Max: 134/74.The patient's inpatient anti-hypertensive regimen is listed below:  Current Antihypertensives  spironolactone tablet 50 mg, Daily, Oral  losartan tablet 50 mg, Daily, Oral  metoprolol succinate (TOPROL-XL) 24 hr tablet 25 mg, 2 times daily, Oral    Plan  - BP is controlled, no changes needed to their regimen

## 2024-12-07 NOTE — ASSESSMENT & PLAN NOTE
Patient presented with chest pain about 1 month ago, at the time had  an NSTEMI.  Previously found to have takotsubo cardiomyopathy on [facility]    Plan:  Repeat ECHO  Consult cardiology

## 2024-12-07 NOTE — ASSESSMENT & PLAN NOTE
Patient presented with one day of chest pain radiating to the back  History of NSTEMI, had a LHC which showed takotsubo cardiomyopathy  Initial troponin negative. EKG showed ST depressions on inferior leads which was no change from previous EKG  Likely has chest pain 2/2 takotsubo    Plan:  Monitor on tele  Trend trop/EKG  Repeat ECHO  Consult cardiology

## 2024-12-10 LAB
OHS QRS DURATION: 74 MS
OHS QRS DURATION: 76 MS
OHS QRS DURATION: 80 MS
OHS QRS DURATION: 80 MS
OHS QTC CALCULATION: 438 MS
OHS QTC CALCULATION: 451 MS
OHS QTC CALCULATION: 453 MS
OHS QTC CALCULATION: 457 MS

## 2024-12-12 ENCOUNTER — OFFICE VISIT (OUTPATIENT)
Dept: CARDIOLOGY | Facility: CLINIC | Age: 49
End: 2024-12-12
Payer: MEDICAID

## 2024-12-12 VITALS
OXYGEN SATURATION: 98 % | HEIGHT: 68 IN | SYSTOLIC BLOOD PRESSURE: 93 MMHG | BODY MASS INDEX: 26.3 KG/M2 | WEIGHT: 173.5 LBS | HEART RATE: 69 BPM | DIASTOLIC BLOOD PRESSURE: 64 MMHG

## 2024-12-12 DIAGNOSIS — I51.81 TAKOTSUBO CARDIOMYOPATHY: ICD-10-CM

## 2024-12-12 DIAGNOSIS — I10 ESSENTIAL HYPERTENSION: ICD-10-CM

## 2024-12-12 DIAGNOSIS — R53.83 TIREDNESS: ICD-10-CM

## 2024-12-12 DIAGNOSIS — D50.9 IRON DEFICIENCY ANEMIA, UNSPECIFIED IRON DEFICIENCY ANEMIA TYPE: Primary | ICD-10-CM

## 2024-12-12 PROCEDURE — 99213 OFFICE O/P EST LOW 20 MIN: CPT | Mod: PBBFAC,PN | Performed by: INTERNAL MEDICINE

## 2024-12-12 PROCEDURE — 99999 PR PBB SHADOW E&M-EST. PATIENT-LVL III: CPT | Mod: PBBFAC,,, | Performed by: INTERNAL MEDICINE

## 2024-12-12 NOTE — PROGRESS NOTES
Subjective:   @Patient ID:  José Miguel Meng is a 49 y.o. female who presents for follow-up of No chief complaint on file.      HPI:       49-year-old female with past history of     reflux disease, hypertension,   strong family history of premature CAD with sister having MI in 40s, father had premature CAD in his early 50s,   Takotsubo cardiomyopathy apical hypokinesis previously noted on echo, now improved,  Recent admission to hospital for angina  Anxiety  Anemia  Hysterectomy and oophorectomy        Recently admitted to the hospital with anxiety and chest pain, no significant EKG changes.  EF improved to normal with no more hypokinesis of the apex.  Euvolemic on examination.  Noted to have hemoglobin of 9.1 that has slowly been trending down.  Up-to-date with colonoscopy and Pap smear as per the patient.        Results for orders placed during the hospital encounter of 12/06/24    Echo    Interpretation Summary    Left Ventricle: The left ventricle is mildly dilated. Normal wall thickness. There is eccentric hypertrophy. There is low normal systolic function with a visually estimated ejection fraction of 50 - 55%. There is normal diastolic function.  Improvement in apical wall motion abnormalities noted    Right Ventricle: Normal right ventricular cavity size. Wall thickness is normal. Systolic function is normal.    Mitral Valve: There is mild regurgitation.    Pulmonary Artery: The estimated pulmonary artery systolic pressure is 26 mmHg.    IVC/SVC: Normal venous pressure at 3 mmHg.      No results found for this or any previous visit.      Results for orders placed during the hospital encounter of 11/09/24    Cardiac catheterization    Conclusion    The estimated blood loss was none.    Procedure performed:  Left heart catheterization  Coronary angiogram    Indication for the procedure:  Non ST-elevation MI    Description of the procedure:  Patient was brought to the cath lab and given sedation using  Versed and fentanyl.   Prior to sedating the patient, risks and benefits of the procedure discussed with the patient, written consent obtained before proceeding with invasive angiogram.  Using ultrasound guidance and micropuncture technique a 5/6 slender sheath was placed in the right radial artery.  A tig  diagnostic catheter was used to engage the right coronary artery into diagnostic angiogram. Tig   Diagnostic catheter was then used to engage the left main coronary artery and do diagnostic angiogram.  Next the catheter was removed and exchanged over 035 wire with a 4 [place] pigtail.  The pigtail catheter was crossed into the left ventricle across the aortic valve.  LVEDP was recorded.  LV-gram was performed.  A pullback across the aortic valve was performed.  Once all the images were acquired the pigtail catheter was removed. .  TR band was used for patent hemostasis of the right radial artery:    Findings:    Right dominant coronary circulation  No significant obstructive coronary artery disease of left main coronary artery, left circumflex artery, lad, right coronary artery  EF of around 40-45% with hypokinesis of the apical to mid LV wall consistent with takotsubo cardiomyopathy. There is hyperkinesis of the basal LV walls  No significant gradient across the aortic valve.  LVEDP of 6 mm Hg    Recommendations:    Continue dual antiplatelet therapy for at least 1 year.  Transitioning to Toprol-XL, continue Aldactone, add losartan on outpatient basis as blood pressure allows.  Discontinue heparin  IV hydration  Likely may go home later this evening or tomorrow morning.  Follow up in clinic in 1 week    The procedure log was documented by Documenter: Carlyn Crenshaw RT and verified by Jorden Fong MD.    Date: 11/11/2024  Time: 1:05 PM      Please document below the medical necessity for continuous telemetry monitoring or discontinue the current order if appropriate.    Current rhythm from flowsheet:                Patient Active Problem List    Diagnosis Date Noted    Takotsubo cardiomyopathy 12/06/2024    NSTEMI (non-ST elevated myocardial infarction) 11/10/2024    Hyperlipidemia 11/10/2024    Chest pain 11/09/2024    Pure hypercholesterolemia 12/05/2023     Your total calcium score is 0     Incidental findings: Small hiatal hernia.     Impression:     Your total calcium score is 0.  The total calcium score of there is 0 is between the 0 and 25th percentile for females between ages of 45 and 49.        Electronically signed by:Andrea Sarabia MD  Date:                                            12/11/2023      Family history of early CAD 12/05/2023     Your total calcium score is 0     Incidental findings: Small hiatal hernia.     Impression:     Your total calcium score is 0.  The total calcium score of there is 0 is between the 0 and 25th percentile for females between ages of 45 and 49.        Electronically signed by:Andrea Sarabia MD  Date:                                            12/11/2023      Leg swelling subjective  08/31/2023     bnp negative  Lab Results   Component Value Date    TSH 2.541 05/25/2023     LE US negative   Not on amlodpine a      Hyponatremia 08/28/2023     Hoang Swartz MD Hill, Norman V. III, MD; Jacob Lux, ; Radha Padilla, PharmD  Hello,     Is difficult to say whether the spironolactone caused the hyponatremia however since her sodium is in the low 130s I do not think it is a big problem.  If her sodium drops under 130 then I would recommend having her stop the spironolactone and managing her blood pressure with medications aside from thiazide diuretics which can also cause hyponatremia and rechecking labs (serum osmolality, urine osmolality, urine specific gravity, urine sodium, urine creatinine, BNP) or having her come back to see us in the endocrine department.  The urine studies can not really be interpreted when on spironolactone so if she will be continuing  spironolactone and her sodium stays above 130 she probably does not need to come back and see us.       Nyrene         Syncope and collapse 06/12/2023    Impaired mobility and activities of daily living 03/28/2023    Prediabetes 06/28/2022    Normocytic anemia 06/27/2022    Other secondary gout, multiple sites 06/27/2022    Vitamin D deficiency 06/27/2022    Essential hypertension 06/27/2022    Class 1 obesity due to excess calories with serious comorbidity and body mass index (BMI) of 33.0 to 33.9 in adult 06/27/2022    Macromastia 02/15/2019    Menometrorrhagia 04/12/2016                    LAST HbA1c  Lab Results   Component Value Date    HGBA1C 5.0 12/06/2024       Lipid panel  Lab Results   Component Value Date    CHOL 151 12/06/2024    CHOL 235 (H) 11/10/2024    CHOL 228 (H) 06/03/2024     Lab Results   Component Value Date    HDL 76 (H) 12/06/2024     (H) 11/10/2024    HDL 60 06/03/2024     Lab Results   Component Value Date    LDLCALC 29.2 (L) 12/06/2024    LDLCALC 100.4 11/10/2024    LDLCALC Invalid, Trig>400.0 06/03/2024     Lab Results   Component Value Date    TRIG 229 (H) 12/06/2024    TRIG 138 11/10/2024    TRIG 437 (H) 06/03/2024     Lab Results   Component Value Date    CHOLHDL 50.3 (H) 12/06/2024    CHOLHDL 45.5 11/10/2024    CHOLHDL 26.3 06/03/2024            Review of Systems   Constitutional: Negative for chills and fever.        Generalized feeling of weakness and tiredness   HENT:  Negative for hearing loss and nosebleeds.    Eyes:  Negative for blurred vision.   Cardiovascular:  Negative for chest pain, leg swelling and palpitations.   Respiratory:  Negative for hemoptysis and shortness of breath.    Hematologic/Lymphatic: Negative for bleeding problem.   Skin:  Negative for itching.   Musculoskeletal:  Negative for falls.   Gastrointestinal:  Negative for abdominal pain and hematochezia.   Genitourinary:  Negative for hematuria.   Neurological:  Negative for dizziness and loss of  balance.   Psychiatric/Behavioral:  Negative for altered mental status and depression.        Objective:   Physical Exam  Constitutional:       Appearance: She is well-developed.   HENT:      Head: Normocephalic and atraumatic.   Eyes:      Conjunctiva/sclera: Conjunctivae normal.   Neck:      Vascular: No carotid bruit or JVD.   Cardiovascular:      Rate and Rhythm: Normal rate and regular rhythm.      Pulses:           Carotid pulses are 2+ on the right side and 2+ on the left side.       Radial pulses are 2+ on the right side and 2+ on the left side.      Heart sounds: Normal heart sounds. No murmur heard.     No friction rub. No gallop.   Pulmonary:      Effort: Pulmonary effort is normal. No respiratory distress.      Breath sounds: Normal breath sounds. No stridor. No wheezing.   Musculoskeletal:      Cervical back: Neck supple.   Skin:     General: Skin is warm and dry.   Neurological:      Mental Status: She is alert and oriented to person, place, and time.   Psychiatric:         Behavior: Behavior normal.         Assessment:     1. Iron deficiency anemia, unspecified iron deficiency anemia type    2. Takotsubo cardiomyopathy    3. Essential hypertension    4. Tiredness        Plan:     - I am concerned that the patient's main complaint is coming from anxiety and significant anemia that is slowly worsening.  Basic anemia lab workup has been sent  - follow up with PCP  - continue aspirin and Plavix till 11/11/2025 after that stop Plavix.  - if blood pressure lower than 100/70 can stop Aldactone.  Continue Toprol-XL 25 mg twice a day and losartan  - follow up within 6 months to 1 year    Pertinent cardiac images and EKG reviewed independently.    Continue with current medical plan and lifestyle changes.  Return sooner for concerns or questions. If symptoms persist go to the ED  I have reviewed all pertinent data including patient's medical history in detail and updated the computerized patient record.      Orders Placed This Encounter   Procedures    Iron and TIBC     Standing Status:   Future     Standing Expiration Date:   3/12/2026     Order Specific Question:   Send normal result to authorizing provider's In Basket if patient is active on MyChart:     Answer:   Yes    Ferritin     Standing Status:   Future     Standing Expiration Date:   3/12/2026     Order Specific Question:   Send normal result to authorizing provider's In Basket if patient is active on MyChart:     Answer:   Yes    TRANSFERRIN     Standing Status:   Future     Standing Expiration Date:   3/12/2026     Order Specific Question:   Send normal result to authorizing provider's In Basket if patient is active on MyChart:     Answer:   Yes    Vitamin B12     Standing Status:   Future     Standing Expiration Date:   3/12/2026     Order Specific Question:   Send normal result to authorizing provider's In Basket if patient is active on MyChart:     Answer:   Yes    FOLATE     Standing Status:   Future     Standing Expiration Date:   3/12/2026     Order Specific Question:   Send normal result to authorizing provider's In Basket if patient is active on MyChart:     Answer:   Yes       Follow up as scheduled.     She expressed verbal understanding and agreed with the plan    Patient's Medications   New Prescriptions    No medications on file   Previous Medications    ALLOPURINOL (ZYLOPRIM) 300 MG TABLET    TAKE 1 TABLET BY MOUTH EVERY DAY    ASPIRIN 81 MG CHEW    Take 1 tablet (81 mg total) by mouth once daily.    ATORVASTATIN (LIPITOR) 40 MG TABLET    Take 1 tablet (40 mg total) by mouth every evening.    CLOPIDOGREL (PLAVIX) 75 MG TABLET    Take 1 tablet (75 mg total) by mouth once daily.    ESCITALOPRAM OXALATE (LEXAPRO) 5 MG TAB    Take 1 tablet (5 mg total) by mouth once daily.    IBUPROFEN (ADVIL,MOTRIN) 200 MG TABLET    Take 200-800 mg by mouth every 6 (six) hours as needed for Pain.    LOSARTAN (COZAAR) 50 MG TABLET    Take 1 tablet (50 mg total)  by mouth once daily.    METOPROLOL SUCCINATE (TOPROL-XL) 25 MG 24 HR TABLET    Take 1 tablet (25 mg total) by mouth 2 (two) times daily.    NITROGLYCERIN (NITROSTAT) 0.4 MG SL TABLET    Place 1 tablet (0.4 mg total) under the tongue every 5 (five) minutes as needed for Chest pain.    PANTOPRAZOLE (PROTONIX) 40 MG TABLET    Take 1 tablet (40 mg total) by mouth every morning.    SPIRONOLACTONE (ALDACTONE) 50 MG TABLET    Take 1 tablet (50 mg total) by mouth once daily.   Modified Medications    No medications on file   Discontinued Medications    No medications on file        Jorden Fong M.D

## 2025-01-06 DIAGNOSIS — K21.9 GASTROESOPHAGEAL REFLUX DISEASE, UNSPECIFIED WHETHER ESOPHAGITIS PRESENT: ICD-10-CM

## 2025-01-06 RX ORDER — PANTOPRAZOLE SODIUM 40 MG/1
40 TABLET, DELAYED RELEASE ORAL EVERY MORNING
Qty: 90 TABLET | Refills: 0 | Status: SHIPPED | OUTPATIENT
Start: 2025-01-06

## 2025-01-06 NOTE — TELEPHONE ENCOUNTER
Refill Decision Note   José Miguel Meng  is requesting a refill authorization.    Brief Assessment and Rationale for Refill:  Approve       Medication Therapy Plan:  Reviewed acute care/admission visit notes; no drug changes; approved 90+0 to bridge to FOV      Extended chart review required: Yes   Comments:     Note composed:3:35 PM 01/06/2025

## 2025-01-06 NOTE — TELEPHONE ENCOUNTER
No care due was identified.  Health [facility] Embedded Care Due Messages. Reference number: [id number].   1/06/2025 12:48:05 AM CST

## 2025-01-09 ENCOUNTER — PATIENT MESSAGE (OUTPATIENT)
Dept: CARDIOLOGY | Facility: CLINIC | Age: 50
End: 2025-01-09
Payer: MEDICAID

## 2025-01-09 ENCOUNTER — PATIENT MESSAGE (OUTPATIENT)
Dept: INTERNAL MEDICINE | Facility: CLINIC | Age: 50
End: 2025-01-09
Payer: MEDICAID

## 2025-01-14 ENCOUNTER — OFFICE VISIT (OUTPATIENT)
Dept: INTERNAL MEDICINE | Facility: CLINIC | Age: 50
End: 2025-01-14
Payer: MEDICAID

## 2025-01-14 ENCOUNTER — LAB VISIT (OUTPATIENT)
Dept: LAB | Facility: HOSPITAL | Age: 50
End: 2025-01-14
Attending: INTERNAL MEDICINE
Payer: MEDICAID

## 2025-01-14 VITALS
DIASTOLIC BLOOD PRESSURE: 60 MMHG | WEIGHT: 172.38 LBS | SYSTOLIC BLOOD PRESSURE: 102 MMHG | OXYGEN SATURATION: 99 % | HEART RATE: 76 BPM | HEIGHT: 68 IN | BODY MASS INDEX: 26.13 KG/M2

## 2025-01-14 DIAGNOSIS — D64.9 NORMOCYTIC ANEMIA: ICD-10-CM

## 2025-01-14 DIAGNOSIS — E53.8 FOLATE DEFICIENCY: ICD-10-CM

## 2025-01-14 DIAGNOSIS — F43.21 GRIEF REACTION: ICD-10-CM

## 2025-01-14 DIAGNOSIS — I51.81 TAKOTSUBO CARDIOMYOPATHY: ICD-10-CM

## 2025-01-14 DIAGNOSIS — Z00.00 PREVENTATIVE HEALTH CARE: Primary | ICD-10-CM

## 2025-01-14 DIAGNOSIS — E53.8 B12 DEFICIENCY: ICD-10-CM

## 2025-01-14 DIAGNOSIS — Z00.00 PREVENTATIVE HEALTH CARE: ICD-10-CM

## 2025-01-14 DIAGNOSIS — Z23 NEED FOR VACCINATION: ICD-10-CM

## 2025-01-14 LAB
HAPTOGLOB SERPL-MCNC: 136 MG/DL (ref 30–250)
LDH SERPL L TO P-CCNC: 145 U/L (ref 110–260)
RETICS/RBC NFR AUTO: 2.2 % (ref 0.5–2.5)

## 2025-01-14 PROCEDURE — 86880 COOMBS TEST DIRECT: CPT | Performed by: INTERNAL MEDICINE

## 2025-01-14 PROCEDURE — 90471 IMMUNIZATION ADMIN: CPT | Mod: PBBFAC,PO

## 2025-01-14 PROCEDURE — 85045 AUTOMATED RETICULOCYTE COUNT: CPT | Performed by: INTERNAL MEDICINE

## 2025-01-14 PROCEDURE — 86735 MUMPS ANTIBODY: CPT | Performed by: INTERNAL MEDICINE

## 2025-01-14 PROCEDURE — 83010 ASSAY OF HAPTOGLOBIN QUANT: CPT | Performed by: INTERNAL MEDICINE

## 2025-01-14 PROCEDURE — 3008F BODY MASS INDEX DOCD: CPT | Mod: CPTII,,, | Performed by: INTERNAL MEDICINE

## 2025-01-14 PROCEDURE — 1159F MED LIST DOCD IN RCRD: CPT | Mod: CPTII,,, | Performed by: INTERNAL MEDICINE

## 2025-01-14 PROCEDURE — 1160F RVW MEDS BY RX/DR IN RCRD: CPT | Mod: CPTII,,, | Performed by: INTERNAL MEDICINE

## 2025-01-14 PROCEDURE — 99999 PR PBB SHADOW E&M-EST. PATIENT-LVL IV: CPT | Mod: PBBFAC,,, | Performed by: INTERNAL MEDICINE

## 2025-01-14 PROCEDURE — 83615 LACTATE (LD) (LDH) ENZYME: CPT | Performed by: INTERNAL MEDICINE

## 2025-01-14 PROCEDURE — 90472 IMMUNIZATION ADMIN EACH ADD: CPT | Mod: PBBFAC,PO

## 2025-01-14 PROCEDURE — 86787 VARICELLA-ZOSTER ANTIBODY: CPT | Performed by: INTERNAL MEDICINE

## 2025-01-14 PROCEDURE — 99396 PREV VISIT EST AGE 40-64: CPT | Mod: 25,S$PBB,, | Performed by: INTERNAL MEDICINE

## 2025-01-14 PROCEDURE — 4010F ACE/ARB THERAPY RXD/TAKEN: CPT | Mod: CPTII,,, | Performed by: INTERNAL MEDICINE

## 2025-01-14 PROCEDURE — 99999PBSHW PR PBB SHADOW TECHNICAL ONLY FILED TO HB: Mod: PBBFAC,,,

## 2025-01-14 PROCEDURE — 3074F SYST BP LT 130 MM HG: CPT | Mod: CPTII,,, | Performed by: INTERNAL MEDICINE

## 2025-01-14 PROCEDURE — 86765 RUBEOLA ANTIBODY: CPT | Performed by: INTERNAL MEDICINE

## 2025-01-14 PROCEDURE — 86480 TB TEST CELL IMMUN MEASURE: CPT | Performed by: INTERNAL MEDICINE

## 2025-01-14 PROCEDURE — 90636 HEP A/HEP B VACC ADULT IM: CPT | Mod: PBBFAC,PO

## 2025-01-14 PROCEDURE — 86762 RUBELLA ANTIBODY: CPT | Performed by: INTERNAL MEDICINE

## 2025-01-14 PROCEDURE — 3078F DIAST BP <80 MM HG: CPT | Mod: CPTII,,, | Performed by: INTERNAL MEDICINE

## 2025-01-14 PROCEDURE — 90656 IIV3 VACC NO PRSV 0.5 ML IM: CPT | Mod: PBBFAC,PO

## 2025-01-14 PROCEDURE — 99214 OFFICE O/P EST MOD 30 MIN: CPT | Mod: PBBFAC,25,PO | Performed by: INTERNAL MEDICINE

## 2025-01-14 RX ORDER — VIT B12/INTRINSIC FACT/FOLATE 500-20-800
1 TABLET ORAL DAILY
Start: 2025-01-14

## 2025-01-14 RX ADMIN — INFLUENZA VIRUS VACCINE 0.5 ML: 15; 15; 15 SUSPENSION INTRAMUSCULAR at 11:01

## 2025-01-14 RX ADMIN — HEPATITIS B VACCINE (RECOMBINANT) 0.5 ML: 20 INJECTION, SUSPENSION INTRAMUSCULAR at 11:01

## 2025-01-14 NOTE — PATIENT INSTRUCTIONS
1. Individual Therapy   A. [email or web address]  B. [email or web address]  ISI.  [facility] [phone]    2. Group therapy   A. [email or web address]    3. Meditation  A.  apps including Calm, Headspace and insight timer

## 2025-01-14 NOTE — PROGRESS NOTES
Hi, patient reporting some presyncopal episodes, (albeit since 2023) when I looked back her BP numbers seemed to drop at that time 90-120s. I know there was some thought that his could be JENNIFER related but her iron and ferritin are normal. ( I'm asking her to replete her b12/folate with supplement) . But might we consider lowering her BP meds and having more freqent checks with dig med for BP. Thoughts?    Assessment:         1. Preventative health care    2. Need for vaccination    3. Folate deficiency    4. B12 deficiency    5. Normocytic anemia    6. Takotsubo cardiomyopathy    7. Grief reaction          Plan:           Treniece was seen today for follow-up.    Diagnoses and all orders for this visit:    Preventative health care  -     Quantiferon Gold TB; Future  -     Mumps, IgG Screen; Future  -     Rubella Antibody, IgG; Future  -     Rubeola Antibody IgG; Future  -     Varicella Zoster Antibody, IgG; Future    Need for vaccination  -     influenza (Flulaval, Fluzone, Fluarix) 45 mcg/0.5 mL IM vaccine (> or = 6 mo) 0.5 mL  -     hepatitis B virus (PF) vaccine injection 0.5 mL    Folate deficiency  -     vit A72-vggdkrm fact-FA cmb #2 (INTRINSI B12-FOLATE) 500- mcg-mg-mcg Tab; Take 1 tablet by mouth once daily.  -     CBC Auto Differential; Standing  -     Folate; Standing  -     Vitamin B12; Standing  -     Reticulocytes; Standing  -     Haptoglobin; Future  -     Lactate Dehydrogenase; Future  -     Reticulocytes; Future  -     Direct antiglobulin test; Future    B12 deficiency  -     vit B90-ndsxoek fact-FA cmb #2 (INTRINSI B12-FOLATE) 500- mcg-mg-mcg Tab; Take 1 tablet by mouth once daily.  -     CBC Auto Differential; Standing  -     Folate; Standing  -     Vitamin B12; Standing  -     Reticulocytes; Standing  -     Haptoglobin; Future  -     Lactate Dehydrogenase; Future  -     Reticulocytes; Future  -     Direct antiglobulin test; Future    Normocytic anemia  -     CBC Auto Differential;  Standing  -     Folate; Standing  -     Vitamin B12; Standing  -     Reticulocytes; Standing  -     Direct antiglobulin test; Future    Takotsubo cardiomyopathy    Grief reaction  -     MYC E-Visit        Assessment & Plan    I51.81 Takotsubo syndrome  F41.9 Anxiety disorder, unspecified  F32.A Depression, unspecified  E53.8 Deficiency of other specified B group vitamins  N95.1 Menopausal and female climacteric states  I10 Essential (primary) hypertension  Z90.710 Acquired absence of both cervix and uterus  Z63.4 Disappearance and death of family member  H93.8X9 Other specified disorders of ear, unspecified ear    IMPRESSION:  Reviewed recent Takotsubo cardiomyopathy diagnosis and follow-up with cardiology  Considered anemia as potential cause for fatigue and dizziness; lab results showed normal iron levels but low B12 and folate  Evaluated blood pressure readings from digital medicine program; noted consistently low readings, potentially contributing to pre-syncopal episodes  Assessed ear discomfort post-travel; noted fluid behind eardrum without signs of infection  Considered adjusting antihypertensive medications, particularly aldactone, due to low blood pressure readings  Planned to consult with digital team and cardiologist regarding potential medication adjustments  Evaluated patient's hesitancy to start Lexapro due to concerns about side effects, particularly palpitations    PATIENT EDUCATION:  - Explained that palpitations are an uncommon side effect of Lexapro (1-5% occurrence rate)  - Discussed more common side effects of Lexapro: upset stomach, loose stools, headaches, and potential sexual side effects  - Explained that ear fluid will likely drain on its own    ACTION ITEMS/LIFESTYLE:  - Patient to take OTC B12 and folate supplements daily for 3 months  - Patient can consider taking Claritin to help dry up ear fluid if it persists    MEDICATIONS:  - Started B12 supplement: Take 1 tablet daily for 3  months  - Started folate supplement: Take 1 tablet daily for 3 months  - Started Lexapro    ORDERS:  - Blood work for school form titers (MMRV) ordered  - Tuberculosis blood test ordered  - Administered 1st dose of Hepatitis B vaccine  - Administered flu vaccine    REFERRALS:  - Luis Aayah to send options for grief counseling referrals    FOLLOW UP:  - Follow up via e-visit questionnaire in a few weeks to assess Lexapro effectiveness and symptoms  - Follow up to  completed school form once all results are back  - Contact the office if ear discomfort persists or worsens             Subjective:       Patient ID: José Miguel Meng is a 49 y.o. female.    Chief Complaint: Follow-up      Interim Hx  Concerns today  Chronic problems        History of Present Illness    CHIEF COMPLAINT:  Patient presents today for follow-up of takotsubo cardiomyopathy.    CARDIOVASCULAR:  She was recently hospitalized for takotsubo cardiomyopathy and is currently under cardiology care. She reports pre-syncope episodes since 2023, associated with palpitations. Her blood pressure readings have been low, ranging from 108-124 mmHg systolic, which she monitors weekly using digital medicine.    ANXIETY AND DEPRESSION:  She experiences anxiety and depression with associated palpitations. Though Lexapro was prescribed, she has not initiated treatment due to concerns about side effects, particularly potential rapid heartbeat. She expresses hesitation about starting the medication without additional guidance.    CONSTITUTIONAL SYMPTOMS:  She reports fatigue, lightheadedness, and dizziness. Lab work showed low B12 and folate levels, with cardiologist suggesting possible iron deficiency as an underlying cause.    ENT:  She reports new onset ear discomfort following air travel to [place] 3 weeks ago, with intermittent sensation of ear fullness and feeling of water in ears.    MENOPAUSAL SYMPTOMS:  She has a history of hysterectomy and  "experiences occasional mild hot flashes for the past couple years.    LABS:  Recent labs show elevated triglycerides despite medication adherence and dietary improvements.    IMMUNIZATIONS:  She is current on Tdap and flu vaccinations, has received first dose of Hepatitis B vaccine.      ROS:  General: +fatigue  Cardiovascular: +palpitations  Neurological: +dizziness  Psychiatric: +anxiety, +depression          Last 5 Patient Entered Readings                Current 30 Day Average: 116/78  Recent Readings 1/9/2025 12/28/2024 12/5/2024 11/28/2024 11/28/2024   SBP (mmHg) 108 124 117 104 111   DBP (mmHg) 72 83 78 63 75   Pulse 77 67 84 81 70                   Review of Systems        Health Maintenance Due   Topic Date Due    COVID-19 Vaccine (1 - 2024-25 season) Never done         Objective:     /60 (BP Location: Right arm, Patient Position: Sitting)   Pulse 76   Ht 5' 8" (1.727 m)   Wt 78.2 kg (172 lb 6.4 oz)   LMP 04/07/2016 (Exact Date)   SpO2 99%   BMI 26.21 kg/m²   .Physical Exam    General: No acute distress. Well-developed. Well-nourished.  Respiratory: Normal respiratory effort. Clear to auscultation bilaterally. No rales. No rhonchi. No wheezing.  Neurological: Alert & oriented x3.               1/14/2025    10:34 AM 12/12/2024    10:01 AM 12/7/2024    12:15 PM 12/6/2024    11:14 PM 12/6/2024     9:43 PM   Vitals   Height 5' 8" (1.727 m) 5' 8" (1.727 m) 5' 8" (1.727 m) 5' 8" (1.727 m)    Weight (lbs) 172.4 173.5 169  169.53   BMI (kg/m2) 26.2 26.4 25.7  25.8          Physical Exam      Recent Results (from the past 2 weeks)   Iron and TIBC    Collection Time: 01/13/25 10:11 AM   Result Value Ref Range    Iron 125 30 - 160 ug/dL    Transferrin 194 (L) 200 - 375 mg/dL    TIBC 287 250 - 450 ug/dL    Saturated Iron 44 20 - 50 %   Ferritin    Collection Time: 01/13/25 10:11 AM   Result Value Ref Range    Ferritin 476 (H) 20.0 - 300.0 ng/mL   TRANSFERRIN    Collection Time: 01/13/25 10:11 AM   Result " Value Ref Range    Transferrin 194 (L) 200 - 375 mg/dL   Vitamin B12    Collection Time: 01/13/25 10:11 AM   Result Value Ref Range    Vitamin B-12 383 210 - 950 pg/mL   FOLATE    Collection Time: 01/13/25 10:11 AM   Result Value Ref Range    Folate <2.2 (L) 4.0 - 24.0 ng/mL   Haptoglobin    Collection Time: 01/14/25 11:58 AM   Result Value Ref Range    Haptoglobin 136 30 - 250 mg/dL   Lactate Dehydrogenase    Collection Time: 01/14/25 11:58 AM   Result Value Ref Range     110 - 260 U/L   Reticulocytes    Collection Time: 01/14/25 11:58 AM   Result Value Ref Range    Retic 2.2 0.5 - 2.5 %   Direct antiglobulin test    Collection Time: 01/14/25 11:58 AM   Result Value Ref Range    Direct Bryan (DUTCH) NEG          Future Appointments   Date Time Provider Department Center   1/28/2025 To Be Determined [facility] E-VISIT [facility] None   4/11/2025 10:00 AM LAB, [facility] [place]   4/15/2025 11:00 AM Steve Skelton III, MD [facility]         Medication List with Changes/Refills   New Medications    VIT [id number] FACT-FA CMB #2 (INTRINSI B12-FOLATE) 500- MCG-MG-MCG TAB    Take 1 tablet by mouth once daily.   Current Medications    ALLOPURINOL (ZYLOPRIM) 300 MG TABLET    TAKE 1 TABLET BY MOUTH EVERY DAY    ASPIRIN 81 MG CHEW    Take 1 tablet (81 mg total) by mouth once daily.    ATORVASTATIN (LIPITOR) 40 MG TABLET    Take 1 tablet (40 mg total) by mouth every evening.    CLOPIDOGREL (PLAVIX) 75 MG TABLET    Take 1 tablet (75 mg total) by mouth once daily.    ESCITALOPRAM OXALATE (LEXAPRO) 5 MG TAB    Take 1 tablet (5 mg total) by mouth once daily.    IBUPROFEN (ADVIL,MOTRIN) 200 MG TABLET    Take 200-800 mg by mouth every 6 (six) hours as needed for Pain.    LOSARTAN (COZAAR) 50 MG TABLET    Take 1 tablet (50 mg total) by mouth once daily.    METOPROLOL SUCCINATE (TOPROL-XL) 25 MG 24 HR TABLET    Take 1 tablet (25 mg total) by mouth 2 (two) times daily.    NITROGLYCERIN (NITROSTAT) 0.4 MG  SL TABLET    Place 1 tablet (0.4 mg total) under the tongue every 5 (five) minutes as needed for Chest pain.    PANTOPRAZOLE (PROTONIX) 40 MG TABLET    Take 1 tablet (40 mg total) by mouth every morning.    SPIRONOLACTONE (ALDACTONE) 50 MG TABLET    Take 1 tablet (50 mg total) by mouth once daily.         Disclaimer:  This note has been generated using voice-recognition software. There may be grammatical or spelling errors that have been missed during proof-reading   This note was generated with the assistance of ambient listening technology. Verbal consent was obtained by the patient and accompanying visitor(s) for the recording of patient appointment to facilitate this note. I attest to having reviewed and edited the generated note for accuracy, though some syntax or spelling errors may persist. Please contact the author of this note for any clarification.       Vitals - 1 value per visit     SYSTOLIC DIASTOLIC   7/5/2013 187 !  136 (H)    4/12/2016 142 !  93 (H)    12/20/2018 153 !  104 (H)    1/24/2019 2/4/2019 146 !  98 (H)    2/15/2019 121  82    2/18/2019 143 !  96 (H)    2/25/2019 135  85    3/11/2019 140 !  95 (H)    5/7/2019     6/10/2019 138 !  92 (H)    3/12/2021 132  84    3/19/2021     4/23/2021 130 !  90 !    5/30/2022 125  80    6/27/2022 130  88    2/7/2023 154 !  100 (H)    2/23/2023 142 !  106 (H)    3/3/2023     4/11/2023 111  72    4/17/2023 142 !  85 !    4/25/2023     5/10/2023 135 !  93 (H)    5/11/2023 130 !  94 (H)    5/31/2023 110  80    6/12/2023 120  78    7/6/2023 8/28/2023 120  70    8/31/2023 122  82    12/5/2023 120  84    6/5/2024 104  66    7/8/2024 11/9/2024 121  84     115  83    11/18/2024 98  70    12/6/2024 130  78    12/12/2024 93  64    1/14/2025 102  60

## 2025-01-14 NOTE — Clinical Note
Hi, patient reporting some presyncopal episodes, (albeit since 2023) when I looked back her BP numbers seemed to drop at that time 90-120s. I know there was some thought that his could be JENNIFER related but her iron and ferritin are normal. ( I'm asking her to replete her b12/folate with supplement) . But might we consider lowering her BP meds and having more freqent checks with dig med for BP. Thoughts?

## 2025-01-15 LAB
DAT IGG-SP REAG RBC-IMP: NORMAL
GAMMA INTERFERON BACKGROUND BLD IA-ACNC: 0.08 IU/ML
M TB IFN-G CD4+ BCKGRND COR BLD-ACNC: 0.02 IU/ML
M TB IFN-G CD4+ BCKGRND COR BLD-ACNC: 0.04 IU/ML
MITOGEN IGNF BCKGRD COR BLD-ACNC: 7.76 IU/ML
MUMPS IGG INTERPRETATION: NEGATIVE
MUMPS IGG SCREEN: <5 AU/ML
RUBEOLA IGG ANTIBODY: 154 AU/ML
RUBEOLA INTERPRETATION: POSITIVE
RUBV IGG SER-ACNC: 20.2 IU/ML
RUBV IGG SER-IMP: REACTIVE
TB GOLD PLUS: NEGATIVE
VARICELLA INTERPRETATION: POSITIVE
VARICELLA ZOSTER IGG: 23.4 S/CO

## 2025-01-18 NOTE — PROGRESS NOTES
Hi  Her bloodwork shows that she needs a MMR vaccines. She is immune to varicella so she doesn't need the varicella. Can you get her scheduled on inj yoli. thanks

## 2025-01-22 ENCOUNTER — TELEPHONE (OUTPATIENT)
Dept: FAMILY MEDICINE | Facility: CLINIC | Age: 50
End: 2025-01-22
Payer: MEDICAID

## 2025-01-22 NOTE — TELEPHONE ENCOUNTER
----- Message from Pharmacist Radha sent at 1/22/2025  8:48 AM CST -----  Hi Dr Skelton    I have been trying to reach Treniece about the medication changes we discussed, but am without success. Will continue to reach out to her. If she has follow up with soon you please let her know that I am trying to reach her. I have attached my number below in case she doesn't answer calls from numbers she does not know.    Thanks    Radha Padilla, PharmD, BCACP  Clinical Pharmacist  Digital Medicine   [phone]

## 2025-01-27 ENCOUNTER — TELEPHONE (OUTPATIENT)
Dept: INTERNAL MEDICINE | Facility: CLINIC | Age: 50
End: 2025-01-27
Payer: MEDICAID

## 2025-01-27 NOTE — TELEPHONE ENCOUNTER
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 Pt agreed to Thursday at 1040

## 2025-01-27 NOTE — TELEPHONE ENCOUNTER
----- Message from Steve Skelton MD sent at 1/18/2025  5:29 AM CST -----  Hi  Her bloodwork shows that she needs a MMR vaccines. She is immune to varicella so she doesn't need the varicella. Can you get her scheduled on inj scheudle. thanks

## 2025-01-30 ENCOUNTER — LAB VISIT (OUTPATIENT)
Dept: LAB | Facility: HOSPITAL | Age: 50
End: 2025-01-30
Attending: INTERNAL MEDICINE
Payer: MEDICAID

## 2025-01-30 ENCOUNTER — CLINICAL SUPPORT (OUTPATIENT)
Dept: FAMILY MEDICINE | Facility: CLINIC | Age: 50
End: 2025-01-30
Payer: MEDICAID

## 2025-01-30 DIAGNOSIS — I10 ESSENTIAL HYPERTENSION: ICD-10-CM

## 2025-01-30 DIAGNOSIS — M1A.49X0 OTHER SECONDARY CHRONIC GOUT OF MULTIPLE SITES WITHOUT TOPHUS: ICD-10-CM

## 2025-01-30 DIAGNOSIS — Z23 NEED FOR VACCINATION: Primary | ICD-10-CM

## 2025-01-30 LAB
ALBUMIN SERPL BCP-MCNC: 3.7 G/DL (ref 3.5–5.2)
ALP SERPL-CCNC: 59 U/L (ref 40–150)
ALT SERPL W/O P-5'-P-CCNC: 25 U/L (ref 10–44)
ANION GAP SERPL CALC-SCNC: 8 MMOL/L (ref 8–16)
AST SERPL-CCNC: 60 U/L (ref 10–40)
BILIRUB SERPL-MCNC: 0.2 MG/DL (ref 0.1–1)
BUN SERPL-MCNC: 17 MG/DL (ref 6–20)
CALCIUM SERPL-MCNC: 9 MG/DL (ref 8.7–10.5)
CHLORIDE SERPL-SCNC: 104 MMOL/L (ref 95–110)
CO2 SERPL-SCNC: 20 MMOL/L (ref 23–29)
CREAT SERPL-MCNC: 1.1 MG/DL (ref 0.5–1.4)
EST. GFR  (NO RACE VARIABLE): >60 ML/MIN/1.73 M^2
GLUCOSE SERPL-MCNC: 80 MG/DL (ref 70–110)
POTASSIUM SERPL-SCNC: 4.6 MMOL/L (ref 3.5–5.1)
PROT SERPL-MCNC: 7.4 G/DL (ref 6–8.4)
SODIUM SERPL-SCNC: 132 MMOL/L (ref 136–145)

## 2025-01-30 PROCEDURE — 90707 MMR VACCINE SC: CPT | Mod: PBBFAC,PO

## 2025-01-30 PROCEDURE — 99999 PR PBB SHADOW E&M-EST. PATIENT-LVL II: CPT | Mod: PBBFAC,,,

## 2025-01-30 PROCEDURE — 36415 COLL VENOUS BLD VENIPUNCTURE: CPT | Mod: PO | Performed by: INTERNAL MEDICINE

## 2025-01-30 PROCEDURE — 99999PBSHW PR PBB SHADOW TECHNICAL ONLY FILED TO HB: Mod: PBBFAC,,,

## 2025-01-30 PROCEDURE — 80053 COMPREHEN METABOLIC PANEL: CPT | Performed by: INTERNAL MEDICINE

## 2025-01-30 PROCEDURE — 90471 IMMUNIZATION ADMIN: CPT | Mod: PBBFAC,PO

## 2025-01-30 RX ADMIN — MEASLES, MUMPS, AND RUBELLA VIRUS VACCINE LIVE 0.5 ML: 1000; 12500; 1000 INJECTION, POWDER, LYOPHILIZED, FOR SUSPENSION SUBCUTANEOUS at 10:01

## 2025-01-30 NOTE — PROGRESS NOTES
After obtaining consent, and per orders of Dr. Burleson, injection of MMR given by Lucy Farley. Patient instructed to remain in clinic for 15 minutes afterwards, and to report any adverse reaction to me immediately. Pt received and tolerated vaccine in the left arm

## 2025-01-31 RX ORDER — ALLOPURINOL 300 MG/1
300 TABLET ORAL DAILY
Qty: 90 TABLET | Refills: 1 | Status: SHIPPED | OUTPATIENT
Start: 2025-01-31

## 2025-01-31 NOTE — TELEPHONE ENCOUNTER
Refill Routing Note   Medication(s) are not appropriate for processing by [facility] for the following reason(s):        No active prescription written by provider    [facility] action(s):  Defer               Appointments  past 12m or future 3m with PCP    Date Provider   Last Visit   1/14/2025 Steve Skelton III, MD   Next Visit   4/15/2025 Steve Skelton III, MD   ED visits in past 90 days: 1        Note composed:8:01 AM 01/31/2025

## 2025-01-31 NOTE — PROGRESS NOTES
Hi Imi    Seems like plan is to stop the aldactone, are you planning on checking another cmp in a week ?. Just let me know b/c her AST was slightly elevated so I would want to see the repeat thanks. Could be the statin she started after her hospitalization for heart failure

## 2025-01-31 NOTE — TELEPHONE ENCOUNTER
No care due was identified.  [facility] Embedded Care Due Messages. Reference number: [id number].   1/30/2025 7:43:15 PM CST

## 2025-02-13 ENCOUNTER — PATIENT MESSAGE (OUTPATIENT)
Dept: INTERNAL MEDICINE | Facility: CLINIC | Age: 50
End: 2025-02-13
Payer: MEDICAID

## 2025-02-26 ENCOUNTER — TELEPHONE (OUTPATIENT)
Dept: INTERNAL MEDICINE | Facility: CLINIC | Age: 50
End: 2025-02-26
Payer: MEDICAID

## 2025-02-27 ENCOUNTER — RESULTS FOLLOW-UP (OUTPATIENT)
Dept: INTERNAL MEDICINE | Facility: CLINIC | Age: 50
End: 2025-02-27

## 2025-02-27 ENCOUNTER — PATIENT MESSAGE (OUTPATIENT)
Dept: INTERNAL MEDICINE | Facility: CLINIC | Age: 50
End: 2025-02-27
Payer: MEDICAID

## 2025-02-27 DIAGNOSIS — K76.0 FATTY LIVER: ICD-10-CM

## 2025-02-27 DIAGNOSIS — K80.20 CALCULUS OF GALLBLADDER WITHOUT CHOLECYSTITIS WITHOUT OBSTRUCTION: Primary | ICD-10-CM

## 2025-02-27 NOTE — TELEPHONE ENCOUNTER
----- Message from Pharmacist Radha sent at 2/22/2025 12:32 AM CST -----  Good morningTreniece is complaining of increased swelling in her extremities since discontinuing spironolactone. I discussed sodium and water intake with her to see if she could manage without restarting any diuretics. Her BP was also creeping up so I increased losartan on Friday and I will be following up with her tomorrow. I plan to discuss other non pharmacological ways to manage edema like compression socks and elevating her feet but wanted to check if you have other ideas to help her manage the swelling. Please advise.Radha Padilla, PharmD, BCACPClinical Pharmacist  Digital Medicine [phone]

## 2025-03-17 PROBLEM — K80.20 CALCULUS OF GALLBLADDER WITHOUT CHOLECYSTITIS WITHOUT OBSTRUCTION: Status: ACTIVE | Noted: 2025-03-17

## 2025-03-17 PROBLEM — K76.0 FATTY LIVER: Status: ACTIVE | Noted: 2025-03-17

## 2025-04-07 DIAGNOSIS — K21.9 GASTROESOPHAGEAL REFLUX DISEASE, UNSPECIFIED WHETHER ESOPHAGITIS PRESENT: ICD-10-CM

## 2025-04-07 RX ORDER — PANTOPRAZOLE SODIUM 40 MG/1
40 TABLET, DELAYED RELEASE ORAL EVERY MORNING
Qty: 90 TABLET | Refills: 3 | Status: SHIPPED | OUTPATIENT
Start: 2025-04-07

## 2025-04-07 NOTE — TELEPHONE ENCOUNTER
Care Due:                  Date            Visit Type   Department     Provider  --------------------------------------------------------------------------------                                EP -                              PRIMARY      [facility] INTERNAL  Last Visit: 01-      CARE ([facility])   MEDICINE       Steve Skelton                              [facility]                              PRIMARY      [facility] INTERNAL  Next Visit: 04-      CARE ([facility])   MEDICINE       Steve Skelton                                                            Last  Test          Frequency    Reason                     Performed    Due Date  --------------------------------------------------------------------------------    Uric Acid...  12 months..  allopurinoL..............  06- 05-    [facility] Embedded Care Due Messages. Reference number: [id number].   4/07/2025 10:31:10 AM CDT

## 2025-04-08 NOTE — TELEPHONE ENCOUNTER
Provider Staff:  Action required for this patient     Please see care gap opportunities below in Care Due Message.    Thanks!  [facility]     Appointments      Date Provider   Last Visit   1/14/2025 Steve Skelton III, MD   Next Visit   4/15/2025 Steve Skelton III, MD      Refill Decision Note   José Miguel Meng  is requesting a refill authorization.  Brief Assessment and Rationale for Refill:  Approve     Medication Therapy Plan:         Comments:     Note composed:9:57 PM 04/07/2025

## 2025-04-11 ENCOUNTER — LAB VISIT (OUTPATIENT)
Dept: LAB | Facility: HOSPITAL | Age: 50
End: 2025-04-11
Attending: INTERNAL MEDICINE
Payer: MEDICAID

## 2025-04-11 DIAGNOSIS — E53.8 B12 DEFICIENCY: ICD-10-CM

## 2025-04-11 DIAGNOSIS — D64.9 NORMOCYTIC ANEMIA: ICD-10-CM

## 2025-04-11 DIAGNOSIS — E53.8 FOLATE DEFICIENCY: ICD-10-CM

## 2025-04-11 LAB
ABSOLUTE EOSINOPHIL (OHS): 0.14 K/UL
ABSOLUTE MONOCYTE (OHS): 0.26 K/UL (ref 0.3–1)
ABSOLUTE NEUTROPHIL COUNT (OHS): 2.06 K/UL (ref 1.8–7.7)
BASOPHILS # BLD AUTO: 0.04 K/UL
BASOPHILS NFR BLD AUTO: 0.9 %
ERYTHROCYTE [DISTWIDTH] IN BLOOD BY AUTOMATED COUNT: 14.2 % (ref 11.5–14.5)
FOLATE SERPL-MCNC: 4.2 NG/ML (ref 4–24)
HCT VFR BLD AUTO: 33.8 % (ref 37–48.5)
HGB BLD-MCNC: 11 GM/DL (ref 12–16)
IMM GRANULOCYTES # BLD AUTO: 0.01 K/UL (ref 0–0.04)
IMM GRANULOCYTES NFR BLD AUTO: 0.2 % (ref 0–0.5)
LYMPHOCYTES # BLD AUTO: 2.01 K/UL (ref 1–4.8)
MCH RBC QN AUTO: 33.3 PG (ref 27–31)
MCHC RBC AUTO-ENTMCNC: 32.5 G/DL (ref 32–36)
MCV RBC AUTO: 102 FL (ref 82–98)
NUCLEATED RBC (/100WBC) (OHS): 0 /100 WBC
PLATELET # BLD AUTO: 216 K/UL (ref 150–450)
PMV BLD AUTO: 10.7 FL (ref 9.2–12.9)
RBC # BLD AUTO: 3.3 M/UL (ref 4–5.4)
RELATIVE EOSINOPHIL (OHS): 3.1 %
RELATIVE LYMPHOCYTE (OHS): 44.5 % (ref 18–48)
RELATIVE MONOCYTE (OHS): 5.8 % (ref 4–15)
RELATIVE NEUTROPHIL (OHS): 45.5 % (ref 38–73)
RETICS/RBC NFR AUTO: 2.1 % (ref 0.5–2.5)
VIT B12 SERPL-MCNC: 668 PG/ML (ref 210–950)
WBC # BLD AUTO: 4.52 K/UL (ref 3.9–12.7)

## 2025-04-11 PROCEDURE — 36415 COLL VENOUS BLD VENIPUNCTURE: CPT | Mod: PO

## 2025-04-11 PROCEDURE — 82607 VITAMIN B-12: CPT

## 2025-04-11 PROCEDURE — 85045 AUTOMATED RETICULOCYTE COUNT: CPT

## 2025-04-11 PROCEDURE — 85025 COMPLETE CBC W/AUTO DIFF WBC: CPT

## 2025-04-11 PROCEDURE — 82746 ASSAY OF FOLIC ACID SERUM: CPT

## 2025-04-15 ENCOUNTER — OFFICE VISIT (OUTPATIENT)
Dept: INTERNAL MEDICINE | Facility: CLINIC | Age: 50
End: 2025-04-15
Payer: MEDICAID

## 2025-04-15 VITALS
WEIGHT: 177.25 LBS | HEART RATE: 80 BPM | HEIGHT: 68 IN | DIASTOLIC BLOOD PRESSURE: 82 MMHG | OXYGEN SATURATION: 99 % | SYSTOLIC BLOOD PRESSURE: 120 MMHG | BODY MASS INDEX: 26.86 KG/M2

## 2025-04-15 DIAGNOSIS — I51.81 TAKOTSUBO CARDIOMYOPATHY: ICD-10-CM

## 2025-04-15 DIAGNOSIS — M1A.49X0 OTHER SECONDARY CHRONIC GOUT OF MULTIPLE SITES WITHOUT TOPHUS: ICD-10-CM

## 2025-04-15 DIAGNOSIS — F33.0 MILD EPISODE OF RECURRENT MAJOR DEPRESSIVE DISORDER: ICD-10-CM

## 2025-04-15 DIAGNOSIS — K80.20 CALCULUS OF GALLBLADDER WITHOUT CHOLECYSTITIS WITHOUT OBSTRUCTION: ICD-10-CM

## 2025-04-15 DIAGNOSIS — R73.03 PREDIABETES: ICD-10-CM

## 2025-04-15 DIAGNOSIS — Z12.31 ENCOUNTER FOR SCREENING MAMMOGRAM FOR BREAST CANCER: ICD-10-CM

## 2025-04-15 DIAGNOSIS — I10 ESSENTIAL HYPERTENSION: Primary | ICD-10-CM

## 2025-04-15 DIAGNOSIS — D64.9 NORMOCYTIC ANEMIA: ICD-10-CM

## 2025-04-15 DIAGNOSIS — Z23 NEED FOR VACCINATION: ICD-10-CM

## 2025-04-15 DIAGNOSIS — K76.0 FATTY LIVER: ICD-10-CM

## 2025-04-15 DIAGNOSIS — E78.00 PURE HYPERCHOLESTEROLEMIA: ICD-10-CM

## 2025-04-15 PROCEDURE — 3079F DIAST BP 80-89 MM HG: CPT | Mod: CPTII,,, | Performed by: INTERNAL MEDICINE

## 2025-04-15 PROCEDURE — 99215 OFFICE O/P EST HI 40 MIN: CPT | Mod: PBBFAC,PO | Performed by: INTERNAL MEDICINE

## 2025-04-15 PROCEDURE — 90471 IMMUNIZATION ADMIN: CPT | Mod: PBBFAC,PO

## 2025-04-15 PROCEDURE — G2211 COMPLEX E/M VISIT ADD ON: HCPCS | Mod: S$PBB,,, | Performed by: INTERNAL MEDICINE

## 2025-04-15 PROCEDURE — 90677 PCV20 VACCINE IM: CPT | Mod: PBBFAC,PO

## 2025-04-15 PROCEDURE — 1159F MED LIST DOCD IN RCRD: CPT | Mod: CPTII,,, | Performed by: INTERNAL MEDICINE

## 2025-04-15 PROCEDURE — 3074F SYST BP LT 130 MM HG: CPT | Mod: CPTII,,, | Performed by: INTERNAL MEDICINE

## 2025-04-15 PROCEDURE — 99214 OFFICE O/P EST MOD 30 MIN: CPT | Mod: S$PBB,,, | Performed by: INTERNAL MEDICINE

## 2025-04-15 PROCEDURE — 90707 MMR VACCINE SC: CPT | Mod: PBBFAC,PO

## 2025-04-15 PROCEDURE — 3008F BODY MASS INDEX DOCD: CPT | Mod: CPTII,,, | Performed by: INTERNAL MEDICINE

## 2025-04-15 PROCEDURE — 90746 HEPB VACCINE 3 DOSE ADULT IM: CPT | Mod: PBBFAC,PO

## 2025-04-15 PROCEDURE — 1160F RVW MEDS BY RX/DR IN RCRD: CPT | Mod: CPTII,,, | Performed by: INTERNAL MEDICINE

## 2025-04-15 PROCEDURE — 90472 IMMUNIZATION ADMIN EACH ADD: CPT | Mod: PBBFAC,PO

## 2025-04-15 PROCEDURE — 4010F ACE/ARB THERAPY RXD/TAKEN: CPT | Mod: CPTII,,, | Performed by: INTERNAL MEDICINE

## 2025-04-15 PROCEDURE — 99999PBSHW PR PBB SHADOW TECHNICAL ONLY FILED TO HB: Mod: PBBFAC,,,

## 2025-04-15 PROCEDURE — 99999 PR PBB SHADOW E&M-EST. PATIENT-LVL V: CPT | Mod: PBBFAC,,, | Performed by: INTERNAL MEDICINE

## 2025-04-15 RX ADMIN — HEPATITIS B VACCINE (RECOMBINANT) 20 MCG: 20 INJECTION, SUSPENSION INTRAMUSCULAR at 11:04

## 2025-04-15 RX ADMIN — PNEUMOCOCCAL 20-VALENT CONJUGATE VACCINE 0.5 ML
2.2; 2.2; 2.2; 2.2; 2.2; 2.2; 2.2; 2.2; 2.2; 2.2; 2.2; 2.2; 2.2; 2.2; 2.2; 2.2; 4.4; 2.2; 2.2; 2.2 INJECTION, SUSPENSION INTRAMUSCULAR at 11:04

## 2025-04-15 RX ADMIN — MEASLES, MUMPS, AND RUBELLA VIRUS VACCINE LIVE 0.5 ML: 1000; 12500; 1000 INJECTION, POWDER, LYOPHILIZED, FOR SUSPENSION SUBCUTANEOUS at 11:04

## 2025-04-15 NOTE — PATIENT INSTRUCTIONS
1. Individual Therapy   A. [email or web address][employer][email or web address]  B. Paper[employer]li.[email or web address]  ISI.  [facility] [phone]    2. Group therapy   A. [email or web address]    3. Meditation  A.  apps including Calm, Headspace and insight timer

## 2025-04-15 NOTE — PROGRESS NOTES
Assessment:         1. Essential hypertension    2. Encounter for screening mammogram for breast cancer    3. Normocytic anemia    4. Prediabetes    5. Pure hypercholesterolemia    6. Takotsubo cardiomyopathy    7. Calculus of gallbladder without cholecystitis without obstruction    8. Fatty liver    9. Other secondary chronic gout of multiple sites without tophus    10. Need for vaccination    11. Mild episode of recurrent major depressive disorder          Plan:           José Miguel was seen today for follow-up.    Diagnoses and all orders for this visit:    Essential hypertension    Encounter for screening mammogram for breast cancer  -     Mammo Digital Screening Bilat w/ [facility] (XPD); Future    Normocytic anemia    Prediabetes  -     Basic Metabolic Panel; Standing  -     Hemoglobin A1C; Standing  -     TSH; Standing    Pure hypercholesterolemia  -     Lipid Panel; Standing    Takotsubo cardiomyopathy    Calculus of gallbladder without cholecystitis without obstruction  -     Hepatic Function Panel; Standing    Fatty liver  -     CBC Without Differential; Standing  -     Hepatic Function Panel; Standing    Other secondary chronic gout of multiple sites without tophus  -     Uric Acid; Standing    Need for vaccination  -     measles, mumps and rubella vaccine 1,000-12,500 TCID50/0.5 mL injection 0.5 mL  -     hepatitis B virus vacc.rec(PF) injection 20 mcg  -     hepatitis B virus vacc.rec(PF) injection 20 mcg  -     pneumoc 20-madonna conj-dip cr(PF) (PREVNAR-20 (PF)) injection Syrg 0.5 mL    Mild episode of recurrent major depressive disorder  -     [facility] E-[facility]  -     Ambulatory referral/consult to Behavioral Health; Future        Assessment & Plan    IMPRESSION:  Reviewed recent labs showing improved folate, hemoglobin, and B12 levels.  Noted persistent elevation in AST and US findings of fatty liver and cholelithiasis.  Assessed BP control with recent medication adjustments.  Evaluated mental health status,  considering recent grief and alcohol use as a coping mechanism.  Started Lexapro to address depressive symptoms and support grief management, with flexibility to take in morning or evening (evening dosing suggested if drowsiness occurs).    PATIENT EDUCATION:  - Explained the concept of mental health treatment as similar to treating a physical injury, emphasizing the temporary nature of support while healing occurs.  - Discussed the effects of alcohol on emotional regulation and sleep quality.    ACTION ITEMS/LIFESTYLE:  - Recommend reducing alcohol intake, particularly as a coping mechanism for stress and sleep.  - Patient to engage with therapy resources provided, such as [facility], [facility], or [facility].    MEDICATIONS:  - Started Lexapro to address depressive symptoms and support grief management, with flexibility to take in morning or evening (evening dosing suggested if drowsiness occurs).    ORDERS:  - Administered second dose of Hepatitis B vaccine.  - Administered booster dose of MMR vaccine.  - Administered pneumonia vaccine.    FOLLOW UP:  - Contact the office in about 1 month to check on progress with the new medication.             Subjective:       Patient ID: José Miguel Meng is a 49 y.o. female.    Chief Complaint: Follow-up      Interim Hx  Concerns today  Chronic problems       Last 5 Patient Entered Readings                Current 30 Day Average: 135/89  Recent Readings 4/10/2025 4/8/2025 3/30/2025 3/24/2025 3/22/2025   SBP (mmHg) 146 136 113 124 144   DBP (mmHg) 94 90 79 82 93   Pulse 78 75 78 74 73               History of Present Illness    CHIEF COMPLAINT:  Patient presents today for follow up    MENTAL HEALTH:  She continues to struggle with grief following a death in family. Though prescribed Lexapro, she has not initiated the medication. She has not pursued recommended therapy or counseling services.    SUBSTANCE USE AND SLEEP:  She reports consuming three glasses of wine daily  "to cope with stress and aid sleep. She acknowledges this is detrimental to her health but has become dependent on alcohol for sleep initiation.    MEDICAL HISTORY:  She has a history of gout, hypertension, type 2 diabetes, vitamin D deficiency, hyperlipidemia, and family history of early coronary disease.    LIVER AND GALLBLADDER:  Ultrasound showed fatty liver and gallstones. She expresses concern about potential progression of gallstones.    LABS:  Blood work from April 11th showed folate improved from 2.2 to 4.2, hemoglobin improved from 9.4 to 11.0, and B12 improved from 383 to 668. AST remains elevated at 72.      ROS:  Psychiatric: +depression, +sleep difficulty, +alcohol abuse, +increased substance use, +difficulty falling asleep, +increased stressors           Review of Systems   All other systems reviewed and are negative.          Health Maintenance Due   Topic Date Due    COVID-19 Vaccine (3 - 2024-25 season) 09/01/2024    Mammogram  07/08/2025         Objective:     /82 (BP Location: Right arm, Patient Position: Sitting)   Pulse 80   Ht 5' 8" (1.727 m)   Wt 80.4 kg (177 lb 4 oz)   LMP 04/07/2016 (Exact Date)   SpO2 99%   BMI 26.95 kg/m²   .Physical Exam    Vitals: Blood pressure: 120/82.               4/15/2025    11:02 AM 1/14/2025    10:34 AM 12/12/2024    10:01 AM 12/7/2024    12:15 PM 12/6/2024    11:14 PM   Vitals   Height 5' 8" (1.727 m) 5' 8" (1.727 m) 5' 8" (1.727 m) 5' 8" (1.727 m) 5' 8" (1.727 m)   Weight (lbs) 177.25 172.4 173.5 169    BMI (kg/m2) 27 26.2 26.4 25.7           Physical Exam  Vitals and nursing note reviewed.   Constitutional:       General: She is not in acute distress.     Appearance: She is well-developed. She is not diaphoretic.   HENT:      Head: Normocephalic.      Nose: Nose normal.   Eyes:      General:         Right eye: No discharge.         Left eye: No discharge.      Conjunctiva/sclera: Conjunctivae normal.      Pupils: Pupils are equal, round, and " reactive to light.   Cardiovascular:      Rate and Rhythm: Normal rate and regular rhythm.      Heart sounds: Normal heart sounds. No murmur heard.     No friction rub. No gallop.   Pulmonary:      Effort: Pulmonary effort is normal. No respiratory distress.   Abdominal:      General: Bowel sounds are normal. There is no distension.      Palpations: Abdomen is soft.   Musculoskeletal:         General: No deformity. Normal range of motion.      Cervical back: Normal range of motion.   Skin:     General: Skin is warm.   Neurological:      Mental Status: She is alert and oriented to person, place, and time.      Cranial Nerves: No cranial nerve deficit.   Psychiatric:      Comments: Tearful during exam            Recent Results (from the past 2 weeks)   Folate    Collection Time: 04/11/25 10:09 AM   Result Value Ref Range    Folate Level 4.2 4.0 - 24.0 ng/mL   Vitamin B12    Collection Time: 04/11/25 10:09 AM   Result Value Ref Range    Vitamin B12 668 210 - 950 pg/mL   Reticulocytes    Collection Time: 04/11/25 10:09 AM   Result Value Ref Range    Retic Count, Automated 2.1 0.5 - 2.5 %   CBC with Differential    Collection Time: 04/11/25 10:09 AM   Result Value Ref Range    WBC 4.52 3.90 - 12.70 K/uL    RBC 3.30 (L) 4.00 - 5.40 M/uL    HGB 11.0 (L) 12.0 - 16.0 gm/dL    HCT 33.8 (L) 37.0 - 48.5 %     (H) 82 - 98 fL    MCH 33.3 (H) 27.0 - 31.0 pg    MCHC 32.5 32.0 - 36.0 g/dL    RDW 14.2 11.5 - 14.5 %    Platelet Count 216 150 - 450 K/uL    MPV 10.7 9.2 - 12.9 fL    Nucleated RBC 0 <=0 /100 WBC    Neut % 45.5 38 - 73 %    Lymph % 44.5 18 - 48 %    Mono % 5.8 4 - 15 %    Eos % 3.1 <=8 %    Basophil % 0.9 <=1.9 %    Imm Grans % 0.2 0.0 - 0.5 %    Neut # 2.06 1.8 - 7.7 K/uL    Lymph # 2.01 1 - 4.8 K/uL    Mono # 0.26 (L) 0.3 - 1 K/uL    Eos # 0.14 <=0.5 K/uL    Baso # 0.04 <=0.2 K/uL    Imm Grans # 0.01 0.00 - 0.04 K/uL         Future Appointments   Date Time Provider Department Center   5/13/2025 To Be Determined  [facility] E-VISIT [facility] EVISIT None   7/15/2025 11:30 AM [facility] MAMMO1 [facility] MAMMO [place]   8/15/2025 10:00 AM INJECTION, ONEL [facility]   10/13/2025  7:40 AM LAB, [facility] LAB [place]   10/17/2025  9:30 AM Emmie Frausto PA-C [facility]         Medication List with Changes/Refills   Current Medications    ALLOPURINOL (ZYLOPRIM) 300 MG TABLET    Take 1 tablet (300 mg total) by mouth once daily.    AMLODIPINE (NORVASC) 2.5 MG TABLET    Take 1 tablet (2.5 mg total) by mouth once daily.    ASPIRIN 81 MG CHEW    Take 1 tablet (81 mg total) by mouth once daily.    ATORVASTATIN (LIPITOR) 40 MG TABLET    Take 1 tablet (40 mg total) by mouth every evening.    CLOPIDOGREL (PLAVIX) 75 MG TABLET    Take 1 tablet (75 mg total) by mouth once daily.    ESCITALOPRAM OXALATE (LEXAPRO) 5 MG TAB    Take 1 tablet (5 mg total) by mouth once daily.    IBUPROFEN (ADVIL,MOTRIN) 200 MG TABLET    Take 200-800 mg by mouth every 6 (six) hours as needed for Pain.    METOPROLOL SUCCINATE (TOPROL-XL) 25 MG 24 HR TABLET    Take 1 tablet (25 mg total) by mouth 2 (two) times daily.    NITROGLYCERIN (NITROSTAT) 0.4 MG SL TABLET    Place 1 tablet (0.4 mg total) under the tongue every 5 (five) minutes as needed for Chest pain.    PANTOPRAZOLE (PROTONIX) 40 MG TABLET    Take 1 tablet (40 mg total) by mouth every morning.    TELMISARTAN (MICARDIS) 80 MG TAB    Take 1 tablet (80 mg total) by mouth once daily.    VIT T26-BLQYLZV FACT-FA CMB #2 (INTRINSI B12-FOLATE) 500- MCG-MG-MCG TAB    Take 1 tablet by mouth once daily.         Disclaimer:  This note has been generated using voice-recognition software. There may be grammatical or spelling errors that have been missed during proof-reading   This note was generated with the assistance of ambient listening technology. Verbal consent was obtained by the patient and accompanying visitor(s) for the recording of patient appointment to facilitate this note. I attest to  having reviewed and edited the generated note for accuracy, though some syntax or spelling errors may persist. Please contact the author of this note for any clarification.

## 2025-04-17 ENCOUNTER — PATIENT MESSAGE (OUTPATIENT)
Dept: ADMINISTRATIVE | Facility: OTHER | Age: 50
End: 2025-04-17
Payer: MEDICAID

## 2025-05-13 ENCOUNTER — E-VISIT (OUTPATIENT)
Dept: FAMILY MEDICINE | Facility: CLINIC | Age: 50
End: 2025-05-13
Payer: MEDICAID

## 2025-05-13 ENCOUNTER — HOSPITAL ENCOUNTER (EMERGENCY)
Facility: HOSPITAL | Age: 50
Discharge: HOME | End: 2025-05-13
Attending: EMERGENCY MEDICINE
Payer: MEDICAID

## 2025-05-13 VITALS
OXYGEN SATURATION: 100 % | TEMPERATURE: 98 F | SYSTOLIC BLOOD PRESSURE: 112 MMHG | HEART RATE: 68 BPM | HEIGHT: 68 IN | WEIGHT: 175 LBS | BODY MASS INDEX: 26.52 KG/M2 | RESPIRATION RATE: 18 BRPM | DIASTOLIC BLOOD PRESSURE: 78 MMHG

## 2025-05-13 DIAGNOSIS — F41.9 ANXIETY: ICD-10-CM

## 2025-05-13 DIAGNOSIS — R55 SYNCOPE: ICD-10-CM

## 2025-05-13 LAB
ABSOLUTE EOSINOPHIL (OHS): 0.06 K/UL
ABSOLUTE MONOCYTE (OHS): 0.27 K/UL (ref 0.3–1)
ABSOLUTE NEUTROPHIL COUNT (OHS): 1.79 K/UL (ref 1.8–7.7)
ALBUMIN SERPL BCP-MCNC: 3.6 G/DL (ref 3.5–5.2)
ALP SERPL-CCNC: 65 UNIT/L (ref 40–150)
ALT SERPL W/O P-5'-P-CCNC: 22 UNIT/L (ref 10–44)
ANION GAP (OHS): 11 MMOL/L (ref 8–16)
AST SERPL-CCNC: 63 UNIT/L (ref 11–45)
BASOPHILS # BLD AUTO: 0.04 K/UL
BASOPHILS NFR BLD AUTO: 1 %
BILIRUB SERPL-MCNC: 0.5 MG/DL (ref 0.1–1)
BNP SERPL-MCNC: <10 PG/ML (ref 0–99)
BUN SERPL-MCNC: 12 MG/DL (ref 6–20)
CALCIUM SERPL-MCNC: 8.9 MG/DL (ref 8.7–10.5)
CHLORIDE SERPL-SCNC: 106 MMOL/L (ref 95–110)
CO2 SERPL-SCNC: 21 MMOL/L (ref 23–29)
CREAT SERPL-MCNC: 1 MG/DL (ref 0.5–1.4)
ERYTHROCYTE [DISTWIDTH] IN BLOOD BY AUTOMATED COUNT: 13.9 % (ref 11.5–14.5)
GFR SERPLBLD CREATININE-BSD FMLA CKD-EPI: >60 ML/MIN/1.73/M2
GLUCOSE SERPL-MCNC: 81 MG/DL (ref 70–110)
HCT VFR BLD AUTO: 31.8 % (ref 37–48.5)
HCV AB SERPL QL IA: NORMAL
HGB BLD-MCNC: 10.6 GM/DL (ref 12–16)
HIV 1+2 AB+HIV1 P24 AG SERPL QL IA: NORMAL
HOLD SPECIMEN: NORMAL
IMM GRANULOCYTES # BLD AUTO: 0.02 K/UL (ref 0–0.04)
IMM GRANULOCYTES NFR BLD AUTO: 0.5 % (ref 0–0.5)
LYMPHOCYTES # BLD AUTO: 1.96 K/UL (ref 1–4.8)
MCH RBC QN AUTO: 32.7 PG (ref 27–31)
MCHC RBC AUTO-ENTMCNC: 33.3 G/DL (ref 32–36)
MCV RBC AUTO: 98 FL (ref 82–98)
NUCLEATED RBC (/100WBC) (OHS): 0 /100 WBC
OHS QRS DURATION: 78 MS
OHS QTC CALCULATION: 455 MS
PLATELET # BLD AUTO: 221 K/UL (ref 150–450)
PMV BLD AUTO: 10.4 FL (ref 9.2–12.9)
POCT GLUCOSE: 82 MG/DL (ref 70–110)
POTASSIUM SERPL-SCNC: 4 MMOL/L (ref 3.5–5.1)
PROT SERPL-MCNC: 7.3 GM/DL (ref 6–8.4)
RBC # BLD AUTO: 3.24 M/UL (ref 4–5.4)
RELATIVE EOSINOPHIL (OHS): 1.4 %
RELATIVE LYMPHOCYTE (OHS): 47.3 % (ref 18–48)
RELATIVE MONOCYTE (OHS): 6.5 % (ref 4–15)
RELATIVE NEUTROPHIL (OHS): 43.3 % (ref 38–73)
SODIUM SERPL-SCNC: 138 MMOL/L (ref 136–145)
TROPONIN I SERPL HS-MCNC: <3 NG/L
TROPONIN I SERPL HS-MCNC: <3 NG/L
WBC # BLD AUTO: 4.14 K/UL (ref 3.9–12.7)

## 2025-05-13 PROCEDURE — 85025 COMPLETE CBC W/AUTO DIFF WBC: CPT | Performed by: EMERGENCY MEDICINE

## 2025-05-13 PROCEDURE — 86803 HEPATITIS C AB TEST: CPT | Performed by: EMERGENCY MEDICINE

## 2025-05-13 PROCEDURE — 83880 ASSAY OF NATRIURETIC PEPTIDE: CPT | Performed by: EMERGENCY MEDICINE

## 2025-05-13 PROCEDURE — 84484 ASSAY OF TROPONIN QUANT: CPT | Performed by: EMERGENCY MEDICINE

## 2025-05-13 PROCEDURE — 99284 EMERGENCY DEPT VISIT MOD MDM: CPT | Mod: 25

## 2025-05-13 PROCEDURE — 82040 ASSAY OF SERUM ALBUMIN: CPT | Performed by: EMERGENCY MEDICINE

## 2025-05-13 PROCEDURE — 82962 GLUCOSE BLOOD TEST: CPT

## 2025-05-13 PROCEDURE — 99421 OL DIG E/M SVC 5-10 MIN: CPT | Mod: ,,, | Performed by: INTERNAL MEDICINE

## 2025-05-13 PROCEDURE — 87389 HIV-1 AG W/HIV-1&-2 AB AG IA: CPT | Performed by: EMERGENCY MEDICINE

## 2025-05-13 PROCEDURE — 93010 ELECTROCARDIOGRAM REPORT: CPT | Mod: ,,, | Performed by: INTERNAL MEDICINE

## 2025-05-13 PROCEDURE — 93005 ELECTROCARDIOGRAM TRACING: CPT

## 2025-05-13 NOTE — ED PROVIDER NOTES
Encounter Date: 5/13/2025       History     Chief Complaint   Patient presents with    Loss of Consciousness     Syncopal episode this morning. Denies head trauma. Denies chest pain, SOB.      49-year-old female with history of vasovagal syncope, MI with clean coronaries presents with a syncopal episode.  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  She started to feel flushed and sweaty and passed out.  Denies any headache chest discomfort shortness of breath or palpitations.  This is a familiar feeling to her.  She passes out frequently.  Often it is on the toilet.  Often it is with stressful situations.  She ate a normal breakfast and had a normal day yesterday.  She feels back to normal now.        Review of patient's allergies indicates:  No Known Allergies  Past Medical History:   Diagnosis Date    Hypertension      Past Surgical History:   Procedure Laterality Date    BREAST SURGERY  02/15/2019    Reduction    HYSTERECTOMY      LEFT HEART CATHETERIZATION Left 11/11/2024    Procedure: Left heart cath;  Surgeon: Jorden Fong MD;  Location: [facility] CATH LAB/EP;  Service: Cardiology;  Laterality: Left;    REDUCTION OF BOTH BREASTS Bilateral 02/15/2019    Procedure: MAMMOPLASTY, REDUCTION, BILATERAL;  Surgeon: Rajinder Aldrich MD;  Location: [facility] OR [address] [place] [address];  Service: Plastics;  Laterality: Bilateral;    TOTAL REDUCTION MAMMOPLASTY  02/14/2019    TUBAL LIGATION      TUBAL LIGATION      VENTRICULOGRAM, LEFT  11/11/2024    Procedure: Ventriculogram, Left;  Surgeon: Jorden Fong MD;  Location: [facility] CATH LAB/EP;  Service: Cardiology;;     Family History   Problem Relation Name Age of Onset    Hypertension Mother Myah         Soniat    Hypertension Father Keegan         Soniat    Stroke Father Keegan     Heart disease Sister  45    Vision loss Maternal Grandmother Kirsten Stephenson      Social History[1]  Review of Systems    Physical Exam     Initial Vitals [05/13/25 0903]   BP Pulse Resp Temp SpO2   110/80 66 20 97.9 °F (36.6  °C) 100 %      MAP       --         Physical Exam    Constitutional: She appears well-developed and well-nourished. She is not diaphoretic. No distress.   HENT:   Head: Normocephalic and atraumatic.   Eyes: Conjunctivae are normal.   Neck: Neck supple. No tracheal deviation present. No JVD present.   Normal range of motion.  Cardiovascular:  Normal rate, regular rhythm, normal heart sounds and intact distal pulses.           Pulmonary/Chest: Breath sounds normal. No respiratory distress. She has no wheezes. She has no rhonchi. She has no rales.   Abdominal: Abdomen is soft. Bowel sounds are normal. She exhibits no distension. There is no abdominal tenderness. There is no rebound.   Musculoskeletal:         General: No edema.      Cervical back: Normal range of motion and neck supple.     Neurological: She is alert. She has normal strength. No sensory deficit. GCS score is 15. GCS eye subscore is 4. GCS verbal subscore is 5. GCS motor subscore is 6.   Skin: Skin is warm. No rash noted.   Psychiatric: She has a normal mood and affect.         ED Course   Procedures  Labs Reviewed   COMPREHENSIVE METABOLIC PANEL - Abnormal       Result Value    Sodium 138      Potassium 4.0      Chloride 106      CO2 21 (*)     Glucose 81      BUN 12      Creatinine 1.0      Calcium 8.9      Protein Total 7.3      Albumin 3.6      Bilirubin Total 0.5      ALP 65      AST 63 (*)     ALT 22      Anion Gap 11      eGFR >60     CBC WITH DIFFERENTIAL - Abnormal    WBC 4.14      RBC 3.24 (*)     HGB 10.6 (*)     HCT 31.8 (*)     MCV 98      MCH 32.7 (*)     MCHC 33.3      RDW 13.9      Platelet Count 221      MPV 10.4      Nucleated RBC 0      Neut % 43.3      Lymph % 47.3      Mono % 6.5      Eos % 1.4      Basophil % 1.0      Imm Grans % 0.5      Neut # 1.79 (*)     Lymph # 1.96      Mono # 0.27 (*)     Eos # 0.06      Baso # 0.04      Imm Grans # 0.02     HEPATITIS C ANTIBODY - Normal    Hep C Ab Interp Non-Reactive     HIV 1 / 2 ANTIBODY  - Normal    HIV 1/2 Ag/Ab Non-Reactive     TROPONIN I HIGH SENSITIVITY - Normal    Troponin High Sensitive <3     B-TYPE NATRIURETIC PEPTIDE - Normal    BNP <10     TROPONIN I HIGH SENSITIVITY - Normal    Troponin High Sensitive <3     HEP C VIRUS HOLD SPECIMEN    Extra Tube Hold for add-ons.     CBC W/ AUTO DIFFERENTIAL    Narrative:     The following orders were created for panel order CBC auto differential.  Procedure                               Abnormality         Status                     ---------                               -----------         ------                     CBC with Differential[[id number]]       Abnormal            Final result                 Please view results for these tests on the individual orders.     EKG Readings: (Independently Interpreted)   Normal sinus rhythm at 65 without acute ischemic changes.  T wave inversions in anterior lateral leads or similar to prior EKG.       Imaging Results    None          Medications - No data to display  Medical Decision Making  Patient with episode of syncope.  This sounds like a recurrence of her vasovagal syncope.  No chest discomfort or shortness of breath to suggest acute MI. EKGs reassuring in her current symptoms are reassuring.  Will check 2 troponins and watch on cardiac monitor.  No palpitations to suggest a arrhythmia yet.  No shortness of breath to suggest pulmonary embolus.  Nothing to suggest a neurologic episodes such as seizure.  Will check basic labs 2 troponins watch on cardiac monitor.      Studies reassuring.  Will discharge home    Amount and/or Complexity of Data Reviewed  Labs: ordered.                                      Clinical Impression:  Final diagnoses:  [R55] Syncope          ED Disposition Condition    Discharge Stable          ED Prescriptions    None       Follow-up Information       Follow up With Specialties Details Why Contact Steve Giordano III, MD Internal Medicine Schedule an appointment as soon  as possible for a visit   [address] [facility]  [place] [address]  [phone]      [facility] Emergency Medicine  If symptoms worsen [id number]  [facility] [address] [phone]                 [1]   Social History  Tobacco Use    Smoking status: Never     Passive exposure: Current    Smokeless tobacco: Never   Substance Use Topics    Alcohol use: Yes     Alcohol/week: 5.0 standard drinks of alcohol     Types: 5 Glasses of wine per week     Comment: socially    Drug use: No        Elpidio Frances MD  05/13/25 1234

## 2025-05-13 NOTE — ED NOTES
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 When she regained consciousness, she felt weakness, hot and sweaty. Has had syncope before. Denies chest pain. Denies sob. "Just feels really weak'  "

## 2025-05-13 NOTE — CODE/ RAPID DOCUMENTATION
RAPID RESPONSE NURSE NOTE        Admit Date: 2025  LOS: 0  Code Status: Prior   Date of Consult: 2025  : 1975  Age: 49 y.o.  Weight:   Wt Readings from Last 1 Encounters:   25 79.4 kg (175 lb)     Sex: female  Race: Black or    Bed: INT 03/INT 03:   MRN: [id number]  Time Rapid Response Team page Received: 0836  Time Rapid Response Team at Bedside: 0838  Time Rapid Response Team left Bedside: 0852  Was the patient discharged from an ICU this admission? No  Was the patient discharged from a PACU within last 24 hours? No   Did the patient receive conscious sedation/general anesthesia in last 24 hours? No  Was the patient in the ED within the past 24 hours? No  Was the patient on NIPPV within the past 24 hours? No   Did this progress into an ARC or CPA: No  Attending Physician: TARUN  Primary Service: NA    SITUATION    Notified by overhead page.  Reason for alert: Rapid Response  Called to evaluate the patient for Neuro    BACKGROUND     Why is the patient in the hospital?: She is shadowing for school.    Patient has a past medical history of Hypertension.    ASSESSMENT/INTERVENTIONS    What did you find: Pt seated in a chair, AOX4, c/o weakness. Per report, pt stated to staff she felt dizzy, was brought to an office, and then lost consciousness for approximately 5 minutes. On our exam, pt is awake and able to move all extremities. Pt taken to ED for evaluation.    RECOMMENDATIONS    We recommend: ED transfer and evaluation.    PROVIDER ESCALATION    Orders received and case discussed with [facility].    FOLLOW UP    Call the Rapid Response Nurse, Chandan Leong RN at [phone] for additional questions or concerns.

## 2025-05-13 NOTE — Clinical Note
"Treniece "Mariodino" Taqueria was seen and treated in our emergency department on 5/13/2025.  She may return to work on 05/14/2025.       If you have any questions or concerns, please don't hesitate to call.      Elpidio Frances MD"

## 2025-05-13 NOTE — CODE/ RAPID DOCUMENTATION
"RAPID RESPONSE RESPIRATORY THERAPY NOTE             Code Status: Prior   : 1975  Bed: INT 03/INT 03:   MRN: [id number]  Time page Received: 0836  Time Rapid Response RT at Bedside: 838  Time Rapid Response RT left Bedside: 909    SITUATION    Evaluated patient for: Syncope    BACKGROUND    Why is the patient in the hospital?: <principal problem not specified>    Patient has a past medical history of Hypertension.    24 Hours Vitals Range:  Temp:  [97.9 °F (36.6 °C)]   Pulse:  [66]   Resp:  [20]   BP: (110)/(80)   SpO2:  [100 %]     Labs:    No results for input(s): "NA", "K", "CL", "CO2", "BUN", "CREATININE", "GLU", "PHOS", "MG" in the last 72 hours.    Invalid input(s): "CMP", "TBIL"     No results for input(s): "PH", "PCO2", "PO2", "HCO3", "POCSATURATED", "BE" in the last 72 hours.    ASSESSMENT/INTERVENTIONS  Rapid paged to venipuncture lab. Patient light headed, weak, hot and sweaty. Patient brought to ED for further evaluation.    Last Vitals: Temp: 97.9 °F (36.6 °C) (903)  Pulse: 66 (903)  Resp: 20 (903)  BP: 110/80 (903)  SpO2: 100 % (903)  Level of Consciousness: Level of Consciousness (AVPU): alert  Respiratory Effort:    Expansion/Accessory Muscle Usage:    All Lung Field Breath Sounds:    O2 Device/Concentration: room air  NIPPV: No Surgical airway: No Vent: No  ETCO2 monitored:    Ambu at bedside: no    Active Orders   Respiratory Care    Pulse Oximetry Continuous     Frequency: Continuous     Number of Occurrences: Until Specified       RECOMMENDATIONS  ?  We recommend: RRT Recs: Continue POC per primary team.    ESCALATION    N/A    FOLLOW-UP    Disposition: Tx to ER bed INT 03.    Please call back the Rapid Response RT, Julienne Morales, RRT at x [phone] for any questions or concerns.              "

## 2025-05-19 RX ORDER — ESCITALOPRAM OXALATE 5 MG/1
5 TABLET ORAL DAILY
Qty: 90 TABLET | Refills: 3 | Status: SHIPPED | OUTPATIENT
Start: 2025-05-19 | End: 2026-05-19

## 2025-05-19 NOTE — PROGRESS NOTES
"  Patient ID: José Miguel Meng is a 49 y.o. female.    Chief Complaint: Mood Disorder (Entered automatically based on patient selection in [employer].)    The patient initiated a request through [employer] on 5/13/2025 for evaluation and management with a chief complaint of Mood Disorder (Entered automatically based on patient selection in [employer].)     I evaluated the questionnaire submission on 05/19/2025  .    [facility] P[facility] Evisit Anxiety/Depression    5/19/2025  4:25 AM CDT - Filed by Patient   Do you agree to participate in an E-Visit? Yes   If you have any of the following symptoms, please present to your local emergency room or call 911:  I acknowledge   Medication requests for narcotics will not be addressed via an E-Visit.  Please schedule an appointment. I acknowledge   Choose the state of your primary residence [place]   Do you have any of the following pregnancy-related conditions? None   What is the main issue you would like addressed today? Medication follow up   Fear of embarrassment causes me to avoid doing things or speaking to people. No   I avoid activities in which I am the center of attention. Yes   Being embarrassed or looking stupid are among my worst fears. Yes   I would like to address: Medication for Anxiety or Depression   By selecting "I understand," you acknowledge that the answers that you provide for this questionnaire may not be immediately viewed by your provider or your care team. If you are personally dealing with suicidal thoughts or a crisis, please consider contacting your provider's office.  If your provider is not available, please consider taking action by: calling 911 or the National Suicide Prevention Lifeline any day, any time at 1-105.825.8077 or texting SIGNS to 903641 for 24/7 anonymous, free crisis counseling. I understand   Over the last 2 weeks, how often have you been bothered by the following problems?   Little interest or pleasure in doing things Not at all "   Feeling down, depressed, or hopeless Not at all   PHQ-2 Score (range: 0 - 6) 0 (Further screening not recommended)   Feeling nervous, anxious, on edge Not at all   Not being able to stop or control worrying Not at all   Worrying too much about different things Several days   Trouble relaxing Several days   Being so restless that its hard to sit still Not at all   Becoming easily annoyed or irritable Not at all   Feeling afraid as if something awful might happen Several days   If you marked you are experiencing any of the aforementioned problems, how difficult have these made it for you to do your work, take care of things at home, or get along with other people? Somewhat difficult   TOTAL SCORE: (range: 0 - 21) 3   Do you want to address a new or existing medication? I would like to address a medication I currently take   Would you like to change or continue your medication? Continue medication   What medication would you like to continue?  Escitalopram   Are you taking it as prescribed? Yes    What medical condition is the  medication intended to treat? Anxiety   Is the medication helping your condition? Yes   Are you having any side effects from the medication? Yes   What are the side effects? Low libido   Provide any additional information you feel is important.    Please attach any relevant images or files    Are you able to take your vital signs? Yes   Systolic Blood Pressure: 121   Diastolic Blood Pressure: 80   Weight: 175   Height: 67   Pulse:    Temperature:    Respiration rate:    Pulse Oxygen:          Encounter Diagnosis   Name Primary?    Anxiety         No orders of the defined types were placed in this encounter.     Medications Ordered This Encounter   Medications    EScitalopram oxalate (LEXAPRO) 5 MG Tab     Sig: Take 1 tablet (5 mg total) by mouth once daily.     Dispense:  90 tablet     Refill:  3        No follow-ups on file.      E-Visit Time Tracking:                          Future  Appointments   Date Time Provider Department Center   [facility] [place]   8/15/2025 10:00 AM INJECTION, ONEL [facility]   10/13/2025  7:40 AM [facility]URAV [facility] LAB [place]   10/17/2025  9:30 AM Emmie Frausto PA-C [facility]         Medication List with Changes/Refills   Current Medications    ALLOPURINOL (ZYLOPRIM) 300 MG TABLET    Take 1 tablet (300 mg total) by mouth once daily.    AMLODIPINE (NORVASC) 2.5 MG TABLET    Take 1 tablet (2.5 mg total) by mouth once daily.    ASPIRIN 81 MG CHEW    Take 1 tablet (81 mg total) by mouth once daily.    ATORVASTATIN (LIPITOR) 40 MG TABLET    Take 1 tablet (40 mg total) by mouth every evening.    CLOPIDOGREL (PLAVIX) 75 MG TABLET    Take 1 tablet (75 mg total) by mouth once daily.    IBUPROFEN (ADVIL,MOTRIN) 200 MG TABLET    Take 200-800 mg by mouth every 6 (six) hours as needed for Pain.    METOPROLOL SUCCINATE (TOPROL-XL) 25 MG 24 HR TABLET    Take 1 tablet (25 mg total) by mouth 2 (two) times daily.    NITROGLYCERIN (NITROSTAT) 0.4 MG SL TABLET    Place 1 tablet (0.4 mg total) under the tongue every 5 (five) minutes as needed for Chest pain.    PANTOPRAZOLE (PROTONIX) 40 MG TABLET    Take 1 tablet (40 mg total) by mouth every morning.    TELMISARTAN (MICARDIS) 80 MG TAB    Take 1 tablet (80 mg total) by mouth once daily.    VIT Y21-[id number] FACT-FA CMB #2 (INTRINSI B12-FOLATE) 500- MCG-MG-MCG TAB    Take 1 tablet by mouth once daily.   Changed and/or Refilled Medications    Modified Medication Previous Medication    ESCITALOPRAM OXALATE (LEXAPRO) 5 MG TAB EScitalopram oxalate (LEXAPRO) 5 MG Tab       Take 1 tablet (5 mg total) by mouth once daily.    Take 1 tablet (5 mg total) by mouth once daily.         Disclaimer:  This note has been generated using voice-recognition software. There may be grammatical or spelling errors that have been missed during proof-reading

## 2025-06-17 ENCOUNTER — PATIENT MESSAGE (OUTPATIENT)
Dept: FAMILY MEDICINE | Facility: CLINIC | Age: 50
End: 2025-06-17
Payer: MEDICAID

## 2025-06-25 ENCOUNTER — OFFICE VISIT (OUTPATIENT)
Dept: FAMILY MEDICINE | Facility: CLINIC | Age: 50
End: 2025-06-25
Payer: MEDICAID

## 2025-06-25 ENCOUNTER — LAB VISIT (OUTPATIENT)
Dept: LAB | Facility: HOSPITAL | Age: 50
End: 2025-06-25
Attending: INTERNAL MEDICINE
Payer: MEDICAID

## 2025-06-25 VITALS
BODY MASS INDEX: 27.67 KG/M2 | WEIGHT: 182.56 LBS | OXYGEN SATURATION: 98 % | SYSTOLIC BLOOD PRESSURE: 116 MMHG | HEIGHT: 68 IN | DIASTOLIC BLOOD PRESSURE: 72 MMHG | HEART RATE: 80 BPM

## 2025-06-25 DIAGNOSIS — R55 SYNCOPE, UNSPECIFIED SYNCOPE TYPE: Primary | ICD-10-CM

## 2025-06-25 DIAGNOSIS — R55 SYNCOPE, UNSPECIFIED SYNCOPE TYPE: ICD-10-CM

## 2025-06-25 LAB
ALBUMIN SERPL BCP-MCNC: 4 G/DL (ref 3.5–5.2)
ALP SERPL-CCNC: 67 UNIT/L (ref 40–150)
ALT SERPL W/O P-5'-P-CCNC: 25 UNIT/L (ref 10–44)
ANION GAP (OHS): 11 MMOL/L (ref 8–16)
AST SERPL-CCNC: 59 UNIT/L (ref 11–45)
BILIRUB SERPL-MCNC: 0.5 MG/DL (ref 0.1–1)
BUN SERPL-MCNC: 12 MG/DL (ref 6–20)
CALCIUM SERPL-MCNC: 8.8 MG/DL (ref 8.7–10.5)
CHLORIDE SERPL-SCNC: 105 MMOL/L (ref 95–110)
CO2 SERPL-SCNC: 22 MMOL/L (ref 23–29)
CREAT SERPL-MCNC: 0.9 MG/DL (ref 0.5–1.4)
GFR SERPLBLD CREATININE-BSD FMLA CKD-EPI: >60 ML/MIN/1.73/M2
GLUCOSE SERPL-MCNC: 63 MG/DL (ref 70–110)
POTASSIUM SERPL-SCNC: 4.1 MMOL/L (ref 3.5–5.1)
PROT SERPL-MCNC: 7.5 GM/DL (ref 6–8.4)
SODIUM SERPL-SCNC: 138 MMOL/L (ref 136–145)
TSH SERPL-ACNC: 0.74 UIU/ML (ref 0.4–4)

## 2025-06-25 PROCEDURE — 36415 COLL VENOUS BLD VENIPUNCTURE: CPT | Mod: PO

## 2025-06-25 PROCEDURE — 1159F MED LIST DOCD IN RCRD: CPT | Mod: CPTII,,,

## 2025-06-25 PROCEDURE — 99214 OFFICE O/P EST MOD 30 MIN: CPT | Mod: S$PBB,,,

## 2025-06-25 PROCEDURE — 80053 COMPREHEN METABOLIC PANEL: CPT

## 2025-06-25 PROCEDURE — 3074F SYST BP LT 130 MM HG: CPT | Mod: CPTII,,,

## 2025-06-25 PROCEDURE — 3008F BODY MASS INDEX DOCD: CPT | Mod: CPTII,,,

## 2025-06-25 PROCEDURE — 1160F RVW MEDS BY RX/DR IN RCRD: CPT | Mod: CPTII,,,

## 2025-06-25 PROCEDURE — 93005 ELECTROCARDIOGRAM TRACING: CPT | Mod: PBBFAC,PO | Performed by: INTERNAL MEDICINE

## 2025-06-25 PROCEDURE — 99999 PR PBB SHADOW E&M-EST. PATIENT-LVL IV: CPT | Mod: PBBFAC,,,

## 2025-06-25 PROCEDURE — 3078F DIAST BP <80 MM HG: CPT | Mod: CPTII,,,

## 2025-06-25 PROCEDURE — 4010F ACE/ARB THERAPY RXD/TAKEN: CPT | Mod: CPTII,,,

## 2025-06-25 PROCEDURE — 99214 OFFICE O/P EST MOD 30 MIN: CPT | Mod: PBBFAC,PO,25

## 2025-06-25 PROCEDURE — 93010 ELECTROCARDIOGRAM REPORT: CPT | Mod: S$PBB,,, | Performed by: INTERNAL MEDICINE

## 2025-06-25 PROCEDURE — 84443 ASSAY THYROID STIM HORMONE: CPT

## 2025-06-25 NOTE — Clinical Note
Hi  Just wanted to loop you in because of her history of cardiomyopathy. Pt reports new syncopal episodes that are different than her typical vasovagal. Any recommendations for follow up ?

## 2025-06-25 NOTE — PROGRESS NOTES
[facility]    6/25/2025      Subjective:       Patient ID:  José Miguel is a 49 y.o. female .  has a past medical history of Hypertension.    History of Present Illness    CHIEF COMPLAINT:  José Miguel presents today for evaluation of two recent episodes of syncope.    RECENT SYNCOPE EPISODES:  She reports two recent syncope episodes. First episode occurred on May 13th during phlebotomy lab practice with blood pressure documented at 78/56 after. Second episode occurred last Tuesday while in class looking at microscopes. Both episodes were preceded by not eating, though she consumed Gatorade prior to the second episode. She describes becoming lightheaded, experiencing intense heat, and feeling clammy. These episodes were sudden and different from her previous vasovagal syncope experiences. She denies seizure activity, head trauma, specific triggers like blood or needles, chest pain, palpitations, or shortness of breath during these events. She experienced heat-related symptoms at a children's birthday party on Sunday, where she felt very hot and scared, prompting her to sit down without losing consciousness.    MEDICAL HISTORY:  She has a history of vasovagal syncope associated with bowel movements, including a previous fall resulting in a facial injury requiring hospitalization. She also has a history of Takotsubo cardiomyopathy from last year. She denies any blood-related triggers for her syncope episodes. She is currently under cardiologist care for management of these conditions.    CURRENT MEDICATIONS:  She takes telmisartan, Amlodipine 5 mg (recently increased from 2.5 mg) and Metoprolol for BP.       ROS:  Constitutional: -chills, -fever, +clammy skin, +heat intolerance, +feeling of increased warmth, +weakness  Respiratory: -cough, -shortness of breath  Cardiovascular: -chest pain  Gastrointestinal: -abdominal pain, -constipation, -diarrhea, -nausea, -vomiting  Neurological:  -dizziness, +lightheadedness, -headaches, +syncope           Problem List[1]      Last HgbA1C:    Lab Results   Component Value Date    HGBA1C 5.0 12/06/2024    HGBA1C 5.0 06/03/2024    HGBA1C 5.5 08/29/2023         Last Lipid Panel:    Lab Results   Component Value Date    HDL 76 (H) 12/06/2024     (H) 11/10/2024    HDL 60 06/03/2024       Lab Results   Component Value Date    LDLCALC 29.2 (L) 12/06/2024    LDLCALC 100.4 11/10/2024    LDLCALC Invalid, Trig>400.0 06/03/2024       Lab Results   Component Value Date    TRIG 229 (H) 12/06/2024    TRIG 138 11/10/2024    TRIG 437 (H) 06/03/2024       Lab Results   Component Value Date    CHOLHDL 50.3 (H) 12/06/2024    CHOLHDL 45.5 11/10/2024    CHOLHDL 26.3 06/03/2024         Review of patient's allergies indicates:  No Known Allergies     Medication List with Changes/Refills   Current Medications    ALLOPURINOL (ZYLOPRIM) 300 MG TABLET    Take 1 tablet (300 mg total) by mouth once daily.    AMLODIPINE (NORVASC) 5 MG TABLET    Take 1 tablet (5 mg total) by mouth once daily.    ASPIRIN 81 MG CHEW    Take 1 tablet (81 mg total) by mouth once daily.    ATORVASTATIN (LIPITOR) 40 MG TABLET    Take 1 tablet (40 mg total) by mouth every evening.    CLOPIDOGREL (PLAVIX) 75 MG TABLET    Take 1 tablet (75 mg total) by mouth once daily.    ESCITALOPRAM OXALATE (LEXAPRO) 5 MG TAB    Take 1 tablet (5 mg total) by mouth once daily.    IBUPROFEN (ADVIL,MOTRIN) 200 MG TABLET    Take 200-800 mg by mouth every 6 (six) hours as needed for Pain.    METOPROLOL SUCCINATE (TOPROL-XL) 25 MG 24 HR TABLET    Take 1 tablet (25 mg total) by mouth 2 (two) times daily.    NITROGLYCERIN (NITROSTAT) 0.4 MG SL TABLET    Place 1 tablet (0.4 mg total) under the tongue every 5 (five) minutes as needed for Chest pain.    PANTOPRAZOLE (PROTONIX) 40 MG TABLET    Take 1 tablet (40 mg total) by mouth every morning.    TELMISARTAN (MICARDIS) 80 MG TAB    Take 1 tablet (80 mg total) by mouth once daily.     "VIT P43-[id number] FACT-FA CMB #2 (INTRINSI B12-FOLATE) 500- MCG-MG-MCG TAB    Take 1 tablet by mouth once daily.               Objective:      /72 (BP Location: Right arm, Patient Position: Sitting)   Pulse 80   Ht 5' 8" (1.727 m)   Wt 82.8 kg (182 lb 8.7 oz)   LMP 04/07/2016 (Exact Date)   SpO2 98%   BMI 27.76 kg/m²   Estimated body mass index is 27.76 kg/m² as calculated from the following:    Height as of this encounter: 5' 8" (1.727 m).    Weight as of this encounter: 82.8 kg (182 lb 8.7 oz).    Physical Exam  Vitals reviewed.   Constitutional:       General: She is not in acute distress.     Appearance: Normal appearance.   HENT:      Head: Normocephalic and atraumatic.      Mouth/Throat:      Mouth: Mucous membranes are moist.   Eyes:      Extraocular Movements: Extraocular movements intact.      Conjunctiva/sclera: Conjunctivae normal.      Pupils: Pupils are equal, round, and reactive to light.   Cardiovascular:      Rate and Rhythm: Normal rate and regular rhythm.   Pulmonary:      Effort: Pulmonary effort is normal. No respiratory distress.   Abdominal:      Palpations: Abdomen is soft.   Musculoskeletal:      Cervical back: Normal range of motion.   Skin:     General: Skin is warm.   Neurological:      General: No focal deficit present.      Mental Status: She is alert and oriented to person, place, and time.      Cranial Nerves: No cranial nerve deficit.   Psychiatric:         Mood and Affect: Mood normal.         Behavior: Behavior normal.             Assessment and Plan:   1. Syncope, unspecified syncope type  - Comprehensive Metabolic Panel; Future  - TSH; Future  - IN OFFICE EKG 12-LEAD (to Muse)     Assessment & Plan    IMPRESSION:  - Assessed recent syncope episodes, noting atypical presentation without clear triggers   - Reviewed cardiac history, including previous Takotsubo cardiomyopathy   - Considered potential contributing factors such as low BP, dehydration, and low blood " sugar.  - Performed brief neurological exam to rule out neurological causes.    PLAN SUMMARY:  - Order in-office EKG  - Order CMP to check electrolytes  - Order thyroid function test  - Advise patient to stay well-hydrated throughout the day  - Recommend eating before activities, especially in the morning    SYNCOPE:  - Monitored the patient who experienced two witnessed episodes of syncope, one in a lab and another in class, with no blood involved.  - [place] reports feeling lightheaded, hot, and clammy before episodes.  - Blood pressure was 78/56 during an episode but returned to normal by the time the patient was seen in the emergency room.  - EKG in the emergency room was normal with no seizure activity reported.  - Episodes are consistent with vasovagal syncope.  - Ordered in-office EKG, CMP to check electrolytes, and thyroid function test.  - Advised patient to stay well-hydrated throughout the day and ensure eating before activities, especially in the morning, to prevent low blood sugar which could trigger syncope.  -Will consult cards due to her cardiac hx           The patient was informed of the following statements     Emergency Care:Seek immediate medical attention in the emergency room if you experience any new or worsening symptoms, or if your current condition significantly changes or becomes more severe.  Patient Acknowledgment: Patient verbalizes understanding of the plan and agrees to proceed with the recommended care.      Follow Up:  8/15/2025 ONEL GARCIA   Future Appointments   Date Time Provider Department Center   6/25/2025  2:45 PM LAB, ONEL [facility] [place]   7/15/2025 11:30 AM DESH O[facility] MAMMO1 [facility] MAMMO [place]   8/15/2025 10:00 AM ONEL GARCIA [facility]   10/13/2025  7:40 AM LAB, DESTREHAN DES [facility] [place]   10/17/2025  9:30 [facility]                     No follow-ups on file.    Other Orders Placed This Visit:  Orders Placed  This Encounter   Procedures    Comprehensive Metabolic Panel    TSH    IN OFFICE EKG 12-LEAD (to Muse)         This note was generated with the assistance of ambient listening technology. Verbal consent was obtained by the patient and accompanying visitor(s) for the recording of patient appointment to facilitate this note. I attest to having reviewed and edited the generated note for accuracy, though some syntax or spelling errors may persist. Please contact the author of this note for any clarification.        Emmie Frausto PA-C             [1]   Patient Active Problem List  Diagnosis    Menometrorrhagia    Macromastia    Normocytic anemia    Other secondary gout, multiple sites    Vitamin D deficiency    Essential hypertension    Class 1 obesity due to excess calories with serious comorbidity and body mass index (BMI) of 33.0 to 33.9 in adult    Prediabetes    Impaired mobility and activities of daily living    Syncope and collapse    Hyponatremia    Leg swelling subjective     Pure hypercholesterolemia    Family history of early CAD    Chest pain    NSTEMI (non-ST elevated myocardial infarction)    Hyperlipidemia    Takotsubo cardiomyopathy    Calculus of gallbladder without cholecystitis without obstruction    Fatty liver

## 2025-06-26 LAB
OHS QRS DURATION: 76 MS
OHS QTC CALCULATION: 438 MS

## 2025-06-27 ENCOUNTER — RESULTS FOLLOW-UP (OUTPATIENT)
Dept: FAMILY MEDICINE | Facility: CLINIC | Age: 50
End: 2025-06-27

## 2025-07-31 DIAGNOSIS — M1A.49X0 OTHER SECONDARY CHRONIC GOUT OF MULTIPLE SITES WITHOUT TOPHUS: ICD-10-CM

## 2025-07-31 RX ORDER — ALLOPURINOL 300 MG/1
300 TABLET ORAL
Qty: 90 TABLET | Refills: 0 | Status: SHIPPED | OUTPATIENT
Start: 2025-07-31

## 2025-07-31 NOTE — TELEPHONE ENCOUNTER
Refill Routing Note   Medication(s) are not appropriate for processing by [facility] for the following reason(s):        Required vitals outdated  Required labs abnormal  ED/Hospital Visit since last OV with provider    [facility] action(s):  Defer      Medication Therapy Plan: FLOS      Appointments  past 12m or future 3m with PCP    Date Provider   Last Visit   5/13/2025 Steve Skelton III, MD   Next Visit   Visit date not found Steve Skelton III, MD   ED visits in past 90 days: 1        Note composed:11:55 AM 07/31/2025

## 2025-07-31 NOTE — TELEPHONE ENCOUNTER
Care Due:                  Date            Visit Type   Department     Provider  --------------------------------------------------------------------------------                                EP -                              PRIMARY      [facility] INTERNAL  Last Visit: 04-      CARE ([facility])   MEDICINE       Steve Skelton  Next Visit: None Scheduled  None         None Found                                                            Last  Test          Frequency    Reason                     Performed    Due Date  --------------------------------------------------------------------------------    Uric Acid...  12 months..  allopurinoL..............  06- 05-    [facility] Embedded Care Due Messages. Reference number: [id number].   7/31/2025 12:20:46 AM CDT

## 2025-08-15 ENCOUNTER — CLINICAL SUPPORT (OUTPATIENT)
Dept: FAMILY MEDICINE | Facility: CLINIC | Age: 50
End: 2025-08-15
Payer: MEDICAID

## 2025-08-15 DIAGNOSIS — Z23 NEED FOR VACCINATION: Primary | ICD-10-CM

## (undated) DEVICE — KIT GLIDESHEATH SLEND 6FR 10CM

## (undated) DEVICE — SET BASIN 48X48IN 6000ML RING

## (undated) DEVICE — SPONGE DERMACEA GAUZE 4X4

## (undated) DEVICE — SEE MEDLINE ITEM 152622

## (undated) DEVICE — DRAIN CHANNEL ROUND 15FR

## (undated) DEVICE — PAD DEFIB CADENCE ADULT R2

## (undated) DEVICE — TRAY FOLEY 16FR INFECTION CONT

## (undated) DEVICE — ELECTRODE REM PLYHSV RETURN 9

## (undated) DEVICE — BLADE SURG CARBON STEEL #10

## (undated) DEVICE — SKINMARKER & RULER REGULAR X-F

## (undated) DEVICE — TRAY MINOR GEN SURG

## (undated) DEVICE — SUT ETHILON 2-0 BLK MONO PS

## (undated) DEVICE — SEE MEDLINE ITEM 157128

## (undated) DEVICE — NDL SPINAL SPINOCAN 22GX3.5

## (undated) DEVICE — CATH OPTITORQUE TIGER 5F 100CM

## (undated) DEVICE — BAND TR COMP DEVICE REG 24CM

## (undated) DEVICE — SUT SILK 2-0 PS 18IN BLACK

## (undated) DEVICE — BLADE SURG #15 CARBON STEEL

## (undated) DEVICE — SPONGE LAP 18X18 PREWASHED

## (undated) DEVICE — SEE MEDLINE ITEM 146417

## (undated) DEVICE — PAD ABD 8X10 STERILE

## (undated) DEVICE — BANDAGE KERLIX P/P 2.25IN STER

## (undated) DEVICE — Device

## (undated) DEVICE — SYS PRINEO SKIN CLOSURE

## (undated) DEVICE — STAPLER SKIN ROTATING HEAD

## (undated) DEVICE — EVACUATOR WOUND BULB 100CC

## (undated) DEVICE — CATH ANG PIGTAIL 4FR INFINITY

## (undated) DEVICE — SUT 5/0 27IN CHROMIC GUT B

## (undated) DEVICE — DRAPE ANGIO BRACH 38X44IN

## (undated) DEVICE — VISIPAQUE 320 200ML +PK

## (undated) DEVICE — SPIKE SHORT LG BORE 1-WAY 2IN

## (undated) DEVICE — MANIFOLD 4 PORT

## (undated) DEVICE — SEE MEDLINE ITEM 152512

## (undated) DEVICE — SCALPEL #10 BLADE STRL DISP

## (undated) DEVICE — SUT SILK 3-0 BLK PS-2 18IN

## (undated) DEVICE — KIT LEFT HEART MANIFOLD CUSTOM

## (undated) DEVICE — APPLIER CLIP LIAGCLIP 9.375IN

## (undated) DEVICE — BRA SURGICAL XL 40-42

## (undated) DEVICE — GUIDEWIRE EMERALD .035IN 260CM

## (undated) DEVICE — SUT 2/0 27IN PDS II VIO MO

## (undated) DEVICE — PACK UNIVERSAL SPLIT II

## (undated) DEVICE — GOWN AERO CHROME W/ TOWEL XL

## (undated) DEVICE — SYR 30CC LUER LOCK

## (undated) DEVICE — SYS CLSR DERMABOND PRINEO 22CM

## (undated) DEVICE — KIT SURGIFLO HEMOSTATIC MATRIX

## (undated) DEVICE — DRESSING XEROFORM FOIL PK 1X8

## (undated) DEVICE — SUT MCRYL PLUS 4-0 PS2 27IN

## (undated) DEVICE — GOWN SURGICAL X-LARGE

## (undated) DEVICE — SUT MONOCRYL 3-0 PS-2 UND